# Patient Record
Sex: MALE | Race: WHITE | NOT HISPANIC OR LATINO | Employment: OTHER | URBAN - METROPOLITAN AREA
[De-identification: names, ages, dates, MRNs, and addresses within clinical notes are randomized per-mention and may not be internally consistent; named-entity substitution may affect disease eponyms.]

---

## 2017-10-21 ENCOUNTER — HOSPITAL ENCOUNTER (OUTPATIENT)
Facility: MEDICAL CENTER | Age: 57
End: 2017-10-21
Payer: COMMERCIAL

## 2017-10-21 LAB
ALBUMIN SERPL BCP-MCNC: 4 G/DL (ref 3.2–4.9)
ALBUMIN/GLOB SERPL: 1.4 G/DL
ALP SERPL-CCNC: 57 U/L (ref 30–99)
ALT SERPL-CCNC: 14 U/L (ref 2–50)
ANION GAP SERPL CALC-SCNC: 7 MMOL/L (ref 0–11.9)
AST SERPL-CCNC: 19 U/L (ref 12–45)
BDY FAT % MEASURED: 16.4 %
BILIRUB SERPL-MCNC: 0.7 MG/DL (ref 0.1–1.5)
BP DIAS: 84 MMHG
BP SYS: 136 MMHG
BUN SERPL-MCNC: 13 MG/DL (ref 8–22)
CALCIUM SERPL-MCNC: 9.4 MG/DL (ref 8.5–10.5)
CHLORIDE SERPL-SCNC: 104 MMOL/L (ref 96–112)
CHOLEST SERPL-MCNC: 179 MG/DL (ref 100–199)
CO2 SERPL-SCNC: 27 MMOL/L (ref 20–33)
CREAT SERPL-MCNC: 1.06 MG/DL (ref 0.5–1.4)
DIABETES HTDIA: NO
EVENT NAME HTEVT: NORMAL
GFR SERPL CREATININE-BSD FRML MDRD: >60 ML/MIN/1.73 M 2
GLOBULIN SER CALC-MCNC: 2.8 G/DL (ref 1.9–3.5)
GLUCOSE SERPL-MCNC: 88 MG/DL (ref 65–99)
HDLC SERPL-MCNC: 80 MG/DL
HYPERTENSION HTHYP: NO
LDLC SERPL CALC-MCNC: 87 MG/DL
POTASSIUM SERPL-SCNC: 4.5 MMOL/L (ref 3.6–5.5)
PROT SERPL-MCNC: 6.8 G/DL (ref 6–8.2)
SCREENING LOC CITY HTCIT: NORMAL
SCREENING LOC STATE HTSTA: NORMAL
SCREENING LOCATION HTLOC: NORMAL
SODIUM SERPL-SCNC: 138 MMOL/L (ref 135–145)
SUBSCRIBER ID HTSID: NORMAL
TRIGL SERPL-MCNC: 60 MG/DL (ref 0–149)

## 2018-05-30 ENCOUNTER — APPOINTMENT (OUTPATIENT)
Dept: RADIOLOGY | Facility: MEDICAL CENTER | Age: 58
End: 2018-05-30
Attending: EMERGENCY MEDICINE
Payer: COMMERCIAL

## 2018-05-30 ENCOUNTER — HOSPITAL ENCOUNTER (OUTPATIENT)
Facility: MEDICAL CENTER | Age: 58
End: 2018-06-01
Attending: EMERGENCY MEDICINE | Admitting: INTERNAL MEDICINE
Payer: COMMERCIAL

## 2018-05-30 ENCOUNTER — OFFICE VISIT (OUTPATIENT)
Dept: URGENT CARE | Facility: CLINIC | Age: 58
End: 2018-05-30
Payer: COMMERCIAL

## 2018-05-30 VITALS
SYSTOLIC BLOOD PRESSURE: 140 MMHG | WEIGHT: 204 LBS | OXYGEN SATURATION: 98 % | TEMPERATURE: 97.2 F | BODY MASS INDEX: 26.18 KG/M2 | HEART RATE: 86 BPM | RESPIRATION RATE: 16 BRPM | HEIGHT: 74 IN | DIASTOLIC BLOOD PRESSURE: 110 MMHG

## 2018-05-30 DIAGNOSIS — I48.92 ATRIAL FLUTTER, UNSPECIFIED TYPE (HCC): ICD-10-CM

## 2018-05-30 DIAGNOSIS — R42 DIZZINESS: ICD-10-CM

## 2018-05-30 DIAGNOSIS — R94.31 ABNORMAL ECG: ICD-10-CM

## 2018-05-30 DIAGNOSIS — I48.92 ATRIAL FLUTTER, UNSPECIFIED TYPE (HCC): Primary | ICD-10-CM

## 2018-05-30 DIAGNOSIS — I49.9 IRREGULAR HEART RATE: ICD-10-CM

## 2018-05-30 DIAGNOSIS — R03.0 ELEVATED BLOOD PRESSURE READING: ICD-10-CM

## 2018-05-30 PROBLEM — Z78.9 ALCOHOL USE: Status: ACTIVE | Noted: 2018-05-30

## 2018-05-30 PROBLEM — F10.90 ALCOHOL USE: Status: ACTIVE | Noted: 2018-05-30

## 2018-05-30 LAB
ALBUMIN SERPL BCP-MCNC: 4.1 G/DL (ref 3.2–4.9)
ALBUMIN/GLOB SERPL: 1.5 G/DL
ALP SERPL-CCNC: 50 U/L (ref 30–99)
ALT SERPL-CCNC: 25 U/L (ref 2–50)
ANION GAP SERPL CALC-SCNC: 12 MMOL/L (ref 0–11.9)
APTT PPP: 25 SEC (ref 24.7–36)
AST SERPL-CCNC: 26 U/L (ref 12–45)
BASOPHILS # BLD AUTO: 0.3 % (ref 0–1.8)
BASOPHILS # BLD: 0.02 K/UL (ref 0–0.12)
BILIRUB SERPL-MCNC: 1.6 MG/DL (ref 0.1–1.5)
BNP SERPL-MCNC: 52 PG/ML (ref 0–100)
BUN SERPL-MCNC: 13 MG/DL (ref 8–22)
CALCIUM SERPL-MCNC: 9.9 MG/DL (ref 8.5–10.5)
CHLORIDE SERPL-SCNC: 103 MMOL/L (ref 96–112)
CO2 SERPL-SCNC: 24 MMOL/L (ref 20–33)
CREAT SERPL-MCNC: 1 MG/DL (ref 0.5–1.4)
EKG IMPRESSION: NORMAL
EOSINOPHIL # BLD AUTO: 0.07 K/UL (ref 0–0.51)
EOSINOPHIL NFR BLD: 1.2 % (ref 0–6.9)
ERYTHROCYTE [DISTWIDTH] IN BLOOD BY AUTOMATED COUNT: 41.5 FL (ref 35.9–50)
GLOBULIN SER CALC-MCNC: 2.7 G/DL (ref 1.9–3.5)
GLUCOSE SERPL-MCNC: 105 MG/DL (ref 65–99)
HCT VFR BLD AUTO: 44.9 % (ref 42–52)
HGB BLD-MCNC: 15.6 G/DL (ref 14–18)
IMM GRANULOCYTES # BLD AUTO: 0.02 K/UL (ref 0–0.11)
IMM GRANULOCYTES NFR BLD AUTO: 0.3 % (ref 0–0.9)
INR PPP: 1.08 (ref 0.87–1.13)
LYMPHOCYTES # BLD AUTO: 1.55 K/UL (ref 1–4.8)
LYMPHOCYTES NFR BLD: 25.5 % (ref 22–41)
MAGNESIUM SERPL-MCNC: 1.9 MG/DL (ref 1.5–2.5)
MCH RBC QN AUTO: 31.8 PG (ref 27–33)
MCHC RBC AUTO-ENTMCNC: 34.7 G/DL (ref 33.7–35.3)
MCV RBC AUTO: 91.4 FL (ref 81.4–97.8)
MONOCYTES # BLD AUTO: 0.41 K/UL (ref 0–0.85)
MONOCYTES NFR BLD AUTO: 6.7 % (ref 0–13.4)
NEUTROPHILS # BLD AUTO: 4.01 K/UL (ref 1.82–7.42)
NEUTROPHILS NFR BLD: 66 % (ref 44–72)
NRBC # BLD AUTO: 0 K/UL
NRBC BLD-RTO: 0 /100 WBC
PLATELET # BLD AUTO: 172 K/UL (ref 164–446)
PMV BLD AUTO: 10.3 FL (ref 9–12.9)
POTASSIUM SERPL-SCNC: 3.9 MMOL/L (ref 3.6–5.5)
PROT SERPL-MCNC: 6.8 G/DL (ref 6–8.2)
PROTHROMBIN TIME: 13.7 SEC (ref 12–14.6)
RBC # BLD AUTO: 4.91 M/UL (ref 4.7–6.1)
SODIUM SERPL-SCNC: 139 MMOL/L (ref 135–145)
TROPONIN I SERPL-MCNC: 0.01 NG/ML (ref 0–0.04)
TROPONIN I SERPL-MCNC: 0.01 NG/ML (ref 0–0.04)
TSH SERPL DL<=0.005 MIU/L-ACNC: 1.59 UIU/ML (ref 0.38–5.33)
VIT B12 SERPL-MCNC: 392 PG/ML (ref 211–911)
WBC # BLD AUTO: 6.1 K/UL (ref 4.8–10.8)

## 2018-05-30 PROCEDURE — 85025 COMPLETE CBC W/AUTO DIFF WBC: CPT

## 2018-05-30 PROCEDURE — 36415 COLL VENOUS BLD VENIPUNCTURE: CPT

## 2018-05-30 PROCEDURE — 700102 HCHG RX REV CODE 250 W/ 637 OVERRIDE(OP): Performed by: INTERNAL MEDICINE

## 2018-05-30 PROCEDURE — 99220 PR INITIAL OBSERVATION CARE,LEVL III: CPT | Performed by: INTERNAL MEDICINE

## 2018-05-30 PROCEDURE — 93000 ELECTROCARDIOGRAM COMPLETE: CPT | Performed by: NURSE PRACTITIONER

## 2018-05-30 PROCEDURE — 85730 THROMBOPLASTIN TIME PARTIAL: CPT

## 2018-05-30 PROCEDURE — 84443 ASSAY THYROID STIM HORMONE: CPT

## 2018-05-30 PROCEDURE — 93005 ELECTROCARDIOGRAM TRACING: CPT | Performed by: EMERGENCY MEDICINE

## 2018-05-30 PROCEDURE — A9270 NON-COVERED ITEM OR SERVICE: HCPCS | Performed by: STUDENT IN AN ORGANIZED HEALTH CARE EDUCATION/TRAINING PROGRAM

## 2018-05-30 PROCEDURE — 700102 HCHG RX REV CODE 250 W/ 637 OVERRIDE(OP): Performed by: EMERGENCY MEDICINE

## 2018-05-30 PROCEDURE — 93005 ELECTROCARDIOGRAM TRACING: CPT

## 2018-05-30 PROCEDURE — 99285 EMERGENCY DEPT VISIT HI MDM: CPT

## 2018-05-30 PROCEDURE — 96365 THER/PROPH/DIAG IV INF INIT: CPT

## 2018-05-30 PROCEDURE — G0378 HOSPITAL OBSERVATION PER HR: HCPCS

## 2018-05-30 PROCEDURE — 82607 VITAMIN B-12: CPT

## 2018-05-30 PROCEDURE — 700105 HCHG RX REV CODE 258: Performed by: HOSPITALIST

## 2018-05-30 PROCEDURE — 80053 COMPREHEN METABOLIC PANEL: CPT

## 2018-05-30 PROCEDURE — 83735 ASSAY OF MAGNESIUM: CPT

## 2018-05-30 PROCEDURE — 85610 PROTHROMBIN TIME: CPT

## 2018-05-30 PROCEDURE — 83880 ASSAY OF NATRIURETIC PEPTIDE: CPT

## 2018-05-30 PROCEDURE — A9270 NON-COVERED ITEM OR SERVICE: HCPCS | Performed by: HOSPITALIST

## 2018-05-30 PROCEDURE — A9270 NON-COVERED ITEM OR SERVICE: HCPCS | Performed by: INTERNAL MEDICINE

## 2018-05-30 PROCEDURE — A9270 NON-COVERED ITEM OR SERVICE: HCPCS | Performed by: EMERGENCY MEDICINE

## 2018-05-30 PROCEDURE — 71045 X-RAY EXAM CHEST 1 VIEW: CPT

## 2018-05-30 PROCEDURE — 99204 OFFICE O/P NEW MOD 45 MIN: CPT | Performed by: NURSE PRACTITIONER

## 2018-05-30 PROCEDURE — 700102 HCHG RX REV CODE 250 W/ 637 OVERRIDE(OP): Performed by: HOSPITALIST

## 2018-05-30 PROCEDURE — 700102 HCHG RX REV CODE 250 W/ 637 OVERRIDE(OP): Performed by: STUDENT IN AN ORGANIZED HEALTH CARE EDUCATION/TRAINING PROGRAM

## 2018-05-30 PROCEDURE — 700111 HCHG RX REV CODE 636 W/ 250 OVERRIDE (IP): Performed by: STUDENT IN AN ORGANIZED HEALTH CARE EDUCATION/TRAINING PROGRAM

## 2018-05-30 PROCEDURE — 84484 ASSAY OF TROPONIN QUANT: CPT | Mod: 91

## 2018-05-30 RX ORDER — BISACODYL 10 MG
10 SUPPOSITORY, RECTAL RECTAL
Status: DISCONTINUED | OUTPATIENT
Start: 2018-05-30 | End: 2018-06-01 | Stop reason: HOSPADM

## 2018-05-30 RX ORDER — SODIUM CHLORIDE 9 MG/ML
INJECTION, SOLUTION INTRAVENOUS CONTINUOUS
Status: DISCONTINUED | OUTPATIENT
Start: 2018-05-30 | End: 2018-05-31

## 2018-05-30 RX ORDER — LISINOPRIL 5 MG/1
5 TABLET ORAL
Status: DISCONTINUED | OUTPATIENT
Start: 2018-05-30 | End: 2018-06-01 | Stop reason: HOSPADM

## 2018-05-30 RX ORDER — THIAMINE MONONITRATE (VIT B1) 100 MG
100 TABLET ORAL DAILY
Status: DISCONTINUED | OUTPATIENT
Start: 2018-05-30 | End: 2018-06-01 | Stop reason: HOSPADM

## 2018-05-30 RX ORDER — MECLIZINE HYDROCHLORIDE 25 MG/1
25 TABLET ORAL 3 TIMES DAILY PRN
Status: DISCONTINUED | OUTPATIENT
Start: 2018-05-30 | End: 2018-06-01 | Stop reason: HOSPADM

## 2018-05-30 RX ORDER — POLYETHYLENE GLYCOL 3350 17 G/17G
1 POWDER, FOR SOLUTION ORAL
Status: DISCONTINUED | OUTPATIENT
Start: 2018-05-30 | End: 2018-06-01 | Stop reason: HOSPADM

## 2018-05-30 RX ORDER — MAGNESIUM SULFATE 1 G/100ML
1 INJECTION INTRAVENOUS ONCE
Status: COMPLETED | OUTPATIENT
Start: 2018-05-30 | End: 2018-05-30

## 2018-05-30 RX ORDER — MECLIZINE HYDROCHLORIDE 25 MG/1
25 TABLET ORAL ONCE
Status: COMPLETED | OUTPATIENT
Start: 2018-05-30 | End: 2018-05-30

## 2018-05-30 RX ORDER — AMOXICILLIN 250 MG
2 CAPSULE ORAL 2 TIMES DAILY
Status: DISCONTINUED | OUTPATIENT
Start: 2018-05-30 | End: 2018-06-01 | Stop reason: HOSPADM

## 2018-05-30 RX ORDER — ONDANSETRON 2 MG/ML
4 INJECTION INTRAMUSCULAR; INTRAVENOUS EVERY 4 HOURS PRN
Status: DISCONTINUED | OUTPATIENT
Start: 2018-05-30 | End: 2018-06-01 | Stop reason: HOSPADM

## 2018-05-30 RX ORDER — ASPIRIN 81 MG/1
324 TABLET, CHEWABLE ORAL ONCE
Status: COMPLETED | OUTPATIENT
Start: 2018-05-30 | End: 2018-05-30

## 2018-05-30 RX ORDER — ASPIRIN 300 MG/1
300 SUPPOSITORY RECTAL ONCE
Status: COMPLETED | OUTPATIENT
Start: 2018-05-30 | End: 2018-05-30

## 2018-05-30 RX ORDER — PROMETHAZINE HYDROCHLORIDE 25 MG/1
12.5-25 TABLET ORAL EVERY 4 HOURS PRN
Status: DISCONTINUED | OUTPATIENT
Start: 2018-05-30 | End: 2018-06-01 | Stop reason: HOSPADM

## 2018-05-30 RX ORDER — PROMETHAZINE HYDROCHLORIDE 25 MG/1
12.5-25 SUPPOSITORY RECTAL EVERY 4 HOURS PRN
Status: DISCONTINUED | OUTPATIENT
Start: 2018-05-30 | End: 2018-06-01 | Stop reason: HOSPADM

## 2018-05-30 RX ORDER — ONDANSETRON 4 MG/1
4 TABLET, ORALLY DISINTEGRATING ORAL EVERY 4 HOURS PRN
Status: DISCONTINUED | OUTPATIENT
Start: 2018-05-30 | End: 2018-06-01 | Stop reason: HOSPADM

## 2018-05-30 RX ORDER — ALPHA LIPOIC ACID 200 MG
1 CAPSULE ORAL DAILY
COMMUNITY
End: 2018-07-16

## 2018-05-30 RX ADMIN — THERA TABS 1 TABLET: TAB at 16:34

## 2018-05-30 RX ADMIN — MECLIZINE HYDROCHLORIDE 25 MG: 25 TABLET ORAL at 16:34

## 2018-05-30 RX ADMIN — Medication 100 MG: at 16:35

## 2018-05-30 RX ADMIN — SODIUM CHLORIDE: 9 INJECTION, SOLUTION INTRAVENOUS at 16:36

## 2018-05-30 RX ADMIN — ASPIRIN 324 MG: 81 TABLET, CHEWABLE ORAL at 12:25

## 2018-05-30 RX ADMIN — SODIUM CHLORIDE: 9 INJECTION, SOLUTION INTRAVENOUS at 20:34

## 2018-05-30 RX ADMIN — LISINOPRIL 5 MG: 5 TABLET ORAL at 20:32

## 2018-05-30 RX ADMIN — MAGNESIUM SULFATE IN DEXTROSE 1 G: 10 INJECTION, SOLUTION INTRAVENOUS at 16:34

## 2018-05-30 RX ADMIN — APIXABAN 5 MG: 5 TABLET, FILM COATED ORAL at 18:33

## 2018-05-30 ASSESSMENT — PAIN SCALES - GENERAL: PAINLEVEL_OUTOF10: 0

## 2018-05-30 ASSESSMENT — COPD QUESTIONNAIRES
DURING THE PAST 4 WEEKS HOW MUCH DID YOU FEEL SHORT OF BREATH: NONE/LITTLE OF THE TIME
IN THE PAST 12 MONTHS DO YOU DO LESS THAN YOU USED TO BECAUSE OF YOUR BREATHING PROBLEMS: DISAGREE/UNSURE
COPD SCREENING SCORE: 1
HAVE YOU SMOKED AT LEAST 100 CIGARETTES IN YOUR ENTIRE LIFE: NO/DON'T KNOW
COPD SCREENING SCORE: 1
DURING THE PAST 4 WEEKS HOW MUCH DID YOU FEEL SHORT OF BREATH: NONE/LITTLE OF THE TIME
HAVE YOU SMOKED AT LEAST 100 CIGARETTES IN YOUR ENTIRE LIFE: NO/DON'T KNOW
DO YOU EVER COUGH UP ANY MUCUS OR PHLEGM?: NO/ONLY WITH OCCASIONAL COLDS OR INFECTIONS
DO YOU EVER COUGH UP ANY MUCUS OR PHLEGM?: NO/ONLY WITH OCCASIONAL COLDS OR INFECTIONS

## 2018-05-30 ASSESSMENT — ENCOUNTER SYMPTOMS
NECK PAIN: 0
TINGLING: 0
CHILLS: 0
BACK PAIN: 0
DIAPHORESIS: 0
NERVOUS/ANXIOUS: 0
SPEECH CHANGE: 0
CONSTIPATION: 0
BLOOD IN STOOL: 0
ABDOMINAL PAIN: 0
DEPRESSION: 0
DIZZINESS: 1
NAUSEA: 1
DIARRHEA: 0
FEVER: 0
HEADACHES: 0
VOMITING: 1
COUGH: 0
FOCAL WEAKNESS: 0
SENSORY CHANGE: 0
PALPITATIONS: 1
HALLUCINATIONS: 0
SHORTNESS OF BREATH: 0
SORE THROAT: 0
SPUTUM PRODUCTION: 0
DOUBLE VISION: 0
BLURRED VISION: 0
MYALGIAS: 0

## 2018-05-30 ASSESSMENT — LIFESTYLE VARIABLES
EVER HAD A DRINK FIRST THING IN THE MORNING TO STEADY YOUR NERVES TO GET RID OF A HANGOVER: NO
TOTAL SCORE: 1
CONSUMPTION TOTAL: POSITIVE
EVER FELT BAD OR GUILTY ABOUT YOUR DRINKING: NO
ON A TYPICAL DAY WHEN YOU DRINK ALCOHOL HOW MANY DRINKS DO YOU HAVE: 3
TOTAL SCORE: 1
EVER_SMOKED: NEVER
ALCOHOL_USE: YES
TOTAL SCORE: 1
HAVE PEOPLE ANNOYED YOU BY CRITICIZING YOUR DRINKING: NO
HAVE YOU EVER FELT YOU SHOULD CUT DOWN ON YOUR DRINKING: YES
AVERAGE NUMBER OF DAYS PER WEEK YOU HAVE A DRINK CONTAINING ALCOHOL: 6
EVER_SMOKED: NEVER
HOW MANY TIMES IN THE PAST YEAR HAVE YOU HAD 5 OR MORE DRINKS IN A DAY: 100

## 2018-05-30 ASSESSMENT — PATIENT HEALTH QUESTIONNAIRE - PHQ9
SUM OF ALL RESPONSES TO PHQ9 QUESTIONS 1 AND 2: 0
1. LITTLE INTEREST OR PLEASURE IN DOING THINGS: NOT AT ALL
2. FEELING DOWN, DEPRESSED, IRRITABLE, OR HOPELESS: NOT AT ALL

## 2018-05-30 NOTE — NON-PROVIDER
"      Internal Medicine Medical Student Admitting History and Physical  Note Author: Caesar Marquez, Student    Name Edgar Jackson     1960   Age/Sex 57 y.o. male   MRN [id number]   Code Status full     After 5PM or if no immediate response to page, please call for cross-coverage  Attending/Team: Dr. Marcus/Lang See Patient List for primary contact information  Call [phone] to page after hours   1st Call - Day Intern (R1):   Dr. Mccabe 2nd Call - Day Sr. Resident (R2/R3):   Dr. Dumont       Chief Complaint:  dizziness, nausea, atrial flutter    HPI: Patient is a 56 yo male with a history of alcohol use who presents with 3 day history of positional dizziness and nausea. Symptoms began Monday while he was working in the yard. He started to feel like everything was spinning. He stayed home from work Tuesday hoping symptoms would resolve and then went to urgent care Wednesday when they did not. Urgent care recommended he come to the ED. Symptoms worsen with head movements and are associated with nausea and vomiting x1. Symptoms improve when patient does not move his head.    Patient has been experiencing \"heart racing\" for the past 6 months. Symptoms have no apparent pattern to when they begin and what will relieve them. He has never experienced anything like this before. He wears a heart monitor while working out and has noticed his heart rate can reach up to 220 at times where it previously remained below 160. On presentation to the ED, patient was noted to be in atrial flutter on EKG.    ED: EKG, CXR, CBC, CMP, troponin 0.01, BNP 52, B12 392, TSH 1.59    Review of Systems   Constitutional: Negative for chills and fever.   HENT: Negative for hearing loss and tinnitus.    Eyes: Negative for blurred vision and double vision.   Respiratory: Negative for shortness of breath.    Cardiovascular: Positive for palpitations. Negative for chest pain.   Gastrointestinal: Positive for nausea and vomiting. Negative " "for abdominal pain, blood in stool and melena.   Genitourinary: Negative for dysuria.   Skin: Negative for rash.   Neurological: Positive for dizziness. Negative for tingling, sensory change, focal weakness and headaches.             Past Medical History  And current medications (link outpatient medications  with diagnosis)    none    Past Surgical History:  No past surgical history on file.      Medication Allergy/Sensitivities:  Allergies   Allergen Reactions   • Nkda [No Known Drug Allergy]          Family History:  Dad - HTN, coronary stent  Grandparents - pacemaker for \"slow heart rate\"    Social History:  Smoking: denied  Alcohol: 4 drinks per day (beer, dakota, vodka)  Illictis: denied  Other: works at [employer]          Physical Exam     Vitals:    05/30/18 1400 05/30/18 1413 05/30/18 1500 05/30/18 1600   BP:       Pulse: 82 81 83 62   Resp: 16 12 (!) 21 (!) 11   Temp:       SpO2: 98% 98% 99% 95%   Weight:       Height:         Body mass index is 26.32 kg/m².  BP (!) 169/113   Pulse 62   Temp 36.9 °C (98.5 °F)   Resp (!) 11   Ht 1.88 m (6' 2\")   Wt 93 kg (205 lb)   SpO2 95%   BMI 26.32 kg/m²   O2 therapy: Pulse Oximetry: 95 %, O2 (LPM): 0, FiO2%: 21 %    Physical Exam   Constitutional: He is oriented to person, place, and time and well-developed, well-nourished, and in no distress.   HENT:   Head: Normocephalic and atraumatic.   Eyes: EOM are normal. Pupils are equal, round, and reactive to light. Right eye exhibits no nystagmus. Left eye exhibits no nystagmus.   Cardiovascular: Normal rate.  An irregularly irregular rhythm present. Exam reveals no gallop.    No murmur heard.  Atrial flutter   Pulmonary/Chest: Breath sounds normal. He has no wheezes. He has no rales.   Abdominal: Soft. He exhibits no distension.   Musculoskeletal: He exhibits no edema.   Neurological: He is alert and oriented to person, place, and time.   Skin: Skin is warm and dry. No rash noted. He is not " diaphoretic.           Assessment/Plan     #dizziness  -Likely benign paroxysmal positional vertigo. Head movements exacerbate symptoms and occur while lying down. Dizziness unlikely related to atrial flutter as the flutter appears to be a chronic issue and dizziness has had more acute onset. Patient does not present with symptoms of presyncope or disequilibrium.    Plan:  -one dose meclizine 25mg to see if it improves symptoms    #atrial flutter  -Likely chronic issue. Patient has been experiencing palpitations for 6 months as well as tachycardia to 220s during exercise. Alcohol is most likely the cause of his atrial flutter given that patient drinks 4 alcoholic beverages per day. Cardiology is following and is also considering thyroid vs structural abnormality vs pulmonary. CHADS-VASc score 0.    Plan:  -echo to assess heart structure and function  -thyroid panel to assess for thyroid abnormality  -alcohol cessation counseling, last drink Monday evening  -NPO at midnight for VIBHA flutter ablation tomorrow    #alcohol use  -Patient reports drinking 4 alcoholic beverages per day. Has never experienced withdrawal. Last drink was Monday evening.    Plan:  -alcohol cessation counseling as above

## 2018-05-30 NOTE — ED TRIAGE NOTES
Pt BIB EMS from   Chief Complaint   Patient presents with   • Atrial Flutter     New onset, started feeling bad over the weekend and checked into UC with nausea this morning   Pt denies any CP but admits to dizziness on standing.  Chart up for ERP

## 2018-05-30 NOTE — PROGRESS NOTES
"Subjective:      Edgar Jackson is a 57 y.o. male who presents with Dizziness (nausea, x3days and has increased, vomiting due to dizziness, was cleaning yard and soon after felt, heart will start to race)      Denies past medical, surgical or family history that is significant to today's problem.   RX or OTC medications reviewed with patient today.   Allergies   Allergen Reactions   • Nkda [No Known Drug Allergy]              HPI 58 y/o male with c/o dizziness, nausea x 3 days. He feels like the room is spinning around him. Vomited several times over the past few days. Noticed that his heart was racing when he was working in his yard over the weekend. Denies numbness, tingling, abd pain, tinnitus, chest pain, cardiac palpitations, SOB, blurred vision or headache. Treatments tried: keeping well hydrated, increased rest. No other aggravating or alleviating factors.    His family member drove him to  today - he did not feel he was safe to drive. Dizziness increases with movement.       ROS  See hPI      Objective:     /110   Pulse 86   Temp 36.2 °C (97.2 °F)   Resp 16   Ht 1.88 m (6' 2\")   Wt 92.5 kg (204 lb)   SpO2 98%   BMI 26.19 kg/m²      Physical Exam   Constitutional: He is oriented to person, place, and time. Vital signs are normal. He appears well-developed and well-nourished. He is cooperative.   HENT:   Head: Normocephalic.   Eyes: Pupils are equal, round, and reactive to light.   Neck: Trachea normal, normal range of motion and full passive range of motion without pain. Neck supple.   Cardiovascular: Normal rate, normal heart sounds and normal pulses.  An irregular rhythm present. PMI is not displaced.    No murmur heard.  Pulmonary/Chest: Effort normal and breath sounds normal.   Neurological: He is alert and oriented to person, place, and time.   Skin: Skin is warm. Capillary refill takes less than 2 seconds.   Psychiatric: He has a normal mood and affect. His speech is normal and " behavior is normal. Thought content normal. Cognition and memory are normal.   Appears a little apprehensive   Nursing note and vitals reviewed.         EKG Interpretation    Interpreted by me    Rhythm: atrial flutter  Rate: 100-110  Axis: normal  Ectopy: none  Conduction: normal  ST Segments: nonspecific changes  T Waves: non specific changes  Q Waves: none    Clinical Impression: atrial flutter (new onset)         Assessment/Plan:     1. Atrial flutter, unspecified type (HCC)      4:1 block     2. Irregular heart rate  EKG - Clinic Performed   3. Dizziness  EKG - Clinic Performed   4. Abnormal ECG       [facility] called to arrange transfer to higher level of care. Report given to ER MD. Pt is to be transported via [facility]    I have reiterated to patient that although a provider to provider transfer was made this will not necessarily expedite the ER process

## 2018-05-30 NOTE — H&P
"      Internal Medicine Admitting History and Physical    Note Author: Steve Mccabe M.D.       Name Edgar Jackson 1960   Age/Sex 57 y.o. male   MRN [id number]   Code Status Full code     After 5PM or if no immediate response to page, please call for cross-coverage  Attending/Team: Dr. Marcus/ Lang See Patient List for primary contact information  Call [phone] to page    1st Call - Day Intern (R1):   Dr. Mccabe 2nd Call - Day Sr. Resident (R2/R3):   Dr. Dumont       Chief Complaint:  Dizziness, nausea  Racing heart    HPI:  Patient is a 57 year old male with no significant past medical history who presents with complaints of dizziness and nausea since Monday. Patient reported that he was getting out of his car on Monday when he first developed the dizziness. He was able to go on with his day without any trouble. However on Tuesday, it was persistent and he called in sick to work. The symptoms have been progressively worsening since then which made him go to urgent care this morning where he was found to be in atrial flutter and sent to the ED for further evaluation.  Patient describes the dizziness as a sensation of room spinning. Moving his head brings on the dizziness. It is associated with nausea and vomiting. No tinnitus/ sensation of ear fullness/ changes in hearing/ recent URI. No lightheadedness or episodes of syncope. Patient reports that for the last 6 months, he has been experiencing racing of his heart. They usually occur on exertion and resolve when he \"slows down\". Most recently , he experienced the palpitations when he was doing yard work last Saturday/. Denies chest pain, shortness of breath, lightheadedness/ passing out during the episodes. He admitted to drinking about 4-5 drinks everyday. His last drink was on Monday night. No history of withdrawal    On arrival to the ED here, he had a heart rate of 65, /113, RR 18 and saO2 99% on room air. EKG showed  atrial flutter " with atrial rate of 263, ventricular rate was 69. TSH and electrolytes were normal.  Cardiology was consulted who will consult EP service for possible ablation.       Review of Systems   Constitutional: Negative for chills, diaphoresis and fever.   HENT: Negative for congestion, ear discharge, ear pain, hearing loss, sore throat and tinnitus.    Eyes: Negative for blurred vision and double vision.   Respiratory: Negative for cough, sputum production and shortness of breath.    Cardiovascular: Positive for palpitations. Negative for chest pain and leg swelling.   Gastrointestinal: Positive for nausea and vomiting. Negative for abdominal pain, constipation and diarrhea.   Genitourinary: Negative for dysuria, frequency and urgency.   Musculoskeletal: Negative for back pain, myalgias and neck pain.   Skin: Negative for rash.   Neurological: Positive for dizziness. Negative for sensory change, speech change, focal weakness and headaches.   Psychiatric/Behavioral: Negative for depression and hallucinations. The patient is not nervous/anxious.              Past Medical History:   No past medical history per patient    Past Surgical History:  None      Current Outpatient Medications:  Home Medications     Reviewed by Tab Covarrubias (Pharmacy Tech) on 05/30/18 at 1541  Med List Status: Complete   Medication Last Dose Status   B Complex Vitamins (B COMPLEX-B12) Tab >2 days Active   Coenzyme Q10 (CO Q 10 PO) >2 days Active                Medication Allergy/Sensitivities:  Allergies   Allergen Reactions   • Nkda [No Known Drug Allergy]          Family History:  Family History   Problem Relation Age of Onset   • Heart Disease Father        Social History:  Social History     Social History   • Marital status:      Spouse name: N/A   • Number of children: N/A   • Years of education: N/A     Occupational History   • Not on file.     Social History Main Topics   • Smoking status: Never Smoker   • Smokeless tobacco:  "Never Used   • Alcohol use Yes      Comment: 4-5 drinks everyday   • Drug use: No   • Sexual activity: Yes     Partners: Female     Other Topics Concern   • Not on file     Social History Narrative   • No narrative on file     Living situation: Lives in [place]  PCP : Thomas Shepherd D.O.        Physical Exam     Vitals:    05/30/18 1330 05/30/18 1346 05/30/18 1400 05/30/18 1413   BP:       Pulse: 69 69 82 81   Resp: (!) 9 14 16 12   Temp:       SpO2: 97% 98% 98% 98%   Weight:       Height:         Body mass index is 26.32 kg/m².  BP (!) 169/113   Pulse 81   Temp 36.9 °C (98.5 °F)   Resp 12   Ht 1.88 m (6' 2\")   Wt 93 kg (205 lb)   SpO2 98%   BMI 26.32 kg/m²   O2 therapy: Pulse Oximetry: 98 %, O2 (LPM): 0, FiO2%: 21 %    Physical Exam   Constitutional: He is oriented to person, place, and time and well-developed, well-nourished, and in no distress.   HENT:   Head: Normocephalic and atraumatic.   Mouth/Throat: No oropharyngeal exudate.   Head movement brings on the dizziness   Eyes: EOM are normal. Pupils are equal, round, and reactive to light.   No nystagmus   Neck: Normal range of motion. Neck supple. No tracheal deviation present.   Cardiovascular: Normal rate and normal heart sounds.    No murmur heard.  Irregular rhythm   Pulmonary/Chest: Effort normal and breath sounds normal. No respiratory distress. He has no wheezes.   Abdominal: Soft. Bowel sounds are normal. He exhibits no distension. There is no tenderness.   Musculoskeletal: He exhibits no edema.   Neurological: He is alert and oriented to person, place, and time. No cranial nerve deficit. Coordination normal.   Skin: No rash noted. No erythema.   Psychiatric: Memory, affect and judgment normal.             Data Review       Old Records Request:   Completed  Current Records review/summary: Completed    Lab Data Review:  Recent Results (from the past 24 hour(s))   EKG (NOW)    Collection Time: 05/30/18 11:51 AM   Result Value Ref Range    Report  "      [facility]    Test Date:  2018  Pt Name:    EDWIN QUICK            Department: ER  MRN:        [id number]                      Room:       RD 01  Gender:     Male                         Technician: [id number]  :        1960                   Requested By:ER TRIAGE PROTOCOL  Order #:    [id number]                    Reading MD:    Measurements  Intervals                                Axis  Rate:       69                           P:  FL:                                      QRS:        35  QRSD:       114                          T:          -18  QT:         464  QTc:        497    Interpretive Statements  ATRIAL FLUTTER, A-RATE 263  INCOMPLETE RIGHT BUNDLE BRANCH BLOCK  ARTIFACT IN LEAD(S) III,aVL,aVF  No previous ECG available for comparison     CBC w/ Differential    Collection Time: 18 11:56 AM   Result Value Ref Range    WBC 6.1 4.8 - 10.8 K/uL    RBC 4.91 4.70 - 6.10 M/uL    Hemoglobin 15.6 14.0 - 18.0 g/dL    Hematocrit 44.9 42.0 - 52.0 %    MCV 91.4 81.4 - 97.8 fL    MCH 31.8 27.0 - 33.0 pg    MCHC 34.7 33.7 - 35.3 g/dL    RDW 41.5 35.9 - 50.0 fL    Platelet Count 172 164 - 446 K/uL    MPV 10.3 9.0 - 12.9 fL    Neutrophils-Polys 66.00 44.00 - 72.00 %    Lymphocytes 25.50 22.00 - 41.00 %    Monocytes 6.70 0.00 - 13.40 %    Eosinophils 1.20 0.00 - 6.90 %    Basophils 0.30 0.00 - 1.80 %    Immature Granulocytes 0.30 0.00 - 0.90 %    Nucleated RBC 0.00 /100 WBC    Neutrophils (Absolute) 4.01 1.82 - 7.42 K/uL    Lymphs (Absolute) 1.55 1.00 - 4.80 K/uL    Monos (Absolute) 0.41 0.00 - 0.85 K/uL    Eos (Absolute) 0.07 0.00 - 0.51 K/uL    Baso (Absolute) 0.02 0.00 - 0.12 K/uL    Immature Granulocytes (abs) 0.02 0.00 - 0.11 K/uL    NRBC (Absolute) 0.00 K/uL   Complete Metabolic Panel (CMP)    Collection Time: 18 11:56 AM   Result Value Ref Range    Sodium 139 135 - 145 mmol/L    Potassium 3.9 3.6 - 5.5 mmol/L    Chloride 103 96 - 112 mmol/L    Co2 24 20  - 33 mmol/L    Anion Gap 12.0 (H) 0.0 - 11.9    Glucose 105 (H) 65 - 99 mg/dL    Bun 13 8 - 22 mg/dL    Creatinine 1.00 0.50 - 1.40 mg/dL    Calcium 9.9 8.5 - 10.5 mg/dL    AST(SGOT) 26 12 - 45 U/L    ALT(SGPT) 25 2 - 50 U/L    Alkaline Phosphatase 50 30 - 99 U/L    Total Bilirubin 1.6 (H) 0.1 - 1.5 mg/dL    Albumin 4.1 3.2 - 4.9 g/dL    Total Protein 6.8 6.0 - 8.2 g/dL    Globulin 2.7 1.9 - 3.5 g/dL    A-G Ratio 1.5 g/dL   Btype Natriuretic Peptide    Collection Time: 05/30/18 11:56 AM   Result Value Ref Range    B Natriuretic Peptide 52 0 - 100 pg/mL   Troponin STAT    Collection Time: 05/30/18 11:56 AM   Result Value Ref Range    Troponin I 0.01 0.00 - 0.04 ng/mL   APTT    Collection Time: 05/30/18 11:56 AM   Result Value Ref Range    APTT 25.0 24.7 - 36.0 sec   PT/INR    Collection Time: 05/30/18 11:56 AM   Result Value Ref Range    PT 13.7 12.0 - 14.6 sec    INR 1.08 0.87 - 1.13   TSH (for screening thyroid dysfunction)    Collection Time: 05/30/18 11:56 AM   Result Value Ref Range    TSH 1.590 0.380 - 5.330 uIU/mL   ESTIMATED GFR    Collection Time: 05/30/18 11:56 AM   Result Value Ref Range    GFR If African American >60 >60 mL/min/1.73 m 2    GFR If Non African American >60 >60 mL/min/1.73 m 2       Imaging/Procedures Review:    Independant Imaging Review: Completed  DX-CHEST-PORTABLE (1 VIEW)   Final Result      No consolidation.      ECHOCARDIOGRAM COMP W/O CONT    (Results Pending)          EKG:   EKG Independant Review: Completed  QTc:497, HR: 69, Atrial flutter with variable block    Records reviewed and summarized in current documentation :  Yes  [employer] teaching service handout given to patient:  No             Assessment/Plan     * Atrial flutter (HCC)   Assessment & Plan    Likely chronic given history of palpitations since 6 months. Most likely exacerbated by alcohol use.  - CHADsVasc 0 -1 ( denies being diagnosed with hypertension)  - Normal TSH , potassium 3.9, magnesium 1.9  - Seen by cardiology and  EP service  - NPO at midnight for VIBHA and flutter ablation tomorrow  - Echo  - Started on eliquis for anticoagulation        Vertigo   Assessment & Plan    - ? BPPV given positional nature. No recent URI to suggest vestibular neuronitis, although still possible  - Meclizine PRN  - Zofran for nausea  - If persistent, will need education on repositioning maneuvers        HTN (hypertension)- (present on admission)   Assessment & Plan    - Patient denies being diagnosed with hypertension, not on any medications  - BP consistently in 140s-160s/100s-110s range  - Lisinopril 5 mg started        Alcohol use   Assessment & Plan    - Has 4-5 drinks everyday  - Last drink on Monday night. Denies previous alcohol withdrawal  - Does not appear to be withdrawing on exam at this time  - Consider CIWA protocol if patient starts to actively withdraw            Anticipated Hospital stay: Observation admit        Quality Measures  Quality-Core Measures   Reviewed items::  EKG reviewed, Labs reviewed, Medications reviewed and Radiology images reviewed  Avelar catheter::  No Avelar  DVT: Apixaban.  Ulcer Prophylaxis::  Not indicated

## 2018-05-30 NOTE — SENIOR ADMIT NOTE
SENIOR ADMIT NOTE    Mr Jackson is a 57 year old male presents to ER c/o 3 day hx of dizziness, vertigo, feeling off balance, nausea, episodes of emesis, symptoms worse with head turning/standing up, also notes having palpitations for last 6 months or so, but isn't sure if that is worse now. Denies any HA, hearing changes, tinnitus, visual changes, no focal motor/sensory changes, chest pain, dyspnea, leg swelling, syncope. Social history remarkable for 3-4 drinks alcohol daily.   In the ER,  Was noted to be in Aflutter with variable block (ventricular rate 60-70s)- unknown chronicity, ?alcohol related vs other? Cardiology was consulted, and recommend admission for EP consult, possible Aflutter ablation tomorrow, started on Eliquis, keep npo at midnight, check Echo.   Will also get PT eval for ?BPPV symptoms, trial of meclizine.     (see H&P for complete details)

## 2018-05-30 NOTE — ED NOTES
Med rec completed per pt at bedside  Allergies reviewed - NKDA  No ABX in last 30 days   No prescription medications

## 2018-05-30 NOTE — ED PROVIDER NOTES
"ED Provider Note    CHIEF COMPLAINT  Chief Complaint   Patient presents with   • Atrial Flutter     New onset, started feeling bad over the weekend and checked into UC with nausea this morning       HPI  Edgar Jackson is a 57 y.o. male who presents with dizziness. The patient states that Monday he went to get out of his car when he felt dizzy and off-balance. He states that shortly thereafter resolved. Today's also a couple spells of dizziness. He states at times he does feels heart racing but is not have any chest pain or difficulty breathing. He has no prior medical problems. He did drink a little more heavily this weekend than usual. He does usually have 4-5 drinks a night. He does not have any pain or swelling to his lower extremities. He has not any recent vomiting or diarrhea. The patient went to urgent care today and was found to have an irregular heart rhythm and is transferred here for evaluation.    REVIEW OF SYSTEMS  See HPI for further details. All other systems are negative.     PAST MEDICAL HISTORY  No past medical history on file.    SOCIAL HISTORY  Social History     Social History   • Marital status:      Spouse name: N/A   • Number of children: N/A   • Years of education: N/A     Social History Main Topics   • Smoking status: Never Smoker   • Smokeless tobacco: Not on file   • Alcohol use Yes      Comment: 3 beers nightly...   • Drug use: No   • Sexual activity: Yes     Partners: Female     Other Topics Concern   • Not on file     Social History Narrative   • No narrative on file           PHYSICAL EXAM  VITAL SIGNS: BP (!) 169/113   Pulse 78   Temp 36.9 °C (98.5 °F)   Resp 16   Ht 1.88 m (6' 2\")   Wt 93 kg (205 lb)   SpO2 99%   BMI 26.32 kg/m²   Constitutional: in acute distress, Non-toxic appearance.   HENT: Normocephalic, Atraumatic, tympanic membranes are intact and nonerythematous bilaterally, Oropharynx moist without exudates or erythema, Nose normal.   Eyes: PERRLA, " EOMI, Conjunctiva normal.  Neck: Supple without meningismus  Lymphatic: No lymphadenopathy noted.   Cardiovascular: Normal heart rate, irregular rhythm, No murmurs, No rubs, No gallops.   Thorax & Lungs: Normal breath sounds, No respiratory distress, No wheezing, No chest tenderness.   Abdomen: Bowel sounds normal, Soft, No tenderness, no rebound, no guarding, no distention, No masses, No pulsatile masses.   Skin: Warm, Dry, No erythema, No rash.   Back: No tenderness, No CVA tenderness.   Extremities: Atraumatic with symmetric distal pulses, No edema, No tenderness, No cyanosis, No clubbing.   Neurologic: Alert & oriented x 3, cranial nerves II through XII are intact, Normal motor function, Normal sensory function, No focal deficits noted.   Psychiatric: Affect normal, Judgment normal, Mood normal.     EKG  12-lead EKG interpreted by myself shows atrial flutter with a ventricular rate of 69 that is irregular, otherwise normal QRS, no ST segment elevation or depression, T waves inverted laterally in V5 and V6.    RADIOLOGY/PROCEDURES  DX-CHEST-PORTABLE (1 VIEW)   Final Result      No consolidation.      ECHOCARDIOGRAM COMP W/O CONT    (Results Pending)         COURSE & MEDICAL DECISION MAKING  Pertinent Labs & Imaging studies reviewed. (See chart for details)  This a 57-year-old male who presents to emergency department with dizziness. I suspect this is my cardiac source. His EKG shows atrial flutter with a variable ventricular rate. Therefore suspect this is drop in his blood pressure and causing his symptoms. I did contact cardiology and they want the patient admitted to telemetry and they will consult the electrophysiologist for possible ablation. Hemodynamically the patient has tolerated the atrial flutter while here in the emergency department. The blood work does not show any evidence of a metabolic derangement that could cause cardiac irritability. This could be from his recent heavy alcohol utilization over  the weekend. The patient thyroid appears to be normal. The chest x-ray does not show any evidence of pulmonary source that could cause cardiac irritability. The patient is resting comfortably. He'll be admitted to the internal medicine resident team with cardiology in consultation.    FINAL IMPRESSION  1. Atrial flutter     Disposition  The patient will be admitted in stable condition    Electronically signed by: Dino Shirley, 5/30/2018 12:12 PM

## 2018-05-30 NOTE — CONSULTS
EP Consult Note    DOS: 5/30/2018    Consulting physician: Sg Walden    Chief complaint/Reason for consult: Atrial flutter    HPI:  Patient is a 56 yo M with history of alcohol dependency (says he has 4-5 drinks a night, beer/liquor, no history of w/ drawal), and remote neck surgery, who presents with dizziness. He says symptoms started on Monday. He was out working in the yard. He said it happened when he was bending over and turning his head. Feels like he is unstable and about to fall. Associated with nausea. He vomited once. Comes on again with head movement. Symptoms did not get better he went to urgent care. He was found to have an irregular rhythm. In ED here 12 lead shows typical atrial flutter with variable block. On further questioning, he works out with weights and interval training and for the last 6 months, his HR has been skyrocketing with work outs. Minimal discomfort with this though. EP asked to consider flutter ablation for him.    ROS (+ highlighted in red):  Constitutional: Fevers/chills/fatigue/weightloss  HEENT: Blurry vision/eye pain/sore throat/hearing loss  Respiratory: Shortness of breath/cough  Cardiovascular: Chest pain/palpitations/edema/orthopnea/syncope  GI: Nausea/vomitting/diarrhea  MSK: Arthralgias/myagias/muscle weakness  Skin: Rash/sores  Neurological: Numbness/tremors/vertigo  Endocrine: Excessive thirst/polyuria/cold intolerance/heat intolerance  Psych: Depression/anxiety    PMhx: None    PSHx: Neck surgery (cervical fusion > 10 years ago)    Social History     Social History   • Marital status:      Spouse name: N/A   • Number of children: N/A   • Years of education: N/A     Occupational History   • Not on file.     Social History Main Topics   • Smoking status: Never Smoker   • Smokeless tobacco: Not on file   • Alcohol use Yes      Comment: 3 beers nightly...   • Drug use: No   • Sexual activity: Yes     Partners: Female     Other Topics Concern   • Not on file      Social History Narrative   • No narrative on file       FHx: Family history reviewed, no relevant cardiac hx in his family    Allergies   Allergen Reactions   • Nkda [No Known Drug Allergy]        Current Facility-Administered Medications   Medication Dose Route Frequency Provider Last Rate Last Dose   • senna-docusate (PERICOLACE or SENOKOT S) 8.6-50 MG per tablet 2 Tab  2 Tab Oral BID Juan Dumont M.D.        And   • polyethylene glycol/lytes (MIRALAX) PACKET 1 Packet  1 Packet Oral QDAY PRN Juan Dumont M.D.        And   • magnesium hydroxide (MILK OF MAGNESIA) suspension 30 mL  30 mL Oral QDAY PRN Juan Dumont M.D.        And   • bisacodyl (DULCOLAX) suppository 10 mg  10 mg Rectal QDAY PRN Juan Dumont M.D.       • Respiratory Care per Protocol   Nebulization Continuous RT Juan Dumont M.D.       • ondansetron (ZOFRAN) syringe/vial injection 4 mg  4 mg Intravenous Q4HRS PRN Juan Dumont M.D.       • ondansetron (ZOFRAN ODT) dispertab 4 mg  4 mg Oral Q4HRS PRN Juan Dumont M.D.       • promethazine (PHENERGAN) tablet 12.5-25 mg  12.5-25 mg Oral Q4HRS PRN Juan Dumont M.D.       • promethazine (PHENERGAN) suppository 12.5-25 mg  12.5-25 mg Rectal Q4HRS PRN Juan Dumont M.D.       • prochlorperazine (COMPAZINE) injection 5-10 mg  5-10 mg Intravenous Q4HRS PRN Juan Dumont M.D.       • thiamine tablet 100 mg  100 mg Oral DAILY Juan Dumont M.D.       • multivitamin (THERAGRAN) tablet 1 Tab  1 Tab Oral DAILY Juan Dumont M.D.       • NS infusion   Intravenous Continuous Juan Dumont M.D.       • meclizine (ANTIVERT) tablet 25 mg  25 mg Oral Once Juan Dumont M.D.       • Magnesium Sulfate in D5W IVPB premix 1 g  1 g Intravenous Once Abhinaya T Mccabe, M.D.         Current Outpatient Prescriptions   Medication Sig Dispense Refill   • Coenzyme Q10 (CO Q 10 PO) Take 1 Tab by mouth every day.     • B Complex Vitamins (B COMPLEX-B12) Tab Take 1 Tab by mouth every day.          Physical Exam:  Vitals:    05/30/18 1346 05/30/18 1400 05/30/18 1413 05/30/18 1500   BP:       Pulse: 69 82 81 83   Resp: 14 16 12 (!) 21   Temp:       SpO2: 98% 98% 98% 99%   Weight:       Height:         General appearance: NAD, conversant   Eyes: anicteric sclerae, moist conjunctivae; no lid-lag; PERRLA  HENT: Atraumatic; oropharynx clear with moist mucous membranes and no mucosal ulcerations; normal hard and soft palate  Neck: Trachea midline; FROM, supple, no thyromegaly or lymphadenopathy  Lungs: CTA, with normal respiratory effort and no intercostal retractions  CV: Irregularly irregular, no MRGs, no JVD   Abdomen: Soft, non-tender; no masses or HSM  Extremities: No peripheral edema or extremity lymphadenopathy  Skin: Normal temperature, turgor and texture; no rash, ulcers or subcutaneous nodules  Psych: Appropriate affect, alert and oriented to person, place and time    Data:  Labs reviewed    Prior echo/stress results reviewed:  Remote echo 8 years ago showing normal LV systolic function    CXR interpreted by me:  Borderline cardiomegaly    EKG interpreted by me:   Typical atrial flutter'    Impression/Plan:  1)Typical atrial flutter  2)Alcohol dependency  3)Vertigo    -His symptoms are consistent with positional vertigo, I warned him that addressing his arrhythmia may not affect his dizziness  -That being said, it sounds like he has been symptomatic from flutter for about 6 months, and paroxysmally before that  -Likely alcohol is a big contributor and I warned him that even after flutter ablation there is a good likelihood we are left with pAF  -Details of procedure, risks/benefits, were discussed, and all his questions were answered  -Keep NPO AM, we will plan for VIBHA flutter ablation tomorrow  -Start OAC tonight with Alexandra Benedict MD

## 2018-05-30 NOTE — CONSULTS
Cardiology Consult Note:                                       Note Author:   Jose Trujillo  Date & Time note created:    5/30/2018   3:00 PM     Referring MD:  Dr. Shirley    Patient ID:  Name:             Edgar Jackson   YOB: 1960  Age:                 57 y.o.  male   MRN:               [id number]                                                             Reason for Consult:      Atrial flutter    History of Present Illness:    Edgar Jackson is a 57 y.o. male with a past medical history of dyslipidemia, not on any medications(only supplements) who presented to urgent care with dizziness and nausea this am, was noted to be atrial flutter and sent to the ED.     He notes that his symptoms have been present on and off for the last 3-4 years, however he started noticing it more in the last 6 months and it has been very debilitating since Monday. He started having dizziness on 5/28, the next day he started having nausea and had to throw up x 3(clear, no hematemesis).      Of note, is that he states that he wears a heart monitor and had noted that he has noted his heart rate to be as high as 220-230 sometimes when he is exerting himself in the last 6 months, previously his highest heart rate would be 160s. Also, he had an ECHO done as part of his physical exam in 2010, which had noted RVH with RA dilatation. Furthermore, he states that his dad had a multitude of heart problems and was on blood thinner although he is unable to clarify further.     Social history significant for alcohol dependence(4 drinks/day, trying to cut down)     Review of Systems:      Constitutional: Denies fevers, Denies weight changes  Eyes: Denies changes in vision, no eye pain  Ears/Nose/Throat/Mouth: Denies nasal congestion or sore throat   Cardiovascular: Denies chest pain, + palpitations   Respiratory: No shortness of breath , Denies cough  Gastrointestinal/Hepatic: Denies abdominal pain, diarrhea, constipation  or GI bleeding. +nausea, vomiting,  Genitourinary: Denies dysuria or frequency  Musculoskeletal/Rheum: Denies  joint pain and swelling, no edema  Skin: Denies rash  Neurological: Denies headache, confusion, memory loss or focal weakness/parasthesias  Psychiatric: denies mood disorder   Endocrine: Kathi thyroid problems  Heme/Oncology/Lymph Nodes: Denies enlarged lymph nodes, denies brusing or known bleeding disorder  All other systems were reviewed and are negative (AMA/CMS criteria)                Past Medical History:   No past medical history on file.  There are no active hospital problems to display for this patient.      Past Surgical History:  No past surgical history on file.    Hospital Medications:  Current Facility-Administered Medications   Medication Dose   • senna-docusate (PERICOLACE or SENOKOT S) 8.6-50 MG per tablet 2 Tab  2 Tab    And   • polyethylene glycol/lytes (MIRALAX) PACKET 1 Packet  1 Packet    And   • magnesium hydroxide (MILK OF MAGNESIA) suspension 30 mL  30 mL    And   • bisacodyl (DULCOLAX) suppository 10 mg  10 mg   • Respiratory Care per Protocol     • ondansetron (ZOFRAN) syringe/vial injection 4 mg  4 mg   • ondansetron (ZOFRAN ODT) dispertab 4 mg  4 mg   • promethazine (PHENERGAN) tablet 12.5-25 mg  12.5-25 mg   • promethazine (PHENERGAN) suppository 12.5-25 mg  12.5-25 mg   • prochlorperazine (COMPAZINE) injection 5-10 mg  5-10 mg   • thiamine tablet 100 mg  100 mg   • multivitamin (THERAGRAN) tablet 1 Tab  1 Tab   • NS infusion     • meclizine (ANTIVERT) tablet 25 mg  25 mg     No current outpatient prescriptions on file.         Current Outpatient Medications:    (Not in a hospital admission)    Medication Allergy:  Allergies   Allergen Reactions   • Nkda [No Known Drug Allergy]        Family History:  No family history on file.    Social History:  Social History     Social History   • Marital status:      Spouse name: N/A   • Number of children: N/A   • Years of  "education: N/A     Occupational History   • Not on file.     Social History Main Topics   • Smoking status: Never Smoker   • Smokeless tobacco: Not on file   • Alcohol use Yes      Comment: 3 beers nightly...   • Drug use: No   • Sexual activity: Yes     Partners: Female     Other Topics Concern   • Not on file     Social History Narrative   • No narrative on file         Physical Exam:  Vitals/ General Appearance:   Weight/BMI: Body mass index is 26.32 kg/m².  Blood pressure (!) 169/113, pulse 81, temperature 36.9 °C (98.5 °F), resp. rate 12, height 1.88 m (6' 2\"), weight 93 kg (205 lb), SpO2 98 %.  Vitals:    05/30/18 1330 05/30/18 1346 05/30/18 1400 05/30/18 1413   BP:       Pulse: 69 69 82 81   Resp: (!) 9 14 16 12   Temp:       SpO2: 97% 98% 98% 98%   Weight:       Height:         Oxygen Therapy:  Pulse Oximetry: 98 %, O2 (LPM): 0, FiO2%: 21 %    Constitutional:  Well developed, Well nourished, No acute distress  HENMT:  Normocephalic, Atraumatic, Oropharynx moist mucous membranes, No oral exudates, Nose normal.  No thyromegaly.  Eyes:  EOMI, Conjunctiva normal, No discharge.  Neck:  Normal range of motion, No cervical tenderness,  no JVD.  Cardiovascular:  Normal heart rate, atrial flutter, No murmurs, No rubs, No gallops.   Extremitites with intact distal pulses, no cyanosis, or edema.  Lungs:  Normal breath sounds, breath sounds clear to auscultation bilaterally,  no crackles, no wheezing.   Abdomen: Bowel sounds normal, Soft, No tenderness, No guarding, No rebound, No masses, No hepatosplenomegaly.  Skin: Warm, Dry, No erythema, No rash, no induration.  Neurologic: Alert & oriented x 3, No focal deficits noted, cranial nerves II through X are grossly intact.  Psychiatric: Affect normal, Judgment normal, Mood normal.      Lab Data Review:  Recent Results (from the past 24 hour(s))   EKG (NOW)    Collection Time: 05/30/18 11:51 AM   Result Value Ref Range    Report       [facility] " Emergency Dept.    Test Date:  2018  Pt Name:    EDWIN QUICK            Department: ER  MRN:        [id number]                      Room:       RD 01  Gender:     Male                         Technician: [id number]  :        1960                   Requested By:ER TRIAGE PROTOCOL  Order #:    [id number]                    Reading MD:    Measurements  Intervals                                Axis  Rate:       69                           P:  ME:                                      QRS:        35  QRSD:       114                          T:          -18  QT:         464  QTc:        497    Interpretive Statements  ATRIAL FLUTTER, A-RATE 263  INCOMPLETE RIGHT BUNDLE BRANCH BLOCK  ARTIFACT IN LEAD(S) III,aVL,aVF  No previous ECG available for comparison     CBC w/ Differential    Collection Time: 18 11:56 AM   Result Value Ref Range    WBC 6.1 4.8 - 10.8 K/uL    RBC 4.91 4.70 - 6.10 M/uL    Hemoglobin 15.6 14.0 - 18.0 g/dL    Hematocrit 44.9 42.0 - 52.0 %    MCV 91.4 81.4 - 97.8 fL    MCH 31.8 27.0 - 33.0 pg    MCHC 34.7 33.7 - 35.3 g/dL    RDW 41.5 35.9 - 50.0 fL    Platelet Count 172 164 - 446 K/uL    MPV 10.3 9.0 - 12.9 fL    Neutrophils-Polys 66.00 44.00 - 72.00 %    Lymphocytes 25.50 22.00 - 41.00 %    Monocytes 6.70 0.00 - 13.40 %    Eosinophils 1.20 0.00 - 6.90 %    Basophils 0.30 0.00 - 1.80 %    Immature Granulocytes 0.30 0.00 - 0.90 %    Nucleated RBC 0.00 /100 WBC    Neutrophils (Absolute) 4.01 1.82 - 7.42 K/uL    Lymphs (Absolute) 1.55 1.00 - 4.80 K/uL    Monos (Absolute) 0.41 0.00 - 0.85 K/uL    Eos (Absolute) 0.07 0.00 - 0.51 K/uL    Baso (Absolute) 0.02 0.00 - 0.12 K/uL    Immature Granulocytes (abs) 0.02 0.00 - 0.11 K/uL    NRBC (Absolute) 0.00 K/uL   Complete Metabolic Panel (CMP)    Collection Time: 18 11:56 AM   Result Value Ref Range    Sodium 139 135 - 145 mmol/L    Potassium 3.9 3.6 - 5.5 mmol/L    Chloride 103 96 - 112 mmol/L    Co2 24 20 - 33 mmol/L    Anion Gap 12.0 (H)  0.0 - 11.9    Glucose 105 (H) 65 - 99 mg/dL    Bun 13 8 - 22 mg/dL    Creatinine 1.00 0.50 - 1.40 mg/dL    Calcium 9.9 8.5 - 10.5 mg/dL    AST(SGOT) 26 12 - 45 U/L    ALT(SGPT) 25 2 - 50 U/L    Alkaline Phosphatase 50 30 - 99 U/L    Total Bilirubin 1.6 (H) 0.1 - 1.5 mg/dL    Albumin 4.1 3.2 - 4.9 g/dL    Total Protein 6.8 6.0 - 8.2 g/dL    Globulin 2.7 1.9 - 3.5 g/dL    A-G Ratio 1.5 g/dL   Btype Natriuretic Peptide    Collection Time: 05/30/18 11:56 AM   Result Value Ref Range    B Natriuretic Peptide 52 0 - 100 pg/mL   Troponin STAT    Collection Time: 05/30/18 11:56 AM   Result Value Ref Range    Troponin I 0.01 0.00 - 0.04 ng/mL   APTT    Collection Time: 05/30/18 11:56 AM   Result Value Ref Range    APTT 25.0 24.7 - 36.0 sec   PT/INR    Collection Time: 05/30/18 11:56 AM   Result Value Ref Range    PT 13.7 12.0 - 14.6 sec    INR 1.08 0.87 - 1.13   TSH (for screening thyroid dysfunction)    Collection Time: 05/30/18 11:56 AM   Result Value Ref Range    TSH 1.590 0.380 - 5.330 uIU/mL   ESTIMATED GFR    Collection Time: 05/30/18 11:56 AM   Result Value Ref Range    GFR If African American >60 >60 mL/min/1.73 m 2    GFR If Non African American >60 >60 mL/min/1.73 m 2   MAGNESIUM    Collection Time: 05/30/18 11:56 AM   Result Value Ref Range    Magnesium 1.9 1.5 - 2.5 mg/dL       Imaging/Procedures Review:    DX-CHEST-PORTABLE (1 VIEW)   Final Result      No consolidation.      ECHOCARDIOGRAM COMP W/O CONT    (Results Pending)     Reviewed    EKG:   Atrial flutter on the EKG    ECHO:  No results found for this or any previous visit.     MDM (Assessment and Plan):     There are no active hospital problems to display for this patient.      Assessment and plan    56 yo fairly healthy male with PMHx significant only for dyslipidemia, not taking any medications, who presents with dizziness and nausea who was noted to be in atrial flutter and subsequently we were consulted.     Given the chronicity of his symptoms  especially with the high HR(his expected HR in the 220s would be around 170s with exercise), we suspect that his atrial flutter has been a chronic problem that has been excarbated by increased alcohol use. Differentials for the cause of his atrial flutter most likely is alcohol induced vs. Thyroid vs. Structural abnormality(RA could be the area of his flutter origin although most commonly in [place]), vs. Pulmonary(unlikely).    #Atrial flutter-likely chronic  #RVH with RA dilatation  #Dizziness    -Thyroid panel  -ECHO   -EP consulted for consideration for ablation.  -We will need to consider anticoagulation in the future, however will await workup to be completed before initiation(CHADVASC 0, notes did not have htn).   -Alcohol cessation counseling, would consider JAYLA    Thank you for the consult, we will continue to follow him.

## 2018-05-31 ENCOUNTER — APPOINTMENT (OUTPATIENT)
Dept: RADIOLOGY | Facility: MEDICAL CENTER | Age: 58
End: 2018-05-31
Attending: INTERNAL MEDICINE
Payer: COMMERCIAL

## 2018-05-31 LAB
ANION GAP SERPL CALC-SCNC: 7 MMOL/L (ref 0–11.9)
BUN SERPL-MCNC: 12 MG/DL (ref 8–22)
CALCIUM SERPL-MCNC: 9 MG/DL (ref 8.5–10.5)
CHLORIDE SERPL-SCNC: 107 MMOL/L (ref 96–112)
CO2 SERPL-SCNC: 26 MMOL/L (ref 20–33)
CREAT SERPL-MCNC: 1.03 MG/DL (ref 0.5–1.4)
EKG IMPRESSION: NORMAL
GLUCOSE SERPL-MCNC: 87 MG/DL (ref 65–99)
LV EJECT FRACT  99904: 60
LV EJECT FRACT MOD 2C 99903: 62.42
LV EJECT FRACT MOD 4C 99902: 62.53
LV EJECT FRACT MOD BP 99901: 64.19
MAGNESIUM SERPL-MCNC: 1.9 MG/DL (ref 1.5–2.5)
POTASSIUM SERPL-SCNC: 3.7 MMOL/L (ref 3.6–5.5)
SODIUM SERPL-SCNC: 140 MMOL/L (ref 135–145)

## 2018-05-31 PROCEDURE — C1893 INTRO/SHEATH, FIXED,NON-PEEL: HCPCS

## 2018-05-31 PROCEDURE — 83735 ASSAY OF MAGNESIUM: CPT

## 2018-05-31 PROCEDURE — 304952 HCHG R 2 PADS

## 2018-05-31 PROCEDURE — 700105 HCHG RX REV CODE 258: Performed by: HOSPITALIST

## 2018-05-31 PROCEDURE — G0378 HOSPITAL OBSERVATION PER HR: HCPCS

## 2018-05-31 PROCEDURE — 80048 BASIC METABOLIC PNL TOTAL CA: CPT

## 2018-05-31 PROCEDURE — 700101 HCHG RX REV CODE 250

## 2018-05-31 PROCEDURE — 93010 ELECTROCARDIOGRAM REPORT: CPT | Performed by: INTERNAL MEDICINE

## 2018-05-31 PROCEDURE — A9270 NON-COVERED ITEM OR SERVICE: HCPCS | Performed by: STUDENT IN AN ORGANIZED HEALTH CARE EDUCATION/TRAINING PROGRAM

## 2018-05-31 PROCEDURE — A9270 NON-COVERED ITEM OR SERVICE: HCPCS | Performed by: INTERNAL MEDICINE

## 2018-05-31 PROCEDURE — 93653 COMPRE EP EVAL TX SVT: CPT

## 2018-05-31 PROCEDURE — C1730 CATH, EP, 19 OR FEW ELECT: HCPCS

## 2018-05-31 PROCEDURE — 70551 MRI BRAIN STEM W/O DYE: CPT

## 2018-05-31 PROCEDURE — 700111 HCHG RX REV CODE 636 W/ 250 OVERRIDE (IP)

## 2018-05-31 PROCEDURE — 160002 HCHG RECOVERY MINUTES (STAT)

## 2018-05-31 PROCEDURE — 305383 HCHG CARTO3 REF PATCH

## 2018-05-31 PROCEDURE — 93621 COMP EP EVL L PAC&REC C SINS: CPT

## 2018-05-31 PROCEDURE — C1894 INTRO/SHEATH, NON-LASER: HCPCS

## 2018-05-31 PROCEDURE — C1732 CATH, EP, DIAG/ABL, 3D/VECT: HCPCS

## 2018-05-31 PROCEDURE — A9270 NON-COVERED ITEM OR SERVICE: HCPCS | Performed by: HOSPITALIST

## 2018-05-31 PROCEDURE — C1731 CATH, EP, 20 OR MORE ELEC: HCPCS

## 2018-05-31 PROCEDURE — 93306 TTE W/DOPPLER COMPLETE: CPT

## 2018-05-31 PROCEDURE — 93325 DOPPLER ECHO COLOR FLOW MAPG: CPT

## 2018-05-31 PROCEDURE — 36415 COLL VENOUS BLD VENIPUNCTURE: CPT

## 2018-05-31 PROCEDURE — 00537 ANES CARDIAC EP PROCEDURES: CPT

## 2018-05-31 PROCEDURE — 700102 HCHG RX REV CODE 250 W/ 637 OVERRIDE(OP): Performed by: HOSPITALIST

## 2018-05-31 PROCEDURE — 700111 HCHG RX REV CODE 636 W/ 250 OVERRIDE (IP): Performed by: HOSPITALIST

## 2018-05-31 PROCEDURE — 93306 TTE W/DOPPLER COMPLETE: CPT | Mod: 26 | Performed by: INTERNAL MEDICINE

## 2018-05-31 PROCEDURE — 700102 HCHG RX REV CODE 250 W/ 637 OVERRIDE(OP): Performed by: STUDENT IN AN ORGANIZED HEALTH CARE EDUCATION/TRAINING PROGRAM

## 2018-05-31 PROCEDURE — 700102 HCHG RX REV CODE 250 W/ 637 OVERRIDE(OP): Performed by: INTERNAL MEDICINE

## 2018-05-31 PROCEDURE — 93312 ECHO TRANSESOPHAGEAL: CPT

## 2018-05-31 PROCEDURE — 93613 INTRACARDIAC EPHYS 3D MAPG: CPT

## 2018-05-31 PROCEDURE — 93005 ELECTROCARDIOGRAM TRACING: CPT | Performed by: INTERNAL MEDICINE

## 2018-05-31 RX ORDER — POTASSIUM CHLORIDE 20 MEQ/1
40 TABLET, EXTENDED RELEASE ORAL ONCE
Status: COMPLETED | OUTPATIENT
Start: 2018-05-31 | End: 2018-05-31

## 2018-05-31 RX ORDER — MIDAZOLAM HYDROCHLORIDE 1 MG/ML
INJECTION INTRAMUSCULAR; INTRAVENOUS
Status: DISPENSED
Start: 2018-05-31 | End: 2018-05-31

## 2018-05-31 RX ADMIN — SODIUM CHLORIDE: 9 INJECTION, SOLUTION INTRAVENOUS at 09:33

## 2018-05-31 RX ADMIN — APIXABAN 5 MG: 5 TABLET, FILM COATED ORAL at 21:21

## 2018-05-31 RX ADMIN — HEPARIN SODIUM 2000 UNITS: 200 INJECTION, SOLUTION INTRAVENOUS at 12:07

## 2018-05-31 RX ADMIN — LISINOPRIL 5 MG: 5 TABLET ORAL at 05:15

## 2018-05-31 RX ADMIN — MECLIZINE HYDROCHLORIDE 25 MG: 25 TABLET ORAL at 21:21

## 2018-05-31 RX ADMIN — THERA TABS 1 TABLET: TAB at 05:15

## 2018-05-31 RX ADMIN — POTASSIUM CHLORIDE 40 MEQ: 1500 TABLET, EXTENDED RELEASE ORAL at 08:23

## 2018-05-31 RX ADMIN — Medication 100 MG: at 05:14

## 2018-05-31 ASSESSMENT — PAIN SCALES - GENERAL
PAINLEVEL_OUTOF10: 0

## 2018-05-31 ASSESSMENT — ENCOUNTER SYMPTOMS
MYALGIAS: 0
SORE THROAT: 0
FOCAL WEAKNESS: 0
PSYCHIATRIC NEGATIVE: 1
BLOOD IN STOOL: 0
DIARRHEA: 0
DOUBLE VISION: 0
PALPITATIONS: 1
TINGLING: 0
BLURRED VISION: 0
SENSORY CHANGE: 0
RESPIRATORY NEGATIVE: 1
CARDIOVASCULAR NEGATIVE: 1
WEAKNESS: 0
MUSCULOSKELETAL NEGATIVE: 1
ABDOMINAL PAIN: 0
CONSTITUTIONAL NEGATIVE: 1
NAUSEA: 0
COUGH: 0
SPEECH CHANGE: 0
FEVER: 0
PALPITATIONS: 0
DIZZINESS: 1
VOMITING: 0
HEADACHES: 0
CHILLS: 0
SHORTNESS OF BREATH: 0
CONSTIPATION: 0

## 2018-05-31 ASSESSMENT — COGNITIVE AND FUNCTIONAL STATUS - GENERAL
DAILY ACTIVITIY SCORE: 24
SUGGESTED CMS G CODE MODIFIER MOBILITY: CH
MOBILITY SCORE: 24
SUGGESTED CMS G CODE MODIFIER DAILY ACTIVITY: CH

## 2018-05-31 NOTE — ASSESSMENT & PLAN NOTE
- Physical exam findings suggestive of peripheral etiology. Most likely BPPV given positional nature. No recent URI to suggest vestibular neuronitis, although still possible.  No changes in the brainstem and cerebellum on MRI.   - Improving  - Meclizine PRN  - Zofran for nausea  - Physical therapy for particle repositioning exercises

## 2018-05-31 NOTE — DISCHARGE PLANNING
Anticipated Discharge Disposition: D/C to Home    Action: LSW sent pt's prescription for Apixaban to [facility] to run for prior auth and co-pay amount.    Barriers to Discharge: Potential prior auth.    Plan: LSW to await return call from pharmacy with prior auth requirement/co-pay amount. LSW to begin prior auth process if necessary and will notify MD.

## 2018-05-31 NOTE — PROGRESS NOTES
Pt arrived back to the unit with PPU RN. Pt is laying supine, groin sight assessed: CDI, soft to palpation, pt denies and numbness or tingling in lower extremities, pedal pulses 2+ equal bilateral. Pt to remain in supine position until 1645. Pt is awake and alert with no reports of pain or discomfort. Post procedure vitals started. Monitor room called to verify patient on tele; successful ablation from a-flutter to SR; Pt currently SR 63-72

## 2018-05-31 NOTE — NON-PROVIDER
Internal Medicine Medical Student Note  Note Author: Caesar Marquez, Student    Name Edgar Jackson     1960   Age/Sex 57 y.o. male   MRN [id number]   Code Status full     After 5PM or if no immediate response to page, please call for cross-coverage  Attending/Team: Dr. Marcus/Lang See Patient List for primary contact information  Call [phone] to page after hours   1st Call - Day Intern (R1):   Dr. Mccabe 2nd Call - Day Sr. Resident (R2/R3):   Dr. Dumont         Reason for interval visit  (Principal Problem)   Atrial flutter (HCC)    Interval Problem Daily Status Update  (24 hours)   No acute events overnight. Patient reports improved vertigo, now only has minimal symptoms. Able to ambulate to bathroom without any problems. Remained in atrial flutter per tele. Ablation completed this afternoon.     Review of Systems   Constitutional: Negative for chills and fever.   HENT: Negative for hearing loss and tinnitus.    Eyes: Negative for blurred vision.   Respiratory: Negative for shortness of breath.    Cardiovascular: Positive for palpitations. Negative for chest pain.   Gastrointestinal: Negative for abdominal pain, blood in stool, constipation, diarrhea, nausea and vomiting.   Genitourinary: Negative for dysuria and hematuria.   Musculoskeletal: Negative for myalgias.   Skin: Negative for rash.   Neurological: Positive for dizziness (vertigo improved from yesterday). Negative for tingling, sensory change, focal weakness and headaches.               Physical Exam       Vitals:    18 0900 18 1301 18 1316 18 1331   BP:       Pulse:  92 75 75   Resp:  14     Temp:       SpO2:  93% 92% 91%   Weight: 93.8 kg (206 lb 12.7 oz)      Height:         Body mass index is 26.55 kg/m². Weight: 93.8 kg (206 lb 12.7 oz)  Oxygen Therapy:  Pulse Oximetry: 91 %, O2 (LPM): 0, FiO2%: 21 %, O2 Delivery: None (Room Air)    Physical Exam   Constitutional: He is oriented to person, place, and time  and well-developed, well-nourished, and in no distress. No distress.   HENT:   Head: Normocephalic and atraumatic.   Eyes: EOM are normal. Pupils are equal, round, and reactive to light. Right eye exhibits no discharge. Left eye exhibits no discharge. Right eye exhibits no nystagmus. Left eye exhibits no nystagmus.   Cardiovascular: Normal rate.  An irregularly irregular rhythm present. Exam reveals no gallop.    No murmur heard.  Pulmonary/Chest: Breath sounds normal. No respiratory distress. He has no wheezes. He has no rales.   Abdominal: Soft. Bowel sounds are normal. He exhibits no distension. There is no tenderness.   Musculoskeletal: He exhibits no edema.   Neurological: He is alert and oriented to person, place, and time.   Skin: Skin is warm and dry. No rash noted. He is not diaphoretic.   Psychiatric: Mood and affect normal.             Assessment/Plan     #vertigo  -Symptoms much improved from yesterday. Likely benign paroxysmal positional vertigo rather than central cause of vertigo. Head movements exacerbate symptoms and occur while lying down. Patient able to ambulate without difficulty. No other neurologic deficits appreciated. MRI with few scattered punctate areas in the periventricular white matter consistent with ischemic change, demyelination, or gliosis.     Plan:  -PT evaluation placed  -meclizine PRN for vertigo  -ondasetron, prochlorperazine, promethazine PRN for n/v      #atrial flutter  -Likely chronic issue. Patient has been experiencing palpitations for 6 months as well as tachycardia to 220s during exercise. Alcohol is most likely the cause of his atrial flutter given that patient drinks 4 alcoholic beverages per day. Cardiology is following. CHADS-VASc score 0. TSH 1.59. Ablation complete this afternoon.     Plan:  -on tele  -remain overnight to monitor following ablation  -continue apixaban 5mg for at least 4 weeks per cardiology     #alcohol use  -Patient reports drinking 4 alcoholic  beverages per day. Has never experienced withdrawal. Last drink was Monday evening. No signs of withdrawal.     Plan:  -alcohol cessation counseling  -CIWA not necessary as patient showing no signs of withdrawal at 48 hours    #HTN  -BP 140s-160s in hospital. Has not had prior diagnosis of HTN. Further outpatient evaluation warranted given elevated blood pressure is in hospital setting and no history of prior diagnosis.    Plan:  -lisinopril 5mg for BP reduction  -f/u outpatient with PCP

## 2018-05-31 NOTE — THERAPY
consult received; attempted with pt off floor at imaging; will re-attempt as able;     -updated: pt went to cath lab post imaging and had catheter mediated ablation; now bedrest for afternoon; will see tomorrow as appropriate if vertigo symptoms remain; per nsg was actually mobilizing well

## 2018-05-31 NOTE — DISCHARGE PLANNING
Upon utilization review, patient noted to be on the following medications that could potentially require prior authorization if prescribed at discharge: eliquis.  If it is anticipated that patient will require these medications at discharge, beginning the prescription prior auth process in advance to anticipated discharge could assist in preventing delays when patient is medically cleared to be discharged from the hospital.

## 2018-05-31 NOTE — ASSESSMENT & PLAN NOTE
Likely chronic given history of palpitations since 6 months. Most likely exacerbated by alcohol use.  - Not currently in RVR  - CHADsVasc 0 -1 ( denies being diagnosed with hypertension)  - s/p successful catheter mediated ablation today.   - Echo with normal EF, normal valves, enlarged right atrium  - Per cardiology, apixaban to be continued for at least 4 weeks

## 2018-05-31 NOTE — PROGRESS NOTES
Pt arrived from ED.  Patient A&O x 4  RA.  BP on the high side 's.   No complaints of pain or SOB at this time.  Pt placed on tele monitor a flutter in the 60's.  POC discussed with patient.  Patient verbalized understanding.  Call light and belongings with in reach.  Bed locked and in lowest position, alarm and fall precautions in place.

## 2018-05-31 NOTE — ASSESSMENT & PLAN NOTE
- Has 4-5 drinks everyday  - Last drink on Monday night. Denies previous alcohol withdrawal  - Does not appear to be withdrawing on exam at this time

## 2018-05-31 NOTE — OR NURSING
1301: [facility]/VIBHA. Patient is awake and on bedrest/flat X 4 hours. VSS. RLE CMS intact. R groin incision site soft and dressing clean, dry and intact. Vascular access care management per MD order (see assessment). No c/o pain or nausea at this time. Will monitor closely. Vital signs per MD order.   1310: EKG done at bedside.  1345: VSS. R groin soft, CDI. Patient met criteria to transfer out of PPU.

## 2018-05-31 NOTE — OP REPORT
[facility]   Electrophysiology Procedure Note    Procedure(s) Performed:   1) Supraventricular tachycardia ablation (atrial flutter ablation) and electrophysiology study  2) Electroanatomical 3D mapping  3) CS/LA pace and record  4) VIBHA    Indication(s):  Typical atrial flutter    : Ally Bendeict M.D.    Assistant(s): None    Anesthesia: General anesthesia    Specimen(s) Removed: None    Estimated Blood Loss:  20cc    Complications:  None    Description of Procedure:    After informed written consent, the patient was brought to the EP lab in the fasting, non-sedated state. The patient was prepped and draped in the usual sterile fashion. VIBHA was performed to rule out LA thrombus and will be dictated separately. Femoral venous access was obtained using the modified Seldinger technique. In the right femoral vein, 3 sheaths (6,7,8 Fr) were inserted over 0.035” guidewires. A deflectable decapolar catheter was advanced to the CS position. A duodecapolar halo catheter was advanced into the right atrium and positioned along the tricuspid annulus. One 8Fr saline irrigated ablation catheter with 3.5mm distal electrode and location sensor for the CARTO system (Biosense [employer] Thermocool SF) was inserted into an 8Fr sheath (RAMP) for electroanatomical 3D mapping and radiofrequency ablation.     At the end of the procedure, the catheter and sheaths were removed, and hemostasis was achieved by manual compression. Following recovery from anesthesia, the patient was transferred to the PACU in good condition.    Baseline Rhythm:   Atrial flutter  msec    Intervals:   msec   HV 46 msec    Arrhythmias:  1. Sustained typical atrial flutter,  ms with 2:1 AV conduction.     Mapping:  Electroanatomical 3D (CARTO FAM) map of CS and right atrium around the cavotricuspid isthmus was created during CS pacing.    Ablation:  Radiofrequency energy was applied using 3.5 mmsaline irrigated catheter (Biosense ST SF),  power 0 W, total 8 RF applications, RF time 597 seconds to create a cavotricuspid isthmus line between the tricuspid annulus and Eustachian ridge/inferior vena cava to produce bidirectional isthmus conduction block and termination of flutter.    Post Ablation Testin. No inducible atrial flutter with CS pacing post ablation.  2. Trans-isthmus conduction time pacing from proximal  msec.  3. Trans-isthmus conduction time pacing from lateral to the isthmus line 160 msec.  4. Rhythm at end of procedure is sinus bradycardia CL 1573 msec.    Fluoroscopy Time: 3.4 minutes    Impressions/Plan:   1. Typical right atrial flutter.  2. Successful catheter mediated ablation of right atrial flutter.  3. Admit to monitored bed overnight, continue OAC x at least 4 weeks.

## 2018-05-31 NOTE — PROGRESS NOTES
Internal Medicine Interval Note  Note Author: Steve Mccabe M.D.     Name Edgar Jackson 1960   Age/Sex 57 y.o. male   MRN [id number]   Code Status Full code     After 5PM or if no immediate response to page, please call for cross-coverage  Attending/Team: Dr. Marcus/ Lang See Patient List for primary contact information  Call [phone] to page    1st Call - Day Intern (R1):   Dr. Mccabe 2nd Call - Day Sr. Resident (R2/R3):   Dr. Dumont         Reason for interval visit  (Principal Problem)   Atrial flutter (HCC)    Interval Problem Daily Status Update  (24 hours)   - Atrial flutter s/p successful catheter mediated ablation today. Anticoagulation to be continued for at least 4 weeks per cardiology  - Vertigo improving. Physical therapy ordered for repositioning exercises  - Echo shows enlarged right atrium, but no valvular abnormalities. EF normal.    Review of Systems   Constitutional: Negative for chills and fever.   HENT: Negative for ear pain, hearing loss, sore throat and tinnitus.    Eyes: Negative for blurred vision and double vision.   Respiratory: Negative for cough.    Cardiovascular: Negative for chest pain and palpitations.   Gastrointestinal: Negative for abdominal pain, nausea and vomiting.   Neurological: Negative for sensory change, speech change, focal weakness, weakness and headaches.        Vertigo+       Consultants/Specialty  Cardiology  EP service    Disposition  Inpatient for overnight monitoring following ablation of atrial flutter    Quality Measures  Quality-Core Measures   Reviewed items::  EKG reviewed, Labs reviewed and Medications reviewed  Avelar catheter::  No Avelar  DVT: Apixaban.  Ulcer Prophylaxis::  Not indicated          Physical Exam       Vitals:    18 0900 18 1301 18 1316 18 1331   BP:       Pulse:  92 75 75   Resp:  14     Temp:       SpO2:  93% 92% 91%   Weight: 93.8 kg (206 lb 12.7 oz)      Height:         Body mass index is  26.55 kg/m². Weight: 93.8 kg (206 lb 12.7 oz)  Oxygen Therapy:  Pulse Oximetry: 91 %, O2 (LPM): 0, O2 Delivery: None (Room Air)    Physical Exam   Constitutional: He is oriented to person, place, and time and well-developed, well-nourished, and in no distress.   HENT:   Head: Normocephalic and atraumatic.   Eyes: EOM are normal. Pupils are equal, round, and reactive to light.   Non sustained horizontal nystagmus noted on peripheral gaze   Neck: Normal range of motion.   Cardiovascular: Normal rate.    No murmur heard.  Irregularly irregular rhythm    Pulmonary/Chest: Effort normal and breath sounds normal.   Abdominal: Soft. Bowel sounds are normal.   Musculoskeletal: He exhibits no edema.   Neurological: He is alert and oriented to person, place, and time. No cranial nerve deficit.   Skin: No rash noted. No erythema.   Psychiatric: Affect and judgment normal.         Lab Data Review:         5/31/2018  3:32 PM    Recent Labs      05/30/18   1156  05/31/18   0350   SODIUM  139  140   POTASSIUM  3.9  3.7   CHLORIDE  103  107   CO2  24  26   BUN  13  12   CREATININE  1.00  1.03   MAGNESIUM  1.9  1.9   CALCIUM  9.9  9.0       Recent Labs      05/30/18   1156  05/31/18   0350   ALTSGPT  25   --    ASTSGOT  26   --    ALKPHOSPHAT  50   --    TBILIRUBIN  1.6*   --    GLUCOSE  105*  87       Recent Labs      05/30/18   1156   RBC  4.91   HEMOGLOBIN  15.6   HEMATOCRIT  44.9   PLATELETCT  172   PROTHROMBTM  13.7   APTT  25.0   INR  1.08       Recent Labs      05/30/18   1156   WBC  6.1   NEUTSPOLYS  66.00   LYMPHOCYTES  25.50   MONOCYTES  6.70   EOSINOPHILS  1.20   BASOPHILS  0.30   ASTSGOT  26   ALTSGPT  25   ALKPHOSPHAT  50   TBILIRUBIN  1.6*           Assessment/Plan     * Atrial flutter (HCC)   Assessment & Plan    Likely chronic given history of palpitations since 6 months. Most likely exacerbated by alcohol use.  - Not currently in RVR  - CHADsVasc 0 -1 ( denies being diagnosed with hypertension)  - s/p successful  catheter mediated ablation today.   - Echo with normal EF, normal valves, enlarged right atrium  - Per cardiology, apixaban to be continued for at least 4 weeks        Vertigo   Assessment & Plan    - Physical exam findings suggestive of peripheral etiology. Most likely BPPV given positional nature. No recent URI to suggest vestibular neuronitis, although still possible.  No changes in the brainstem and cerebellum on MRI.   - Improving  - Meclizine PRN  - Zofran for nausea  - Physical therapy for particle repositioning exercises        HTN (hypertension)- (present on admission)   Assessment & Plan    - Patient denies being diagnosed with hypertension, not on any medications  - MRI with chronic microvascular ischemic changes and echo with mild left ventricular hypertrophy suggesting that patient may have had hypertension for some time now.  - Lisinopril 5 mg started on admission.  - Monitor        Alcohol use   Assessment & Plan    - Has 4-5 drinks everyday  - Last drink on Monday night. Denies previous alcohol withdrawal  - Does not appear to be withdrawing on exam at this time

## 2018-05-31 NOTE — ASSESSMENT & PLAN NOTE
- Patient denies being diagnosed with hypertension, not on any medications  - MRI with chronic microvascular ischemic changes and echo with mild left ventricular hypertrophy suggesting that patient may have had hypertension for some time now.  - Lisinopril 5 mg started on admission.  - Monitor

## 2018-05-31 NOTE — CARE PLAN
Problem: Communication  Goal: The ability to communicate needs accurately and effectively will improve  Outcome: PROGRESSING AS EXPECTED  Pt educated on the use of the call light and television controls, and oriented to the room, restroom, and unit. Pt educated how to call staff for any issues, problems or concerns. Pt verbalized understanding of all controls, orientation, how to call for needs.     Problem: Infection  Goal: Will remain free from infection  Outcome: PROGRESSING AS EXPECTED  Pt educated on the importance of hand washing to reduce the risk of infection. Pt verbally understands and performs hand hygiene to reduce to risks of infection.

## 2018-05-31 NOTE — PROGRESS NOTES
Assumed care at 1900, bedside report received from day shift RN. Pt. Is Aflutter on the monitor. Initial assessment completed, orders reviewed, call light within reach, bed alarm in use, and hourly rounding in place. POC addressed with patient, no additional questions at this time.

## 2018-06-01 VITALS
SYSTOLIC BLOOD PRESSURE: 137 MMHG | BODY MASS INDEX: 26.88 KG/M2 | HEART RATE: 65 BPM | HEIGHT: 74 IN | RESPIRATION RATE: 13 BRPM | TEMPERATURE: 98.8 F | OXYGEN SATURATION: 97 % | WEIGHT: 209.44 LBS | DIASTOLIC BLOOD PRESSURE: 95 MMHG

## 2018-06-01 LAB
ALBUMIN SERPL BCP-MCNC: 3.4 G/DL (ref 3.2–4.9)
ALBUMIN/GLOB SERPL: 1.6 G/DL
ALP SERPL-CCNC: 36 U/L (ref 30–99)
ALT SERPL-CCNC: 22 U/L (ref 2–50)
ANION GAP SERPL CALC-SCNC: 7 MMOL/L (ref 0–11.9)
AST SERPL-CCNC: 26 U/L (ref 12–45)
BILIRUB SERPL-MCNC: 0.7 MG/DL (ref 0.1–1.5)
BUN SERPL-MCNC: 10 MG/DL (ref 8–22)
CALCIUM SERPL-MCNC: 9.2 MG/DL (ref 8.5–10.5)
CHLORIDE SERPL-SCNC: 105 MMOL/L (ref 96–112)
CO2 SERPL-SCNC: 26 MMOL/L (ref 20–33)
CREAT SERPL-MCNC: 0.93 MG/DL (ref 0.5–1.4)
EKG IMPRESSION: NORMAL
GLOBULIN SER CALC-MCNC: 2.1 G/DL (ref 1.9–3.5)
GLUCOSE SERPL-MCNC: 98 MG/DL (ref 65–99)
MAGNESIUM SERPL-MCNC: 1.7 MG/DL (ref 1.5–2.5)
POTASSIUM SERPL-SCNC: 4 MMOL/L (ref 3.6–5.5)
PROT SERPL-MCNC: 5.5 G/DL (ref 6–8.2)
SODIUM SERPL-SCNC: 138 MMOL/L (ref 135–145)

## 2018-06-01 PROCEDURE — 80053 COMPREHEN METABOLIC PANEL: CPT

## 2018-06-01 PROCEDURE — 97161 PT EVAL LOW COMPLEX 20 MIN: CPT

## 2018-06-01 PROCEDURE — 93005 ELECTROCARDIOGRAM TRACING: CPT | Performed by: INTERNAL MEDICINE

## 2018-06-01 PROCEDURE — 700102 HCHG RX REV CODE 250 W/ 637 OVERRIDE(OP): Performed by: HOSPITALIST

## 2018-06-01 PROCEDURE — 83735 ASSAY OF MAGNESIUM: CPT

## 2018-06-01 PROCEDURE — G8978 MOBILITY CURRENT STATUS: HCPCS | Mod: CI

## 2018-06-01 PROCEDURE — 700102 HCHG RX REV CODE 250 W/ 637 OVERRIDE(OP): Performed by: INTERNAL MEDICINE

## 2018-06-01 PROCEDURE — A9270 NON-COVERED ITEM OR SERVICE: HCPCS | Performed by: STUDENT IN AN ORGANIZED HEALTH CARE EDUCATION/TRAINING PROGRAM

## 2018-06-01 PROCEDURE — G8979 MOBILITY GOAL STATUS: HCPCS | Mod: CI

## 2018-06-01 PROCEDURE — 700102 HCHG RX REV CODE 250 W/ 637 OVERRIDE(OP): Performed by: STUDENT IN AN ORGANIZED HEALTH CARE EDUCATION/TRAINING PROGRAM

## 2018-06-01 PROCEDURE — 700111 HCHG RX REV CODE 636 W/ 250 OVERRIDE (IP): Performed by: STUDENT IN AN ORGANIZED HEALTH CARE EDUCATION/TRAINING PROGRAM

## 2018-06-01 PROCEDURE — A9270 NON-COVERED ITEM OR SERVICE: HCPCS | Performed by: INTERNAL MEDICINE

## 2018-06-01 PROCEDURE — 99217 PR OBSERVATION CARE DISCHARGE: CPT | Performed by: INTERNAL MEDICINE

## 2018-06-01 PROCEDURE — 93010 ELECTROCARDIOGRAM REPORT: CPT | Performed by: INTERNAL MEDICINE

## 2018-06-01 PROCEDURE — 36415 COLL VENOUS BLD VENIPUNCTURE: CPT

## 2018-06-01 PROCEDURE — G0378 HOSPITAL OBSERVATION PER HR: HCPCS

## 2018-06-01 PROCEDURE — A9270 NON-COVERED ITEM OR SERVICE: HCPCS | Performed by: HOSPITALIST

## 2018-06-01 PROCEDURE — G8980 MOBILITY D/C STATUS: HCPCS | Mod: CI

## 2018-06-01 RX ORDER — LISINOPRIL 5 MG/1
5 TABLET ORAL DAILY
Qty: 30 TAB | Refills: 1 | Status: SHIPPED | OUTPATIENT
Start: 2018-06-02 | End: 2018-07-16

## 2018-06-01 RX ORDER — MAGNESIUM SULFATE HEPTAHYDRATE 40 MG/ML
2 INJECTION, SOLUTION INTRAVENOUS ONCE
Status: COMPLETED | OUTPATIENT
Start: 2018-06-01 | End: 2018-06-01

## 2018-06-01 RX ORDER — MECLIZINE HYDROCHLORIDE 25 MG/1
25 TABLET ORAL 3 TIMES DAILY PRN
Qty: 30 TAB | Refills: 0 | Status: SHIPPED | OUTPATIENT
Start: 2018-06-01 | End: 2018-07-16

## 2018-06-01 RX ADMIN — APIXABAN 5 MG: 5 TABLET, FILM COATED ORAL at 05:37

## 2018-06-01 RX ADMIN — MAGNESIUM SULFATE IN WATER 2 G: 40 INJECTION, SOLUTION INTRAVENOUS at 09:10

## 2018-06-01 RX ADMIN — LISINOPRIL 5 MG: 5 TABLET ORAL at 05:37

## 2018-06-01 RX ADMIN — Medication 100 MG: at 05:37

## 2018-06-01 RX ADMIN — THERA TABS 1 TABLET: TAB at 05:37

## 2018-06-01 ASSESSMENT — COGNITIVE AND FUNCTIONAL STATUS - GENERAL
SUGGESTED CMS G CODE MODIFIER MOBILITY: CH
MOBILITY SCORE: 24

## 2018-06-01 ASSESSMENT — ENCOUNTER SYMPTOMS
MUSCULOSKELETAL NEGATIVE: 1
WEIGHT LOSS: 0
CONSTITUTIONAL NEGATIVE: 1
COUGH: 0
SORE THROAT: 0
SHORTNESS OF BREATH: 0
BLURRED VISION: 0
FEVER: 0
CLAUDICATION: 0
SPEECH CHANGE: 0
SEIZURES: 0
DOUBLE VISION: 0
HEARTBURN: 0
ORTHOPNEA: 0
SENSORY CHANGE: 0
VOMITING: 0
ABDOMINAL PAIN: 0
PND: 0
FOCAL WEAKNESS: 0
CHILLS: 0
WHEEZING: 0
LOSS OF CONSCIOUSNESS: 0
RESPIRATORY NEGATIVE: 1
PALPITATIONS: 0
CARDIOVASCULAR NEGATIVE: 1
HEADACHES: 0
PSYCHIATRIC NEGATIVE: 1
FLANK PAIN: 0
DIZZINESS: 0
DIZZINESS: 1
NAUSEA: 0
SPUTUM PRODUCTION: 0

## 2018-06-01 ASSESSMENT — PAIN SCALES - GENERAL
PAINLEVEL_OUTOF10: 6
PAINLEVEL_OUTOF10: 6

## 2018-06-01 ASSESSMENT — GAIT ASSESSMENTS
DEVIATION: OTHER (COMMENT)
GAIT LEVEL OF ASSIST: SUPERVISED
DISTANCE (FEET): 60
ASSISTIVE DEVICE: NONE;OTHER (COMMENTS)

## 2018-06-01 NOTE — PROGRESS NOTES
Notified by monitor room that patient has had several 1.3 to 1.8 second pauses since 19:00 (approximately 5 hours post ablation). Pt remains in SR, complaining of intermittent vertigo/dizziness. Cardiology paged.

## 2018-06-01 NOTE — DISCHARGE INSTRUCTIONS
Discharge Instructions    Discharged to home by car with relative. Discharged via wheelchair, hospital escort: Yes.  Special equipment needed: Not Applicable    Be sure to schedule a follow-up appointment with your primary care doctor or any specialists as instructed.     Discharge Plan:   Diet Plan: Discussed  Activity Level: Discussed  Confirmed Follow up Appointment: Appointment Scheduled  Confirmed Symptoms Management: Discussed  Medication Reconciliation Updated: Yes  Influenza Vaccine Indication: Patient Refuses    I understand that a diet low in cholesterol, fat, and sodium is recommended for good health. Unless I have been given specific instructions below for another diet, I accept this instruction as my diet prescription.   Other diet: Cardiac    Special Instructions: None    · Is patient discharged on Warfarin / Coumadin?   No       Atrial Flutter  Atrial flutter is a type of abnormal heart rhythm (arrhythmia). In atrial flutter, the heartbeat is fast but regular. There are two types of atrial flutter:  · Paroxysmal atrial flutter. This type starts suddenly. It usually stops on its own soon after it starts.  · Permanent atrial flutter. This type does not go away.  What are the causes?  This condition may be caused by:  · A heart condition or problem, such as:  ¨ A heart attack.  ¨ Heart failure.  ¨ A heart valve problem.  · A lung problem, such as:  ¨ A blood clot in the lungs (pulmonary embolism, or PE).  ¨ Chronic obstructive pulmonary disease.  · Poorly controlled high blood pressure (hypertension).  · Hyperthyroidism.  · Caffeine.  · Some decongestant cold medicines.  · Low levels of minerals called electrolytes in the blood.  · Cocaine.  What increases the risk?  This condition is more likely to develop in:  · Elderly adults.  · Men.  What are the signs or symptoms?  Symptoms of this condition include:  · A feeling that your heart is pounding or racing (palpitations).  · Shortness of breath.  · Chest  pain.  · Feeling light-headed.  · Dizziness.  · Fainting.  How is this diagnosed?  This condition may be diagnosed with tests, including:  · An electrocardiogram (ECG). This is a painless test that records electrical signals in the heart.  · Holter monitoring. For this test, you wear a device that records your heartbeat for 1-2 days.  · Cardiac event monitoring. For this test, you wear a device that records your heartbeat for up to 30 days.  · An echocardiogram. This is a painless test that uses sound waves to make a picture of your heart.  · Stress test. This test records your heartbeat while you exercise.  · Blood tests.  How is this treated?  This condition may be treated with:  · Treatment of any underlying conditions.  · Medicine to make your heart beat more slowly.  · Medicine to keep the condition from coming back.  · A procedure to keep the condition under control. Some procedures to do this include:  ¨ Cardioversion. During this procedure, medicines or an electrical shock are given to make the heart beat normally.  ¨ Ablation. During this procedure, the heart tissue that is causing the problem is destroyed. This procedure may be done if atrial flutter lasts a long time or happens often.  Follow these instructions at home:  · Take over-the-counter and prescription medicines only as told by your health care provider.  · Do not take any new medicines without talking to your health care provider.  · Do not use tobacco products, including cigarettes, chewing tobacco, or e-cigarettes. If you need help quitting, ask your health care provider.  · Limit alcohol intake to no more than 1 drink per day for nonpregnant women and 2 drinks per day for men. One drink equals 12 oz of beer, 5 oz of wine, or 1½ oz of hard liquor.  · Try to reduce any stress. Stress can make your symptoms worse.  Contact a health care provider if:  · Your symptoms get worse.  Get help right away if:  · You are dizzy.  · You feel like fainting  or you faint.  · You have shortness of breath.  · You feel pain or pressure in your chest.  · You suddenly feel nauseous or you suddenly vomit.  · There is a sudden change in your ability to speak, eat, or move.  · You are sweating a lot for no reason.  This information is not intended to replace advice given to you by your health care provider. Make sure you discuss any questions you have with your health care provider.  Document Released: 05/06/2010 Document Revised: 04/26/2017 Document Reviewed: 07/01/2016  [employer] Interactive Patient Education © 2017 [employer]      Cardiac Ablation  Cardiac ablation is a procedure to stop some heart tissue from causing problems. The heart has many electrical connections. Sometimes these connections cause the heart to beat very fast or irregularly. Removing some of the problem areas can improve heart rhythm or make it normal. Ablation is done for people who:  · Have Omar-Parkinson-White syndrome.  · Have other fast heart rhythms (tachycardia).  · Have taken medicines for an abnormal heart rhythm (arrhythmia) and the medicines had:  ¨ No success.  ¨ Side effects.  · May have a type of heartbeat that could cause death.  What happens before the procedure?  · Follow instructions from your doctor about eating and drinking before the procedure.  · Take your medicines as told by your doctor. Take them at regular times with water unless told differently by your doctor.  · If you are taking diabetes medicine, ask your doctor how to take it. Ask if there are any special instructions you should follow. Your doctor may change how much insulin you take the day of the procedure.  What happens during the procedure?  · A special type of X-ray will be used. The X-ray helps your doctor see images of your heart during the procedure.  · A small cut (incision) will be made in your neck or groin.  · An IV tube will be started before the procedure begins.  · You will be given a numbing medicine  (anesthetic) or a medicine to help you relax (sedative).  · The skin on your neck or groin will be numbed.  · A needle will be put into a large vein in your neck or groin.  · A thin, flexible tube (catheter) will be put in to reach your heart.  · A dye will be put in the tube. The dye will show up on X-rays. It will help your doctor see the area of the heart that needs treatment.  · When the heart tissue that is causing problems is found, the tip of the tube will send an electrical current to it. This will stop it from causing problems.  · The tube will be taken out.  · Pressure will be put on the area where the tube was. This will keep it from bleeding. A bandage will be placed over the area.  What happens after the procedure?  · You will be taken to a recovery area. Your blood pressure, heart rate, and breathing will be watched. The area where the tube was will also be watched for bleeding.  · You will need to lie still for 4-6 hours. This keeps the area where the tube was from bleeding.  This information is not intended to replace advice given to you by your health care provider. Make sure you discuss any questions you have with your health care provider.  Document Released: 08/20/2014 Document Revised: 05/25/2017 Document Reviewed: 05/15/2014  [employer] Interactive Patient Education © 2017 [employer]    Groin Site Care  Refer to this sheet in the next few weeks. These instructions provide you with information on caring for yourself after your procedure. Your caregiver may also give you more specific instructions. Your treatment has been planned according to current medical practices, but problems sometimes occur. Call your caregiver if you have any problems or questions after your procedure.  HOME CARE INSTRUCTIONS  · You may shower 24 hours after the procedure. Remove the bandage (dressing) and gently wash the site with plain soap and water. Gently pat the site dry.  · Do not apply powder or lotion to the  site.  · Do not sit in a bathtub, swimming pool, or whirlpool for 5 to 7 days.  · No bending, squatting, or lifting anything over 10 pounds (4.5 kg) as directed by your caregiver.  · Inspect the site at least twice daily.  · Do not drive home if you are discharged the same day of the procedure. Have someone else drive you.  · You may drive 24 hours after the procedure unless otherwise instructed by your caregiver.  What to expect:  · Any bruising will usually fade within 1 to 2 weeks.  · Blood that collects in the tissue (hematoma) may be painful to the touch. It should usually decrease in size and tenderness within 1 to 2 weeks.  SEEK IMMEDIATE MEDICAL CARE IF:  · You have unusual pain at the groin site or down the affected leg.  · You have redness, warmth, swelling, or pain at the groin site.  · You have drainage (other than a small amount of blood on the dressing).  · You have chills.  · You have a fever or persistent symptoms for more than 72 hours.  · You have a fever and your symptoms suddenly get worse.  · Your leg becomes pale, cool, tingly, or numb.  · You have heavy bleeding from the site. Hold pressure on the site.  Document Released: 01/20/2012 Document Revised: 03/11/2013 Document Reviewed: 01/20/2012  [employer]® Patient Information ©2014 [employer].    Lisinopril tablets  What is this medicine?  LISINOPRIL (lyse IN oh pril) is an ACE inhibitor. This medicine is used to treat high blood pressure and heart failure. It is also used to protect the heart immediately after a heart attack.  This medicine may be used for other purposes; ask your health care provider or pharmacist if you have questions.  COMMON BRAND NAME(S): Prinivil, Zestril  What should I tell my health care provider before I take this medicine?  They need to know if you have any of these conditions:  -diabetes  -heart or blood vessel disease  -kidney disease  -low blood pressure  -previous swelling of the tongue, face, or lips with  difficulty breathing, difficulty swallowing, hoarseness, or tightening of the throat  -an unusual or allergic reaction to lisinopril, other ACE inhibitors, insect venom, foods, dyes, or preservatives  -pregnant or trying to get pregnant  -breast-feeding  How should I use this medicine?  Take this medicine by mouth with a glass of water. Follow the directions on your prescription label. You may take this medicine with or without food. If it upsets your stomach, take it with food. Take your medicine at regular intervals. Do not take it more often than directed. Do not stop taking except on your doctor's advice.  Talk to your pediatrician regarding the use of this medicine in children. Special care may be needed. While this drug may be prescribed for children as young as 6 years of age for selected conditions, precautions do apply.  Overdosage: If you think you have taken too much of this medicine contact a poison control center or emergency room at once.  NOTE: This medicine is only for you. Do not share this medicine with others.  What if I miss a dose?  If you miss a dose, take it as soon as you can. If it is almost time for your next dose, take only that dose. Do not take double or extra doses.  What may interact with this medicine?  Do not take this medicine with any of the following medications:  -hymenoptera venom  -sacubitril; valsartan  This medicines may also interact with the following medications:  -aliskiren  -angiotensin receptor blockers, like losartan or valsartan  -certain medicines for diabetes  -diuretics  -everolimus  -gold compounds  -lithium  -NSAIDs, medicines for pain and inflammation, like ibuprofen or naproxen  -potassium salts or supplements  -salt substitutes  -sirolimus  -temsirolimus  This list may not describe all possible interactions. Give your health care provider a list of all the medicines, herbs, non-prescription drugs, or dietary supplements you use. Also tell them if you smoke,  drink alcohol, or use illegal drugs. Some items may interact with your medicine.  What should I watch for while using this medicine?  Visit your doctor or health care professional for regular check ups. Check your blood pressure as directed. Ask your doctor what your blood pressure should be, and when you should contact him or her.  Do not treat yourself for coughs, colds, or pain while you are using this medicine without asking your doctor or health care professional for advice. Some ingredients may increase your blood pressure.  Women should inform their doctor if they wish to become pregnant or think they might be pregnant. There is a potential for serious side effects to an unborn child. Talk to your health care professional or pharmacist for more information.  Check with your doctor or health care professional if you get an attack of severe diarrhea, nausea and vomiting, or if you sweat a lot. The loss of too much body fluid can make it dangerous for you to take this medicine.  You may get drowsy or dizzy. Do not drive, use machinery, or do anything that needs mental alertness until you know how this drug affects you. Do not stand or sit up quickly, especially if you are an older patient. This reduces the risk of dizzy or fainting spells. Alcohol can make you more drowsy and dizzy. Avoid alcoholic drinks.  Avoid salt substitutes unless you are told otherwise by your doctor or health care professional.  What side effects may I notice from receiving this medicine?  Side effects that you should report to your doctor or health care professional as soon as possible:  -allergic reactions like skin rash, itching or hives, swelling of the hands, feet, face, lips, throat, or tongue  -breathing problems  -signs and symptoms of kidney injury like trouble passing urine or change in the amount of urine  -signs and symptoms of increased potassium like muscle weakness; chest pain; or fast, irregular heartbeat  -signs and  symptoms of liver injury like dark yellow or brown urine; general ill feeling or flu-like symptoms; light-colored stools; loss of appetite; nausea; right upper belly pain; unusually weak or tired; yellowing of the eyes or skin  -signs and symptoms of low blood pressure like dizziness; feeling faint or lightheaded, falls; unusually weak or tired  -stomach pain with or without nausea and vomiting  Side effects that usually do not require medical attention (report to your doctor or health care professional if they continue or are bothersome):  -changes in taste  -cough  -dizziness  -fever  -headache  -sensitivity to light  This list may not describe all possible side effects. Call your doctor for medical advice about side effects. You may report side effects to FDA at 2-097-FDA-0266.  Where should I keep my medicine?  Keep out of the reach of children.  Store at room temperature between 15 and 30 degrees C (59 and 86 degrees F). Protect from moisture. Keep container tightly closed. Throw away any unused medicine after the expiration date.  NOTE: This sheet is a summary. It may not cover all possible information. If you have questions about this medicine, talk to your doctor, pharmacist, or health care provider.  © 2018 [employer] (2017-02-06 12:52:35)    Apixaban oral tablets  What is this medicine?  APIXABAN (a PIX a ban) is an anticoagulant (blood thinner). It is used to lower the chance of stroke in people with a medical condition called atrial fibrillation. It is also used to treat or prevent blood clots in the lungs or in the veins.  This medicine may be used for other purposes; ask your health care provider or pharmacist if you have questions.  COMMON BRAND NAME(S): Eliquis  What should I tell my health care provider before I take this medicine?  They need to know if you have any of these conditions:  -bleeding disorders  -bleeding in the brain  -blood in your stools (black or tarry stools) or if you  have blood in your vomit  -history of stomach bleeding  -kidney disease  -liver disease  -mechanical heart valve  -an unusual or allergic reaction to apixaban, other medicines, foods, dyes, or preservatives  -pregnant or trying to get pregnant  -breast-feeding  How should I use this medicine?  Take this medicine by mouth with a glass of water. Follow the directions on the prescription label. You can take it with or without food. If it upsets your stomach, take it with food. Take your medicine at regular intervals. Do not take it more often than directed. Do not stop taking except on your doctor's advice. Stopping this medicine may increase your risk of a blot clot. Be sure to refill your prescription before you run out of medicine.  Talk to your pediatrician regarding the use of this medicine in children. Special care may be needed.  Overdosage: If you think you have taken too much of this medicine contact a poison control center or emergency room at once.  NOTE: This medicine is only for you. Do not share this medicine with others.  What if I miss a dose?  If you miss a dose, take it as soon as you can. If it is almost time for your next dose, take only that dose. Do not take double or extra doses.  What may interact with this medicine?  This medicine may interact with the following:  -aspirin and aspirin-like medicines  -certain medicines for fungal infections like ketoconazole and itraconazole  -certain medicines for seizures like carbamazepine and phenytoin  -certain medicines that treat or prevent blood clots like warfarin, enoxaparin, and dalteparin  -clarithromycin  -NSAIDs, medicines for pain and inflammation, like ibuprofen or naproxen  -rifampin  -ritonavir  -Dino's wort  This list may not describe all possible interactions. Give your health care provider a list of all the medicines, herbs, non-prescription drugs, or dietary supplements you use. Also tell them if you smoke, drink alcohol, or use  illegal drugs. Some items may interact with your medicine.  What should I watch for while using this medicine?  Visit your doctor or health care professional for regular checks on your progress.  Notify your doctor or health care professional and seek emergency treatment if you develop breathing problems; changes in vision; chest pain; severe, sudden headache; pain, swelling, warmth in the leg; trouble speaking; sudden numbness or weakness of the face, arm or leg. These can be signs that your condition has gotten worse.  If you are going to have surgery or other procedure, tell your doctor that you are taking this medicine.  What side effects may I notice from receiving this medicine?  Side effects that you should report to your doctor or health care professional as soon as possible:  -allergic reactions like skin rash, itching or hives, swelling of the face, lips, or tongue  -signs and symptoms of bleeding such as bloody or black, tarry stools; red or dark-brown urine; spitting up blood or brown material that looks like coffee grounds; red spots on the skin; unusual bruising or bleeding from the eye, gums, or nose  This list may not describe all possible side effects. Call your doctor for medical advice about side effects. You may report side effects to FDA at 8-454-NMU-0607.  Where should I keep my medicine?  Keep out of the reach of children.  Store at room temperature between 20 and 25 degrees C (68 and 77 degrees F). Throw away any unused medicine after the expiration date.  NOTE: This sheet is a summary. It may not cover all possible information. If you have questions about this medicine, talk to your doctor, pharmacist, or health care provider.  © 2018 [employer] (2017-07-10 11:54:23)    Meclizine tablets or capsules  What is this medicine?  MECLIZINE (MEK li zeen) is an antihistamine. It is used to prevent nausea, vomiting, or dizziness caused by motion sickness. It is also used to prevent and  treat vertigo (extreme dizziness or a feeling that you or your surroundings are tilting or spinning around).  This medicine may be used for other purposes; ask your health care provider or pharmacist if you have questions.  COMMON BRAND NAME(S): Antivert, Dramamine Less Drowsy, Medivert, Meni-D  What should I tell my health care provider before I take this medicine?  They need to know if you have any of these conditions:  -glaucoma  -lung or breathing disease, like asthma  -problems urinating  -prostate disease  -stomach or intestine problems  -an unusual or allergic reaction to meclizine, other medicines, foods, dyes, or preservatives  -pregnant or trying to get pregnant  -breast-feeding  How should I use this medicine?  Take this medicine by mouth with a glass of water. Follow the directions on the prescription label. If you are using this medicine to prevent motion sickness, take the dose at least 1 hour before travel. If it upsets your stomach, take it with food or milk. Take your doses at regular intervals. Do not take your medicine more often than directed.  Talk to your pediatrician regarding the use of this medicine in children. Special care may be needed.  Overdosage: If you think you have taken too much of this medicine contact a poison control center or emergency room at once.  NOTE: This medicine is only for you. Do not share this medicine with others.  What if I miss a dose?  If you miss a dose, take it as soon as you can. If it is almost time for your next dose, take only that dose. Do not take double or extra doses.  What may interact with this medicine?  Do not take this medicine with any of the following medications:  -MAOIs like Carbex, Eldepryl, Marplan, Nardil, and Parnate  This medicine may also interact with the following medications:  -alcohol  -antihistamines for allergy, cough and cold  -certain medicines for anxiety or sleep  -certain medicines for depression, like amitriptyline,  fluoxetine, sertraline  -certain medicines for seizures like phenobarbital, primidone  -general anesthetics like halothane, isoflurane, methoxyflurane, propofol  -local anesthetics like lidocaine, pramoxine, tetracaine  -medicines that relax muscles for surgery  -narcotic medicines for pain  -phenothiazines like chlorpromazine, mesoridazine, prochlorperazine, thioridazine  This list may not describe all possible interactions. Give your health care provider a list of all the medicines, herbs, non-prescription drugs, or dietary supplements you use. Also tell them if you smoke, drink alcohol, or use illegal drugs. Some items may interact with your medicine.  What should I watch for while using this medicine?  Tell your doctor or healthcare professional if your symptoms do not start to get better or if they get worse.  You may get drowsy or dizzy. Do not drive, use machinery, or do anything that needs mental alertness until you know how this medicine affects you. Do not stand or sit up quickly, especially if you are an older patient. This reduces the risk of dizzy or fainting spells. Alcohol may interfere with the effect of this medicine. Avoid alcoholic drinks.  Your mouth may get dry. Chewing sugarless gum or sucking hard candy, and drinking plenty of water may help. Contact your doctor if the problem does not go away or is severe.  This medicine may cause dry eyes and blurred vision. If you wear contact lenses you may feel some discomfort. Lubricating drops may help. See your eye doctor if the problem does not go away or is severe.  What side effects may I notice from receiving this medicine?  Side effects that you should report to your doctor or health care professional as soon as possible:  -feeling faint or lightheaded, falls  -fast, irregular heartbeat  Side effects that usually do not require medical attention (report to your doctor or health care professional if they continue or are  bothersome):  -constipation  -headache  -trouble passing urine or change in the amount of urine  -trouble sleeping  -upset stomach  This list may not describe all possible side effects. Call your doctor for medical advice about side effects. You may report side effects to FDA at 4-942-BMN-4797.  Where should I keep my medicine?  Keep out of the reach of children.  Store at room temperature between 15 and 30 degrees C (59 and 86 degrees F). Keep container tightly closed. Throw away any unused medicine after the expiration date.  NOTE: This sheet is a summary. It may not cover all possible information. If you have questions about this medicine, talk to your doctor, pharmacist, or health care provider.  © 2018 [employer]vier/Gold Standard (2017-01-18 19:41:02)    Depression / Suicide Risk    As you are discharged from this [facility] facility, it is important to learn how to keep safe from harming yourself.    Recognize the warning signs:  · Abrupt changes in personality, positive or negative- including increase in energy   · Giving away possessions  · Change in eating patterns- significant weight changes-  positive or negative  · Change in sleeping patterns- unable to sleep or sleeping all the time   · Unwillingness or inability to communicate  · Depression  · Unusual sadness, discouragement and loneliness  · Talk of wanting to die  · Neglect of personal appearance   · Rebelliousness- reckless behavior  · Withdrawal from people/activities they love  · Confusion- inability to concentrate     If you or a loved one observes any of these behaviors or has concerns about self-harm, here's what you can do:  · Talk about it- your feelings and reasons for harming yourself  · Remove any means that you might use to hurt yourself (examples: pills, rope, extension cords, firearm)  · Get professional help from the community (Mental Health, Substance Abuse, psychological counseling)  · Do not be alone:Call your Safe Contact- someone  whom you trust who will be there for you.  · Call your local CRISIS HOTLINE 822-7186 or 998-403-3974  · Call your local Children's Mobile Crisis Response Team [place] (640) 076-7040 or www.[employer]  · Call the toll free National Suicide Prevention Hotlines   · National Suicide Prevention Lifeline 256-087-LTUX (1677)  · [employer] Line Network 800-SUICIDE (643-1602)

## 2018-06-01 NOTE — PROGRESS NOTES
[facility] Cardiology Progress Note     Name Edgar Jackson     1960   Age/Sex 57 y.o. male   MRN [id number]         Chief complaint/ reason for interval visit (Primary Diagnosis)   Dizziness/Aflutter   57 y.o. male with a past medical history of dyslipidemia, not on any medications(only supplements) who presented to urgent care with dizziness and nausea was noted to be atrial flutter and sent to the ED.     Interval Problem Daily Status Update     - feels better   - Ablation>>>sinus         Review of Systems   Constitutional: Negative.    Respiratory: Negative.    Cardiovascular: Negative.    Musculoskeletal: Negative.    Neurological: Positive for dizziness.   Psychiatric/Behavioral: Negative.            Quality Measures  Quality-Core Measures        Physical Exam       Vitals:    18 1600 18 1630 18 1730 18 1830   BP: 133/106 146/99 139/83 122/86   Pulse: 79 67 81 69   Resp:    Temp: 36.2 °C (97.1 °F) 36.2 °C (97.1 °F) 36.7 °C (98.1 °F) 37 °C (98.6 °F)   SpO2: 93% 95% 94% 93%   Weight:       Height:         Body mass index is 26.55 kg/m². Weight: 93.8 kg (206 lb 12.7 oz)  Oxygen Therapy:  Pulse Oximetry: 93 %, O2 (LPM): 0, O2 Delivery: None (Room Air)    Physical Exam   Constitutional: He is oriented to person, place, and time. No distress.   Neck: No JVD present.   Cardiovascular: Normal rate.    No murmur heard.  irregular    Abdominal: He exhibits no distension. There is no tenderness. There is no rebound and no guarding.   Neurological: He is alert and oriented to person, place, and time. No cranial nerve deficit.   Skin: No erythema.         Lab Data Review:      2018  6:56 PM    Recent Labs      18   1156  18   0350   SODIUM  139  140   POTASSIUM  3.9  3.7   CHLORIDE  103  107   CO2  24  26   BUN  13  12   CREATININE  1.00  1.03   MAGNESIUM  1.9  1.9   CALCIUM  9.9  9.0       Recent Labs      18   1156  18   0350    ALTSGPT  25   --    ASTSGOT  26   --    ALKPHOSPHAT  50   --    TBILIRUBIN  1.6*   --    GLUCOSE  105*  87       Recent Labs      05/30/18   1156   RBC  4.91   HEMOGLOBIN  15.6   HEMATOCRIT  44.9   PLATELETCT  172   PROTHROMBTM  13.7   APTT  25.0   INR  1.08       Recent Labs      05/30/18   1156   WBC  6.1   NEUTSPOLYS  66.00   LYMPHOCYTES  25.50   MONOCYTES  6.70   EOSINOPHILS  1.20   BASOPHILS  0.30   ASTSGOT  26   ALTSGPT  25   ALKPHOSPHAT  50   TBILIRUBIN  1.6*           Assessment/Plan      58 yo fairly healthy male with PMHx significant only for dyslipidemia, came for dizziness and we found new Aflutter:       # Atrial flutter:  - new diagnosis but likely he has it for months  - Echo no abnormalities and EF 60%  - TSH normal  - likely related to alcoholism  - HDW2BR5: 1  - treated with ablation>>>sinus.   - EP on board.  - On eliquis         # Dizziness:   - vertigo   - Could be BPPV   - MRI no acute stroke   - improved       # HTN:   - controlled   - lisinoprol 10 mg   - follow up.

## 2018-06-01 NOTE — PROGRESS NOTES
Pt has orders for discharge. Pt educated on discharge instructions and new prescriptions.  Pt prescription approval for Eliquis samples verified by SW before pt discharge.  Pt verbalizes understanding.  Follow up appointment made with cardiologist and PCP. PIV removed, monitor checked in.  Pt going home with relative. RN to call transport for escort out, will monitor pt until off unit.

## 2018-06-01 NOTE — PROGRESS NOTES
Cardiology Progress Note               Author: Jillian Mcgowan Date & Time created: 6/1/2018  10:22 AM     Interval History:  Patient seen on EPS rounds.  Consultative note per Dr Benedict:  HPI:  Patient is a 58 yo M with history of alcohol dependency (says he has 4-5 drinks a night, beer/liquor, no history of w/ drawal), and remote neck surgery, who presents with dizziness. He says symptoms started on Monday. He was out working in the yard. He said it happened when he was bending over and turning his head. Feels like he is unstable and about to fall. Associated with nausea. He vomited once. Comes on again with head movement. Symptoms did not get better he went to urgent care. He was found to have an irregular rhythm. In ED here 12 lead shows typical atrial flutter with variable block. On further questioning, he works out with weights and interval training and for the last 6 months, his HR has been skyrocketing with work outs. Minimal discomfort with this though. EP asked to consider flutter ablation for him.     Impression/Plan:  1)Typical atrial flutter  2)Alcohol dependency  3)Vertigo     -His symptoms are consistent with positional vertigo, I warned him that addressing his arrhythmia may not affect his dizziness  -That being said, it sounds like he has been symptomatic from flutter for about 6 months, and paroxysmally before that  -Likely alcohol is a big contributor and I warned him that even after flutter ablation there is a good likelihood we are left with pAF  -Details of procedure, risks/benefits, were discussed, and all his questions were answered  -Keep NPO AM, we will plan for VIBHA flutter ablation tomorrow  -Start OAC tonight with Eliquis    EPS 5/31/18 with Dr Benedict:   Ablation:  Radiofrequency energy was applied using 3.5 mmsaline irrigated catheter (BiosCheck ST Allclasses), power 0 W, total 8 RF applications, RF time 597 seconds to create a cavotricuspid isthmus line between the tricuspid annulus and Eustachian  ridge/inferior vena cava to produce bidirectional isthmus conduction block and termination of flutter.     Post Ablation Testin. No inducible atrial flutter with CS pacing post ablation.  2. Trans-isthmus conduction time pacing from proximal  msec.  3. Trans-isthmus conduction time pacing from lateral to the isthmus line 160 msec.  4. Rhythm at end of procedure is sinus bradycardia CL 1573 msec.     Fluoroscopy Time: 3.4 minutes     Impressions/Plan:   1. Typical right atrial flutter.  2. Successful catheter mediated ablation of right atrial flutter.  3. Admit to monitored bed overnight, continue OAC x at least 4 weeks.       Chief Complaint:  AFL    Review of Systems   Constitutional: Negative for chills, fever, malaise/fatigue and weight loss.   HENT: Negative for congestion and sore throat.    Eyes: Negative for blurred vision and double vision.   Respiratory: Negative for cough, sputum production, shortness of breath and wheezing.    Cardiovascular: Negative for chest pain, palpitations, orthopnea, claudication, leg swelling and PND.   Gastrointestinal: Negative for abdominal pain, heartburn, nausea and vomiting.   Genitourinary: Negative for dysuria, flank pain and frequency.   Musculoskeletal: Negative.    Skin: Negative.    Neurological: Negative for dizziness, speech change, focal weakness, seizures, loss of consciousness and headaches.   Endo/Heme/Allergies: Negative.    Psychiatric/Behavioral: Negative.        Physical Exam   Constitutional: He is oriented to person, place, and time. He appears well-developed and well-nourished.   HENT:   Head: Normocephalic and atraumatic.   Eyes: Pupils are equal, round, and reactive to light.   Neck: Normal range of motion. Neck supple. No thyromegaly present.   Cardiovascular: Normal rate and regular rhythm.  Exam reveals no gallop and no friction rub.    No murmur heard.  Pulmonary/Chest: Effort normal and breath sounds normal. No respiratory distress. He  has no wheezes. He has no rales.   Right femoral access site uncomplicated.   Abdominal: Soft. Bowel sounds are normal. He exhibits no distension. There is no tenderness. There is no guarding.   Musculoskeletal: Normal range of motion. He exhibits no edema.   Neurological: He is alert and oriented to person, place, and time.   Skin: Skin is warm and dry.   Psychiatric: He has a normal mood and affect.       Hemodynamics:  Temp (24hrs), Av.6 °C (97.9 °F), Min:35.9 °C (96.7 °F), Max:37.3 °C (99.1 °F)  Temperature: 37 °C (98.6 °F)  Pulse  Av.5  Min: 54  Max: 100Heart Rate (Monitored): 74  Blood Pressure: 150/96 (nurse notified), NIBP: 140/94     Respiratory:    Respiration: 15, Pulse Oximetry: 94 %     Work Of Breathing / Effort: Mild  RUL Breath Sounds: Clear, RML Breath Sounds: Clear, RLL Breath Sounds: Clear, JJ Breath Sounds: Clear, LLL Breath Sounds: Clear  Fluids:  Date 18 0700 - 18 0659   Shift 9849-6692 9927-7085 7536-2294 24 Hour Total   I  N  T  A  K  E   P.O. 240   240    Shift Total 240   240   O  U  T  P  U  T   Shift Total       Weight (kg) 95 95 95 95       Weight: 95 kg (209 lb 7 oz)  GI/Nutrition:  Orders Placed This Encounter   Procedures   • Diet Order     Standing Status:   Standing     Number of Occurrences:   1     Order Specific Question:   Diet:     Answer:   Cardiac [6]     Lab Results:  Recent Labs      18   1156   WBC  6.1   RBC  4.91   HEMOGLOBIN  15.6   HEMATOCRIT  44.9   MCV  91.4   MCH  31.8   MCHC  34.7   RDW  41.5   PLATELETCT  172   MPV  10.3     Recent Labs      18   1156  18   0350  18   0353   SODIUM  139  140  138   POTASSIUM  3.9  3.7  4.0   CHLORIDE  103  107  105   CO2  24  26  26   GLUCOSE  105*  87  98   BUN  13  12  10   CREATININE  1.00  1.03  0.93   CALCIUM  9.9  9.0  9.2     Recent Labs      18   1156   APTT  25.0   INR  1.08     Recent Labs      18   1156   BNPBTYPENAT  52     Recent Labs      18   1153   05/30/18 1912   TROPONINI  0.01  0.01   BNPBTYPENAT  52   --              Medical Decision Making, by Problem:  Active Hospital Problems    Diagnosis   • *Atrial flutter (HCC) [I48.92]   • Vertigo [R42]   • HTN (hypertension) [I10]   • Alcohol use [Z78.9]       Plan:  Short pauses during the night.  Recommend OPO as out patient.  Continue OAC for 4 weeks post AFL ablation.  Catheter site uncomplicated.  To remove dressing in the shower tomorrow.  No tub bathing or heavy lifting X 1 week.  To report any palpitations.  Follow up in our office in 1 week.  EPS will sign off thank you for this consultation.      Quality-Core Measures   Reviewed items::  EKG reviewed, Labs reviewed and Medications reviewed

## 2018-06-01 NOTE — PROGRESS NOTES
[facility]     Name Edgar Jackson     1960   Age/Sex 57 y.o. male   MRN [id number]         Chief complaint/ reason for interval visit (Primary Diagnosis)   Dizziness/Aflutter   57 y.o. male with a past medical history of dyslipidemia, not on any medications(only supplements) who presented to urgent care with dizziness and nausea was noted to be atrial flutter and sent to the ED.     Interval Problem Daily Status Update     - stays sinuc after ablation   - no events last night         Review of Systems   Constitutional: Negative.    Respiratory: Negative.    Cardiovascular: Negative.    Musculoskeletal: Negative.    Neurological: Positive for dizziness.   Psychiatric/Behavioral: Negative.            Quality Measures  Quality-Core Measures        Physical Exam       Vitals:    18 0541 18 0719 18 0721 18 1103   BP: 146/104 152/104 150/96 137/95   Pulse: 71  65 65   Resp: 16  15 13   Temp: 37.2 °C (98.9 °F)  37 °C (98.6 °F) 37.1 °C (98.8 °F)   SpO2: 95%  94% 97%   Weight:       Height:         Body mass index is 26.89 kg/m². Weight: 95 kg (209 lb 7 oz)  Oxygen Therapy:  Pulse Oximetry: 97 %, O2 (LPM): 0, O2 Delivery: None (Room Air)    Physical Exam   Constitutional: He is oriented to person, place, and time. No distress.   Neck: No JVD present.   Cardiovascular: Normal rate.    No murmur heard.  irregular    Abdominal: He exhibits no distension. There is no tenderness. There is no rebound and no guarding.   Neurological: He is alert and oriented to person, place, and time. No cranial nerve deficit.   Skin: No erythema.         Lab Data Review:      2018  6:56 PM    Recent Labs      18   1156  18   0350  18   0353   SODIUM  139  140  138   POTASSIUM  3.9  3.7  4.0   CHLORIDE  103  107  105   CO2  24  26  26   BUN  13  12  10   CREATININE  1.00  1.03  0.93   MAGNESIUM  1.9  1.9  1.7   CALCIUM  9.9  9.0  9.2       Recent  Labs      05/30/18   1156  05/31/18   0350  06/01/18   0353   ALTSGPT  25   --   22   ASTSGOT  26   --   26   ALKPHOSPHAT  50   --   36   TBILIRUBIN  1.6*   --   0.7   GLUCOSE  105*  87  98       Recent Labs      05/30/18   1156   RBC  4.91   HEMOGLOBIN  15.6   HEMATOCRIT  44.9   PLATELETCT  172   PROTHROMBTM  13.7   APTT  25.0   INR  1.08       Recent Labs      05/30/18   1156  06/01/18   0353   WBC  6.1   --    NEUTSPOLYS  66.00   --    LYMPHOCYTES  25.50   --    MONOCYTES  6.70   --    EOSINOPHILS  1.20   --    BASOPHILS  0.30   --    ASTSGOT  26  26   ALTSGPT  25  22   ALKPHOSPHAT  50  36   TBILIRUBIN  1.6*  0.7           Assessment/Plan      58 yo fairly healthy male with PMHx significant only for dyslipidemia, came for dizziness and we found new Aflutter:       # Atrial flutter:   - new diagnosis but likely he has it for months   - Echo no abnormalities and EF 60%   - likely related to alcoholism   - BYU6NO5: 1   - treated with ablation>>>sinus.    - On eliquis for 1 month and reevaluate him after that in the cardio clinic for long term anticoagulation.    - Follow up with cradiac clinic in 1 week and consider starting with BB if needed.    - Discussed with the patient about his situation and answered all his questions.   - Alcohol cessation       # Dizziness:    - vertigo    - Could be BPPV    - MRI no acute stroke    - improved    - stay hydration       # HTN:    - controlled    - lisinoprol 10 mg    - follow up closely with PCP and add BB if needed      Thank you for this consult, cardiology team will sign off.

## 2018-06-01 NOTE — THERAPY
"Physical Therapy Evaluation completed.   Bed Mobility:  Supine to Sit: Supervised  Transfers: Sit to Stand: Supervised  Gait: Level Of Assist: Supervised with No Equipment Needed and IV pole; does not appear reliant on IV Pole for balance       Plan of Care: Patient with no further skilled PT needs in the acute care setting at this time  Discharge Recommendations: Equipment: No Equipment Needed.  See below    After initial evaluation and pt education, pt has no further skilled acute PT needs. He was able to demonstrate ambulation with no AD with no humphrey LOB with Spv. He is guarded with gait mechanics 2' to constant, persistant dizziness throughout visit. PT performed various vestibular testing including BPPV testing, H-testing, saccades, VOR testing, etc and was unable to acutely reproduce symptoms. Given pt's subjective hx of persistant, constant dizziness and inability to reproduce symptoms with vestibular testing, symptoms do not appear peripheral vestibular in nature and noted no nystagmus at any time during testing. However noted that pt has been started on meclazine which could mute/diminish symptoms as well. Pt was given education re: continued monitoring as symtpoms may resolve as optimally medically managed and encouraged pt to follow up with ENT/vestibular specialist if acute symptoms persist following d/c//subacute healing. He reports no concerns with functional ability to return to home and has no immediate skilled PT needs after dc though may follow up with outpatient vestibular PT specialist as warrented dependent on symptoms resolution. Noted 6 clicks score of 24 prior to PT visit as well.     See \"Rehab Therapy-Acute\" Patient Summary Report for complete documentation.     "

## 2018-06-01 NOTE — CARE PLAN
Problem: Safety  Goal: Will remain free from injury  Outcome: PROGRESSING AS EXPECTED  Pt educated regarding fall risk and safety, pt verbalizes understanding and calls appropriately for assistance. Bed is locked and in lowest position, safety maintained throughout shift.     Problem: Knowledge Deficit  Goal: Knowledge of disease process/condition, treatment plan, diagnostic tests, and medications will improve  Outcome: PROGRESSING AS EXPECTED  Pt and wife at bedside educated regarding plan of care, both are knowledgeable and involved in plan, verbalize understanding.

## 2018-06-01 NOTE — PROGRESS NOTES
Cardiology Dr. Beyer aware of patient's 1.3-1.8 second pauses. Please re-page if pauses become longer than 3 seconds. Monitor room also notified.

## 2018-06-01 NOTE — PROGRESS NOTES
Bedside report received. Patient A&O x4. RA. SR on the monitor. Per night shift, pt did have 1.3-1.8sec pauses overnight. Cardiology is aware. POC discussed with patient. Pt is awaiting physical therapy evaluation today. Patient verbalized understanding. Call light and belongings within reach. Bed locked and in lowest position, alarm and fall precautions in place.

## 2018-06-01 NOTE — DISCHARGE SUMMARY
Internal Medicine Discharge Summary  Note Author: Steve Mccabe M.D.       Admit Date:  5/30/2018       Discharge Date:   06/01/2018    Service:   [facility] Internal Medicine Blue Team  Attending Physician(s):   Dr. Be Urban       Senior Resident(s):   Dr. Dumont  [place] Resident(s):   Dr. Mccabe      Primary Diagnosis:     Atrial flutter (HCC) POA: yes    Secondary Diagnoses:                Active Problems:    Vertigo POA: Unknown    HTN (hypertension) POA: Yes    Alcohol use POA: Unknown  Resolved Problems:    * No resolved hospital problems. *      Hospital Summary (Brief Narrative):       Patient is a 57 year old male with no significant past medical history who presented to the ED with complaints of dizziness and nausea since 2 days. The dizziness was described as sensation of room spinning and was mostly positional in nature. He also complained of palpitations that had been going on for 6 months and heart rate of up to 220s on exertion. He admitted to drinking 4-5 drinks everyday. On arrival to the ED , he had a heart rate of 65, /113, RR 18 and saO2 99% on room air. EKG showed atrial flutter with atrial rate of 263, ventricular rate 69. TSH and electrolytes were normal.  Cardiology and EP service were consulted. Patient was admitted to telemetry and monitored. He was started on eliquis for anticoagulation. Echo showed enlarged right atrium and mild concentric LVH but was otherwise normal. The following day he underwent VIBHA and successful catheter mediated ablation of right atrial flutter. Patient tolerated the procedure well with no complications. He remained in sinus rhythm following the procedure. Per cardiology, anticoagulation to be continued for 4 weeks post ablation. He has been counseled to abstain from alcohol.     Other problems during hospital stay  Vertigo : Patient's vertigo appeared to be peripheral in origin and unrelated to artrial arrhythmia. It was most likely secondary to  BPPV vs vestibular neuritis. He was started on meclizine with improvement in symptoms. He did get an MRI brain which was negative for posterior circulation stroke. He was also evaluated by physical therapy for vestibular exercises.  Hypertension:  Patient's blood pressure was consistently >140/100. He was started on lisinopril 5 mg daily. He will need further follow up as outpatient.    Patient is hemodynamically stable and will be discharged in a good condition. He has an appointment with cardiology on 06/19            Patient /Hospital Summary (Details -- Problem Oriented) :          Vertigo   Assessment & Plan    - Physical exam findings suggestive of peripheral etiology. Most likely BPPV given positional nature. No recent URI to suggest vestibular neuronitis, although still possible.  No changes in the brainstem and cerebellum on MRI.   - Improving  - Meclizine PRN  - Patient educated on particle repositioning exercises and handout provided        * Atrial flutter (HCC)   Assessment & Plan    Likely chronic given history of palpitations since 6 months. Most likely exacerbated by alcohol use.  - Not currently in RVR  - CHADsVasc 0 -1 ( denies being diagnosed with hypertension)  - s/p successful catheter mediated ablation on 05/31.   - Echo with normal EF, normal valves, enlarged right atrium  - Per cardiology, apixaban to be continued for at least 4 weeks        HTN (hypertension)   Assessment & Plan    - Patient denies being diagnosed with hypertension, not on any medications  - MRI with chronic microvascular ischemic changes and echo with mild left ventricular hypertrophy suggesting that patient may have had hypertension for some time now.  - Continue lisinopril 5 mg.   - Needs follow up as outpatient        Alcohol use   Assessment & Plan    - Has 4-5 drinks everyday  - Last drink on Monday night. Denies previous alcohol withdrawal                Consultants:     Cardiology    Procedures:        05/31 VIBHA and  catheter mediated ablation of right atrial flutter    Imaging/ Testing:      ECHOCARDIOGRAM COMP W/O CONT   Final Result      TRANSESOPHAGEAL ECHO W/O CONT         MR-BRAIN-W/O   Final Result         1.  There are a few scattered punctate areas of of increased T2 signal intensity in the periventricular white matter consistent with areas of chronic microvascular ischemic change, demyelination, or gliosis.      DX-CHEST-PORTABLE (1 VIEW)   Final Result      No consolidation.            Discharge Medications:         Medication Reconciliation: Completed       Medication List      START taking these medications      Instructions   apixaban 5mg Tabs  Commonly known as:  ELIQUIS   Take 1 Tab by mouth 2 Times a Day.  Dose:  5 mg     lisinopril 5 MG Tabs  Start taking on:  6/2/2018  Commonly known as:  PRINIVIL   Take 1 Tab by mouth every day.  Dose:  5 mg     meclizine 25 MG Tabs  Commonly known as:  ANTIVERT   Take 1 Tab by mouth 3 times a day as needed for Dizziness or Vertigo.  Dose:  25 mg        CONTINUE taking these medications      Instructions   B Complex-B12 Tabs   Take 1 Tab by mouth every day.  Dose:  1 Tab     CO Q 10 PO   Take 1 Tab by mouth every day.  Dose:  1 Tab                Disposition:   Home    Diet:   Regular    Activity:   As tolerated    Instructions:         The patient was instructed to return to the ER in the event of worsening symptoms. I have counseled the patient on the importance of compliance and the patient has agreed to proceed with all medical recommendations and follow up plan indicated above.   The patient understands that all medications come with benefits and risks. Risks may include permanent injury or death and these risks can be minimized with close reassessment and monitoring.        Primary Care Provider:    Thomas Shepherd D.O.    Discharge summary faxed to primary care provider:  Completed  Copy of discharge summary given to the patient: Deferred      Follow up appointment  details :      Jun 07, 2018  2:20 PM PDT  Established Patient with Kellie Miller M.D.  [facility] [place] ([facility]) [address] [facility] [address]  [place] [address]  [phone]      Jun 19, 2018  4:00 PM PDT  Hospital Follow up with ELLIS Acuña  [facility] (--) [address]  [place] [address]  [phone]         Pending Studies:        None    Time spent on discharge day patient visit, preparing discharge paperwork and arranging for patient follow up.    Summary of follow up issues:   - Follow up with cardiology   - Follow up with PCP for better control of hypertension    Discharge Time (Minutes) :    60 minutes  Hospital Course Type: Observation Stay      Condition on Discharge    Stable  ______________________________________________________________________    Interval history/exam for day of discharge:     Continues to have some vertigo on head movement, but significantly improved since admission. No nausea.    Vitals:    06/01/18 0541 06/01/18 0719 06/01/18 0721 06/01/18 1103   BP: 146/104 152/104 150/96 137/95   Pulse: 71  65 65   Resp: 16  15 13   Temp: 37.2 °C (98.9 °F)  37 °C (98.6 °F) 37.1 °C (98.8 °F)   SpO2: 95%  94% 97%   Weight:       Height:         Weight/BMI: Body mass index is 26.89 kg/m².  Pulse Oximetry: 97 %, O2 (LPM): 0, O2 Delivery: None (Room Air)    Constitutional: He is oriented to person, place, and time and well-developed, well-nourished, and in no distress.   HENT:   Head: Normocephalic and atraumatic.   Eyes: EOM are normal. Pupils are equal, round, and reactive to light. No nystagmus  Neck: Normal range of motion.   Cardiovascular: Normal rate. Regular rhythm   No murmur heard.  Pulmonary/Chest: Effort normal and breath sounds normal.   Abdominal: Soft. Bowel sounds are normal.   Musculoskeletal: He exhibits no edema.   Neurological: He is alert and oriented to person, place, and time. No cranial nerve deficit.    Skin: No rash noted. No erythema.   Psychiatric: Affect and judgment normal.     Most Recent Labs:    Lab Results   Component Value Date/Time    WBC 6.1 05/30/2018 11:56 AM    WBC 5.6 08/24/2009 08:18 AM    RBC 4.91 05/30/2018 11:56 AM    RBC 4.74 08/24/2009 08:18 AM    HEMOGLOBIN 15.6 05/30/2018 11:56 AM    HEMATOCRIT 44.9 05/30/2018 11:56 AM    MCV 91.4 05/30/2018 11:56 AM    MCV 95 08/24/2009 08:18 AM    MCH 31.8 05/30/2018 11:56 AM    MCH 32.0 08/24/2009 08:18 AM    MCHC 34.7 05/30/2018 11:56 AM    MPV 10.3 05/30/2018 11:56 AM    NEUTSPOLYS 66.00 05/30/2018 11:56 AM    LYMPHOCYTES 25.50 05/30/2018 11:56 AM    MONOCYTES 6.70 05/30/2018 11:56 AM    EOSINOPHILS 1.20 05/30/2018 11:56 AM    BASOPHILS 0.30 05/30/2018 11:56 AM      Lab Results   Component Value Date/Time    SODIUM 138 06/01/2018 03:53 AM    POTASSIUM 4.0 06/01/2018 03:53 AM    CHLORIDE 105 06/01/2018 03:53 AM    CO2 26 06/01/2018 03:53 AM    GLUCOSE 98 06/01/2018 03:53 AM    BUN 10 06/01/2018 03:53 AM    CREATININE 0.93 06/01/2018 03:53 AM    CREATININE 0.96 08/24/2009 08:18 AM    BUNCREATRAT 14 08/24/2009 08:18 AM    GLOMRATE >59 08/24/2009 08:18 AM      Lab Results   Component Value Date/Time    ALTSGPT 22 06/01/2018 03:53 AM    ASTSGOT 26 06/01/2018 03:53 AM    ALKPHOSPHAT 36 06/01/2018 03:53 AM    TBILIRUBIN 0.7 06/01/2018 03:53 AM    ALBUMIN 3.4 06/01/2018 03:53 AM    GLOBULIN 2.1 06/01/2018 03:53 AM    INR 1.08 05/30/2018 11:56 AM     Lab Results   Component Value Date/Time    PROTHROMBTM 13.7 05/30/2018 11:56 AM    INR 1.08 05/30/2018 11:56 AM

## 2018-06-01 NOTE — PROGRESS NOTES
Monitor summary: SR 67-87, .20/.08/.30 No ectopy noted.     1.3-1.8 second pauses at 20:00, Dr. Beyer aware. No further pauses throughout night.

## 2018-06-01 NOTE — CARE PLAN
Problem: Communication  Goal: The ability to communicate needs accurately and effectively will improve  Outcome: PROGRESSING AS EXPECTED  Discussed POC and medications with patient. Pt educated on need for PT eval before discharge. Pt verbalized understanding.      Problem: Venous Thromboembolism (VTW)/Deep Vein Thrombosis (DVT) Prevention:  Goal: Patient will participate in Venous Thrombosis (VTE)/Deep Vein Thrombosis (DVT)Prevention Measures  Outcome: PROGRESSING AS EXPECTED  Pt is ambulatory.  Pt is also on Eliquis for VTE.

## 2018-06-01 NOTE — PROGRESS NOTES
Monitor Summary:  A-flutter 52-55  Wil down to 40bmp  1.3sp, 1.8sp x2, 2.2sp    Cardiac ablation at 1230    SR 70-72  No ectopy   .20/.08/.40

## 2018-06-01 NOTE — NON-PROVIDER
Internal Medicine Medical Student Note  Note Author: Caesar Marquez, Student    Name Edgar Jackson     1960   Age/Sex 57 y.o. male   MRN [id number]   Code Status full     After 5PM or if no immediate response to page, please call for cross-coverage  Attending/Team: Dr. Lr/Lang See Patient List for primary contact information  Call [phone] to page after hours   1st Call - Day Intern (R1):   Dr. Mccabe 2nd Call - Day Sr. Resident (R2/R3):   Dr. Dumont         Reason for interval visit  (Principal Problem)   Atrial flutter (HCC)    Interval Problem Daily Status Update  (24 hours)   No acute events overnight. Ablation of atrial flutter complete. Patient remained in sinus rhythm overnight. Vertigo symptoms unchanged from yesterday, improved from admission. Took one dose of meclizine last night. Patient able to ambulate around the room. Would like to go home as soon as he can. Neck has been hurting from the pillows and bed.    Review of Systems   Constitutional: Negative for chills and fever.   HENT: Negative for hearing loss and tinnitus.    Eyes: Negative for blurred vision.   Respiratory: Negative for shortness of breath.    Cardiovascular: Negative for chest pain and palpitations.   Gastrointestinal: Negative for abdominal pain.   Genitourinary: Negative for dysuria.   Neurological: Positive for dizziness. Negative for sensory change and headaches.               Physical Exam       Vitals:    18 0541 18 0719 18 0721 18 1103   BP: 146/104 152/104 150/96 137/95   Pulse: 71  65 65   Resp: 16  15 13   Temp: 37.2 °C (98.9 °F)  37 °C (98.6 °F) 37.1 °C (98.8 °F)   SpO2: 95%  94% 97%   Weight:       Height:         Body mass index is 26.89 kg/m². Weight: 95 kg (209 lb 7 oz)  Oxygen Therapy:  Pulse Oximetry: 97 %, O2 (LPM): 0, O2 Delivery: None (Room Air)    Physical Exam   Constitutional: He is oriented to person, place, and time and well-developed, well-nourished, and in  no distress.   HENT:   Head: Normocephalic and atraumatic.   Eyes: EOM are normal. Pupils are equal, round, and reactive to light. Right eye exhibits no nystagmus. Left eye exhibits no nystagmus.   Cardiovascular: Normal rate and regular rhythm.  Exam reveals no gallop and no friction rub.    No murmur heard.  Pulmonary/Chest: Breath sounds normal. He has no wheezes. He has no rales.   Abdominal: Bowel sounds are normal.   Musculoskeletal: He exhibits no edema.   Neurological: He is alert and oriented to person, place, and time.   Skin: Skin is warm and dry. No rash noted.             Assessment/Plan     #vertigo  -Symptoms unchanged from yesterday but much improved from admission. Likely benign paroxysmal positional vertigo. Head movements exacerbate symptoms. Patient able to ambulate without difficulty. No other neurologic deficits appreciated. MRI with few scattered punctate areas in the periventricular white matter consistent with ischemic change, demyelination, or gliosis. PT evaluated and did not see symptoms of BPPV. However, patient had received meclizine and has had improving symptoms over the past few days which may have affected testing.     Plan:  -discharge today  -meclizine PRN for vertigo  -educated patient on Epley maneuver which he can perform at home  -patient to return should symptoms not continue to improve     #atrial flutter  -Likely chronic issue. Patient has been experiencing palpitations for 6 months as well as tachycardia to 220s during exercise. Alcohol is most likely the cause of his atrial flutter given that patient drinks 4 alcoholic beverages per day. Cardiology signed off. CHADS-VASc score 0-1 (questionable history of HTN). TSH 1.59. Ablation complete.     Plan:  -continue apixaban 5mg for at least 4 weeks per cardiology  -follow-up with cardiology in 2 weeks     #alcohol use  -Patient reports drinking 4 alcoholic beverages per day. Has never experienced withdrawal. Last drink was  Monday evening. No signs of withdrawal.     Plan:  -CIWA not necessary as patient showing no signs of withdrawal, past 48 hours     #HTN  -BP 120s-160s in hospital. Has not had prior diagnosis of HTN.      Plan:  -lisinopril 5mg for BP reduction  -f/u outpatient with cardiology in 2 weeks

## 2018-06-02 NOTE — DISCHARGE PLANNING
Medical Social Worker    FORREST was informed by bedside RN that pt had been DC'd under the assumption that his meds had been sent to his [facility]. His meds had actually been sent to the [facility]. Pt is not able to  his Eliquis until tomorrow now as they have closed for the day. Pt needs his dose of Eliquis tonight. This FORREST called RUIZ Croft who was pt's attending MD. FORREST requested that they call one dose of Eliquis to pt's [facility] ([phone]). They stated they would. FORREST called this pt and explained that he can  one dose of Eliquis for tonight at his [facility] and then go to the [facility] tomorrow to  the rest. Pt stated that he understands and thanked this FORREST.

## 2018-06-04 ENCOUNTER — PATIENT OUTREACH (OUTPATIENT)
Dept: HEALTH INFORMATION MANAGEMENT | Facility: OTHER | Age: 58
End: 2018-06-04

## 2018-06-07 ENCOUNTER — OFFICE VISIT (OUTPATIENT)
Dept: MEDICAL GROUP | Facility: CLINIC | Age: 58
End: 2018-06-07
Payer: COMMERCIAL

## 2018-06-07 VITALS
OXYGEN SATURATION: 97 % | BODY MASS INDEX: 26.31 KG/M2 | RESPIRATION RATE: 14 BRPM | TEMPERATURE: 98.4 F | DIASTOLIC BLOOD PRESSURE: 82 MMHG | SYSTOLIC BLOOD PRESSURE: 140 MMHG | HEIGHT: 74 IN | WEIGHT: 205 LBS | HEART RATE: 78 BPM

## 2018-06-07 DIAGNOSIS — Z78.9 ALCOHOL USE: ICD-10-CM

## 2018-06-07 DIAGNOSIS — Z09 HOSPITAL DISCHARGE FOLLOW-UP: ICD-10-CM

## 2018-06-07 DIAGNOSIS — I48.92 ATRIAL FLUTTER, UNSPECIFIED TYPE (HCC): ICD-10-CM

## 2018-06-07 DIAGNOSIS — R42 VERTIGO: ICD-10-CM

## 2018-06-07 DIAGNOSIS — Z86.79 S/P ABLATION OF ATRIAL FLUTTER: ICD-10-CM

## 2018-06-07 DIAGNOSIS — I10 ESSENTIAL HYPERTENSION: ICD-10-CM

## 2018-06-07 DIAGNOSIS — Z98.890 S/P ABLATION OF ATRIAL FLUTTER: ICD-10-CM

## 2018-06-07 PROCEDURE — 99204 OFFICE O/P NEW MOD 45 MIN: CPT | Performed by: FAMILY MEDICINE

## 2018-06-07 NOTE — PATIENT INSTRUCTIONS
If you need further assistance, or have any questions; concerns or lingering symptoms before seeing your Primary Care Provider or specialist.     Do not hesitate to contact us.     Please contact us at the Post-Hospital Follow Up Program at [phone].   Our offices hours are Monday-Friday 8 am-5 pm.

## 2018-06-07 NOTE — PROGRESS NOTES
Subjective:     Edgar Jackson is a 57 y.o. male who presents for Hospital Follow-up.  Chart and discharge summary reviewed.   Date of discharge 6/1/18.  48- hour post discharge RN call completed on 6/4/18 and documented in the medical record by Valencia Cabrallo RN:  POST DISCHARGE CALL:  Discharge Date:6/1/2018   Date of Outreach Call: 6/4/2018  3:07 PM  Now that you're home, how are you doing? Good  Comment:Patient reports feeling better. States  occasional dizziness at times. Pt is monitoring blood  pressure. States readings have been normal.  Do you have questions about your medications? No    Did you fill your medications? No  Comment:Patient states he didn't get lisinopril or  antivert. States he is taking Eliquis. Pt states he doesn't  believe he needs B/P medication    Do you have a follow-up appointment scheduled?Yes    Discharging Department: Telemetry 8    Number of Attempts: 1  Current or previous attempts completed within two business days of discharge? Yes  Provided education regarding treatment plan, medication, self-management, ADLs? Yes  Has patient completed Advance Directive? If yes, advise them to bring to appointment. No  Care Manager phone number provided? Yes  Is there anything else I can help you with? No        HPI: Recently hospitalized for atrial flutter, hypertension, and dizziness.  He underwent successful ablation of the atrial flutter and is maintained on Eliquis for 30 days.  He was also started on lisinopril 5 mg daily for hypertension.  His dizziness was determined to be due to positional vertigo and he was given meclizine and some exercises in the hospital.  He says he took meclizine once with not much effect.  He says the exercises do not seem to be helping.  Although his vertigo is getting a little better every day he would like to consult with an ENT specialist.  He says he especially feels that when he is moving around and turning his head.    Since returning home,  "patient reports feeling good.  He is back to working out and now his heart rate is under control. He was previously drinking 4-5 alcohol drinks at night and he says he has discontinued that practice.    The patient has questions regarding hospitalization or discharge: All questions were answered  The patient denies weakness; denies difficulty taking care of self at home.  Patient reports not taking medications as instructed.  He did not fill the prescription for lisinopril.      Allergies:   Nkda [no known drug allergy]    Social History:  Social History   Substance Use Topics   • Smoking status: Never Smoker   • Smokeless tobacco: Never Used   • Alcohol use Yes      Comment: 4-5 drinks everyday        ROS:    Constitutional:  Denies fever, chills, night sweats, fatigue or malaise  HENT: Denies head congestion, ear pain or drainage, decreased hearing, sore throat  Eyes: Denies visual changes, eye drainage or redness, eye pain  Neck: Denies pain, swollen glands, decreased range of motion  Lungs: Denies shortness of breath, wheezing, cough  Cardiovascular: Denies chest pain, orthopnea, lower extremity edema, palpitations  Abdominal: Denies abdominal pain, change in bowel or bladder habits, nausea or vomiting  Musculoskeletal: Denies back pain, joint pains, decreased range of motion  Endocrine: Denies unexplained weight changes, heat or cold intolerance, hair loss, polyuria or polydipsia  Neurological: Denies headache, confusion, focal weakness or numbness, memory loss  Psychiatric: Denies depression, anxiety, insomnia       Objective:     Blood pressure 140/82, pulse 78, temperature 36.9 °C (98.4 °F), resp. rate 14, height 1.88 m (6' 2\"), weight 93 kg (205 lb), SpO2 97 %.     Physical Exam:    GEN:  Alert, oriented, in no distress  HEENT:  PERRLA, EOMI  LUNGS:  Clear to auscultation without rales, rhonci, or wheezes.  CV:  Heart RRR without murmurs or S3 or S4  EXT:  Without cyanosis, clubbing, or edema.  NEURO:  " Cranial nerves II through XII intact.  Motor function and sensation intact.  SKIN: No rashes or suspicious lesions.  PSYCH:  Behavior is appropriate.      Assessment and Plan:     1. Hospital follow-up:   Hospitalization and results reviewed with patient. High risk conditions requiring teaching or care coordination were identified and addressed.The patient demonstrate understanding of admission and underlying conditions. The patient understands discharge instructions and when to seek medical attention. Medications reviewed including instructions regarding high risk medications, dosing and side effects.    The patient is able to safely adhere to ADL/IADL, treatment and medication regimen, self-manage of high-risk conditions? Yes   The patient requires physical therapy/home health/DME referral? No   The patient requires referral to care coordination/behavioral health/social work?  No   Patient requires referral for pharmacy consult? No   Advance directive/POLST on file?  No   Required counseling on advance directive?  Deferred    2. Atrial flutter, unspecified type (HCC)  -Resolved.  He is continuing the Eliquis    3. S/P ablation of atrial flutter  -Cardiology follow-up as scheduled    4. Essential hypertension  -I counseled him regarding the importance of taking the lisinopril.  He says he will fill the prescription.    5. Vertigo    - REFERRAL TO ENT    6. Alcohol use    -Continue abstention or minimal alcohol intake.        Medication Reconciliation  Medication list at end of encounter:   Current Outpatient Prescriptions   Medication Sig Dispense Refill   • meclizine (ANTIVERT) 25 MG Tab Take 1 Tab by mouth 3 times a day as needed for Dizziness or Vertigo. 30 Tab 0   • apixaban (ELIQUIS) 5mg Tab Take 1 Tab by mouth 2 Times a Day. 60 Tab 0   • lisinopril (PRINIVIL) 5 MG Tab Take 1 Tab by mouth every day. 30 Tab 1   • Coenzyme Q10 (CO Q 10 PO) Take 1 Tab by mouth every day.     • B Complex Vitamins (B COMPLEX-B12)  Tab Take 1 Tab by mouth every day.       No current facility-administered medications for this visit.        Primary care follow-up:    Recommended followup:  with new PCP  Future Appointments       Provider Department Center    6/19/2018 4:00 PM ELLIS Acuña [facility]     7/16/2018 7:00 AM ELLIS Zarate [facility] [address]          Patient Instruction  Patient provided with educational material on discharge diagnosis and management of symptoms/red flags. Patient instructed to keep follow-up appointments and to bring written questions and and actual medications to each office visit. Patient instructed to call PCP/specialist with any problems/questions/concerns. Patient verbalizes understanding and has no further questions at this time.    Total of 45 minutes face-to-face time was spent with patient, with greater than 50% of the time spent in counseling and coordination of care.

## 2018-06-19 ENCOUNTER — OFFICE VISIT (OUTPATIENT)
Dept: CARDIOLOGY | Facility: MEDICAL CENTER | Age: 58
End: 2018-06-19
Payer: COMMERCIAL

## 2018-06-19 VITALS
DIASTOLIC BLOOD PRESSURE: 86 MMHG | OXYGEN SATURATION: 95 % | BODY MASS INDEX: 26.05 KG/M2 | WEIGHT: 203 LBS | HEIGHT: 74 IN | SYSTOLIC BLOOD PRESSURE: 132 MMHG | HEART RATE: 62 BPM | RESPIRATION RATE: 14 BRPM

## 2018-06-19 DIAGNOSIS — I10 ESSENTIAL HYPERTENSION: ICD-10-CM

## 2018-06-19 DIAGNOSIS — Z79.01 CHRONIC ANTICOAGULATION: ICD-10-CM

## 2018-06-19 DIAGNOSIS — Z98.890 H/O CARDIAC RADIOFREQUENCY ABLATION: ICD-10-CM

## 2018-06-19 DIAGNOSIS — I48.92 ATRIAL FLUTTER, UNSPECIFIED TYPE (HCC): ICD-10-CM

## 2018-06-19 LAB — EKG IMPRESSION: NORMAL

## 2018-06-19 PROCEDURE — 93000 ELECTROCARDIOGRAM COMPLETE: CPT | Performed by: NURSE PRACTITIONER

## 2018-06-19 PROCEDURE — 99213 OFFICE O/P EST LOW 20 MIN: CPT | Performed by: NURSE PRACTITIONER

## 2018-06-19 ASSESSMENT — ENCOUNTER SYMPTOMS
ORTHOPNEA: 0
SORE THROAT: 0
SPEECH CHANGE: 0
PALPITATIONS: 0
ABDOMINAL PAIN: 0
SHORTNESS OF BREATH: 0
LOSS OF CONSCIOUSNESS: 0
SPUTUM PRODUCTION: 0
HEADACHES: 0
BLOOD IN STOOL: 0
STRIDOR: 0
SENSORY CHANGE: 0
DIARRHEA: 0
DIZZINESS: 0
FEVER: 0
TINGLING: 0
PND: 0
WEIGHT LOSS: 0
CHILLS: 0
VOMITING: 0
NAUSEA: 0
TREMORS: 0
COUGH: 0
WHEEZING: 0
HEMOPTYSIS: 0
HEARTBURN: 0
FOCAL WEAKNESS: 0
DOUBLE VISION: 0
BLURRED VISION: 0

## 2018-06-19 NOTE — LETTER
[facility]   [address], [address]  [place] [address]  Phone: [phone]  Fax: [phone]              Edgar Jackson  1960    Encounter Date: 2018    ELLIS Acuña          PROGRESS NOTE:  Cardiology/Electrophysiology Follow-up Note      Subjective:   Chief Complaint:   Chief Complaint   Patient presents with    Atrial Flutter     Hospital follow up for post ablation for atrial flutter.       Edgar Jackson is a 57 y.o. male who presents today for follow up post atrial flutter ablation 18.    Patient was came to [facility] with complaints of nausea and dizziness x 2 days.  EKG in the ER revealed atrial flutter with a controlled ventricular rate.  Patient had reported noticing elevations to his heart rate with workouts prior to admission.  TSH was checked and found to be normal.      Procedural conclusions per Dr. Benedict's Procedure Note:  Arrhythmias:  1. Sustained typical atrial flutter,  ms with 2:1 AV conduction.   Mapping:  Electroanatomical 3D (CARTO FAM) map of CS and right atrium around the cavotricuspid isthmus was created during CS pacing.  Ablation:  Radiofrequency energy was applied using 3.5 mmsaline irrigated catheter ([employer] ST SF), power 0 W, total 8 RF applications, RF time 597 seconds to create a cavotricuspid isthmus line between the tricuspid annulus and Eustachian ridge/inferior vena cava to produce bidirectional isthmus conduction block and termination of flutter.  Post Ablation Testin. No inducible atrial flutter with CS pacing post ablation.  2. Trans-isthmus conduction time pacing from proximal  msec.  3. Trans-isthmus conduction time pacing from lateral to the isthmus line 160 msec.  4. Rhythm at end of procedure is sinus bradycardia CL 1573 msec.  Impressions/Plan:   1. Typical right atrial flutter.  2. Successful catheter mediated ablation of right atrial flutter.  3. Admit to monitored  bed overnight, continue OAC x at least 4 weeks.    Today in follow up he denies chest pain, palpitations, pre syncope or syncope, dyspnea, PND, orthopnea, or lower extremity edema.  He states that he continues to feel some brief dizziness occasionally, but that it has substantially improved since discharge from the hospital.  He otherwise states that he feels great.  He is back to his physical activities and tolerating them well.  He monitors his heart rate at home, no rates below 50 and no elevated rates unless he is pushing himself with his workout.       He has not started lisinopril yet as he states that his blood pressure via his home cuff is 115-130 systolic and DBP usually in the 70s, occasionally low 80s.       Family History   Problem Relation Age of Onset   • Heart Disease Father      Social History     Social History   • Marital status:      Spouse name: N/A   • Number of children: N/A   • Years of education: N/A     Occupational History   • Not on file.     Social History Main Topics   • Smoking status: Never Smoker   • Smokeless tobacco: Never Used   • Alcohol use Yes      Comment: 4-5 drinks everyday   • Drug use: No   • Sexual activity: Yes     Partners: Female     Other Topics Concern   • Not on file     Social History Narrative   • No narrative on file     Allergies   Allergen Reactions   • Nkda [No Known Drug Allergy]        Current Outpatient Prescriptions   Medication Sig Dispense Refill   • meclizine (ANTIVERT) 25 MG Tab Take 1 Tab by mouth 3 times a day as needed for Dizziness or Vertigo. 30 Tab 0   • apixaban (ELIQUIS) 5mg Tab Take 1 Tab by mouth 2 Times a Day. 60 Tab 0   • lisinopril (PRINIVIL) 5 MG Tab Take 1 Tab by mouth every day. 30 Tab 1   • Coenzyme Q10 (CO Q 10 PO) Take 1 Tab by mouth every day.     • B Complex Vitamins (B COMPLEX-B12) Tab Take 1 Tab by mouth every day.       No current facility-administered medications for this visit.        Review of Systems   Constitutional:  "Negative for chills, fever, malaise/fatigue and weight loss.   HENT: Negative for congestion, nosebleeds, sore throat and tinnitus.    Eyes: Negative for blurred vision and double vision.   Respiratory: Negative for cough, hemoptysis, sputum production, shortness of breath, wheezing and stridor.    Cardiovascular: Negative for chest pain, palpitations, orthopnea, leg swelling and PND.   Gastrointestinal: Negative for abdominal pain, blood in stool, diarrhea, heartburn, melena, nausea and vomiting.   Genitourinary: Negative for hematuria.   Skin: Negative for rash.   Neurological: Negative for dizziness, tingling, tremors, sensory change, speech change, focal weakness, loss of consciousness and headaches.     All others systems reviewed and negative.     Objective:     Blood pressure 132/86, pulse 62, resp. rate 14, height 1.88 m (6' 2\"), weight 92.1 kg (203 lb), SpO2 95 %. Body mass index is 26.06 kg/m².    Physical Exam   Constitutional: He is oriented to person, place, and time and well-developed, well-nourished, and in no distress.   HENT:   Head: Normocephalic and atraumatic.   Eyes: Conjunctivae and EOM are normal. Pupils are equal, round, and reactive to light.   Neck: Normal range of motion. Neck supple. No JVD present. No tracheal deviation present.   Cardiovascular: Normal rate, regular rhythm, normal heart sounds and intact distal pulses.  Exam reveals no gallop and no friction rub.    No murmur heard.  Pulses:       Radial pulses are 2+ on the right side, and 2+ on the left side.        Dorsalis pedis pulses are 2+ on the right side, and 2+ on the left side.        Posterior tibial pulses are 2+ on the right side, and 2+ on the left side.   Pulmonary/Chest: Effort normal and breath sounds normal. No respiratory distress. He has no wheezes. He has no rales. He exhibits no tenderness.   Abdominal: Soft. Bowel sounds are normal.   Musculoskeletal: Normal range of motion. He exhibits no edema.   Neurological: " He is alert and oriented to person, place, and time.   Skin: Skin is warm and dry.   Psychiatric: Mood, memory, affect and judgment normal.         Cardiac Imaging and Procedures Review:    EKG dated 6/19/18:   Sinus Bradycardia.  Rate 51.     Echo dated 5/31/18:   Left Ventricle  Normal left ventricular chamber size. Mild concentric left ventricular   hypertrophy. Normal left ventricular systolic function. Left   ventricular ejection fraction is visually estimated to be 60%. Normal   regional wall motion. Diastolic function is difficult to assess.  Right Ventricle  The right ventricle was normal in size and function.  Right Atrium  Enlarged right atrium. Dilated inferior vena cava without inspiratory   collapse.  Left Atrium  The left atrium is normal in size. Left atrial volume index is 18 mL/sq   m.  Mitral Valve  Structurally normal mitral valve without significant stenosis or   regurgitation.  Aortic Valve  Aortic sclerosis without stenosis. No aortic stenosis. No aortic   insufficiency.  Tricuspid Valve  No tricuspid stenosis. Trace tricuspid regurgitation. Estimated right   ventricular systolic pressure  is 35 mmHg.  Pulmonic Valve  Structurally normal pulmonic valve without significant stenosis or   regurgitation.  Pericardium  Normal pericardium without effusion.    Labs (personally reviewed and notable for):   Lab Results   Component Value Date/Time    SODIUM 138 06/01/2018 03:53 AM    POTASSIUM 4.0 06/01/2018 03:53 AM    CHLORIDE 105 06/01/2018 03:53 AM    CO2 26 06/01/2018 03:53 AM    GLUCOSE 98 06/01/2018 03:53 AM    BUN 10 06/01/2018 03:53 AM    CREATININE 0.93 06/01/2018 03:53 AM      Lab Results   Component Value Date/Time    WBC 6.1 05/30/2018 11:56 AM    RBC 4.91 05/30/2018 11:56 AM    HEMOGLOBIN 15.6 05/30/2018 11:56 AM    HEMATOCRIT 44.9 05/30/2018 11:56 AM    MCV 91.4 05/30/2018 11:56 AM    MCH 31.8 05/30/2018 11:56 AM    MCHC 34.7 05/30/2018 11:56 AM    MPV 10.3 05/30/2018 11:56 AM     NEUTSPOLYS 66.00 05/30/2018 11:56 AM    LYMPHOCYTES 25.50 05/30/2018 11:56 AM    MONOCYTES 6.70 05/30/2018 11:56 AM    EOSINOPHILS 1.20 05/30/2018 11:56 AM    BASOPHILS 0.30 05/30/2018 11:56 AM      PT/INR:   Lab Results   Component Value Date/Time    PROTHROMBTM 13.7 05/30/2018 11:56 AM    INR 1.08 05/30/2018 11:56 AM       Assessment:     1. Atrial flutter, unspecified type (HCC)  EKG   2. H/O cardiac radiofrequency ablation     3. Chronic anticoagulation     4. Essential hypertension         Medical Decision Making:  Today's Assessment / Status / Plan:   1. Typical Atrial Flutter S/P Ablation:  - Maintaining sinus post ablation.    - Reports feeling well.  EPS catheter sites uncomplicated.  - Continue Xarelto for one month post procedure.  Can stop after that if no further arrhythmias.     2. Anticoagulation:  - Continue as above.  - Denies signs of internal bleeding.    3. HTN:  - DBP slightly elevated today.  - His BP was elevated while in the hospital.  He reports BP at home 'normal' (115-low 130s/70s mostly).  No log to review today.   - Reviewed [facility] BP guidelines with the patient. Encouraged him to fill his lisinopril.    He will be cleared for dental work starting July 31st.        Plan reviewed in detail with the patient and he verbalizes understanding and is in agreement.   RTC in 2 months for review, sooner if needed.  Collaborating MD/ADD: [email or web address]  [address] [id number]  VIA In Basket

## 2018-06-19 NOTE — PROGRESS NOTES
Cardiology/Electrophysiology Follow-up Note      Subjective:   Chief Complaint:   Chief Complaint   Patient presents with   • Atrial Flutter     Hospital follow up for post ablation for atrial flutter.       Edgar Jackson is a 57 y.o. male who presents today for follow up post atrial flutter ablation 18.    Patient was came to [facility] with complaints of nausea and dizziness x 2 days.  EKG in the ER revealed atrial flutter with a controlled ventricular rate.  Patient had reported noticing elevations to his heart rate with workouts prior to admission.  TSH was checked and found to be normal.      Procedural conclusions per Dr. Benedict's Procedure Note:  Arrhythmias:  1. Sustained typical atrial flutter,  ms with 2:1 AV conduction.   Mapping:  Electroanatomical 3D (CARTO FAM) map of CS and right atrium around the cavotricuspid isthmus was created during CS pacing.  Ablation:  Radiofrequency energy was applied using 3.5 mmsaline irrigated catheter ([employer] ST SF), power 0 W, total 8 RF applications, RF time 597 seconds to create a cavotricuspid isthmus line between the tricuspid annulus and Eustachian ridge/inferior vena cava to produce bidirectional isthmus conduction block and termination of flutter.  Post Ablation Testin. No inducible atrial flutter with CS pacing post ablation.  2. Trans-isthmus conduction time pacing from proximal  msec.  3. Trans-isthmus conduction time pacing from lateral to the isthmus line 160 msec.  4. Rhythm at end of procedure is sinus bradycardia CL 1573 msec.  Impressions/Plan:   1. Typical right atrial flutter.  2. Successful catheter mediated ablation of right atrial flutter.  3. Admit to monitored bed overnight, continue OAC x at least 4 weeks.    Today in follow up he denies chest pain, palpitations, pre syncope or syncope, dyspnea, PND, orthopnea, or lower extremity edema.  He states that he continues to feel some brief dizziness occasionally, but that it has  substantially improved since discharge from the hospital.  He otherwise states that he feels great.  He is back to his physical activities and tolerating them well.  He monitors his heart rate at home, no rates below 50 and no elevated rates unless he is pushing himself with his workout.       He has not started lisinopril yet as he states that his blood pressure via his home cuff is 115-130 systolic and DBP usually in the 70s, occasionally low 80s.       Family History   Problem Relation Age of Onset   • Heart Disease Father      Social History     Social History   • Marital status:      Spouse name: N/A   • Number of children: N/A   • Years of education: N/A     Occupational History   • Not on file.     Social History Main Topics   • Smoking status: Never Smoker   • Smokeless tobacco: Never Used   • Alcohol use Yes      Comment: 4-5 drinks everyday   • Drug use: No   • Sexual activity: Yes     Partners: Female     Other Topics Concern   • Not on file     Social History Narrative   • No narrative on file     Allergies   Allergen Reactions   • Nkda [No Known Drug Allergy]        Current Outpatient Prescriptions   Medication Sig Dispense Refill   • meclizine (ANTIVERT) 25 MG Tab Take 1 Tab by mouth 3 times a day as needed for Dizziness or Vertigo. 30 Tab 0   • apixaban (ELIQUIS) 5mg Tab Take 1 Tab by mouth 2 Times a Day. 60 Tab 0   • lisinopril (PRINIVIL) 5 MG Tab Take 1 Tab by mouth every day. 30 Tab 1   • Coenzyme Q10 (CO Q 10 PO) Take 1 Tab by mouth every day.     • B Complex Vitamins (B COMPLEX-B12) Tab Take 1 Tab by mouth every day.       No current facility-administered medications for this visit.        Review of Systems   Constitutional: Negative for chills, fever, malaise/fatigue and weight loss.   HENT: Negative for congestion, nosebleeds, sore throat and tinnitus.    Eyes: Negative for blurred vision and double vision.   Respiratory: Negative for cough, hemoptysis, sputum production, shortness of  "breath, wheezing and stridor.    Cardiovascular: Negative for chest pain, palpitations, orthopnea, leg swelling and PND.   Gastrointestinal: Negative for abdominal pain, blood in stool, diarrhea, heartburn, melena, nausea and vomiting.   Genitourinary: Negative for hematuria.   Skin: Negative for rash.   Neurological: Negative for dizziness, tingling, tremors, sensory change, speech change, focal weakness, loss of consciousness and headaches.     All others systems reviewed and negative.     Objective:     Blood pressure 132/86, pulse 62, resp. rate 14, height 1.88 m (6' 2\"), weight 92.1 kg (203 lb), SpO2 95 %. Body mass index is 26.06 kg/m².    Physical Exam   Constitutional: He is oriented to person, place, and time and well-developed, well-nourished, and in no distress.   HENT:   Head: Normocephalic and atraumatic.   Eyes: Conjunctivae and EOM are normal. Pupils are equal, round, and reactive to light.   Neck: Normal range of motion. Neck supple. No JVD present. No tracheal deviation present.   Cardiovascular: Normal rate, regular rhythm, normal heart sounds and intact distal pulses.  Exam reveals no gallop and no friction rub.    No murmur heard.  Pulses:       Radial pulses are 2+ on the right side, and 2+ on the left side.        Dorsalis pedis pulses are 2+ on the right side, and 2+ on the left side.        Posterior tibial pulses are 2+ on the right side, and 2+ on the left side.   Pulmonary/Chest: Effort normal and breath sounds normal. No respiratory distress. He has no wheezes. He has no rales. He exhibits no tenderness.   Abdominal: Soft. Bowel sounds are normal.   Musculoskeletal: Normal range of motion. He exhibits no edema.   Neurological: He is alert and oriented to person, place, and time.   Skin: Skin is warm and dry.   Psychiatric: Mood, memory, affect and judgment normal.         Cardiac Imaging and Procedures Review:    EKG dated 6/19/18:   Sinus Bradycardia.  Rate 51.     Echo dated 5/31/18: "   Left Ventricle  Normal left ventricular chamber size. Mild concentric left ventricular   hypertrophy. Normal left ventricular systolic function. Left   ventricular ejection fraction is visually estimated to be 60%. Normal   regional wall motion. Diastolic function is difficult to assess.  Right Ventricle  The right ventricle was normal in size and function.  Right Atrium  Enlarged right atrium. Dilated inferior vena cava without inspiratory   collapse.  Left Atrium  The left atrium is normal in size. Left atrial volume index is 18 mL/sq   m.  Mitral Valve  Structurally normal mitral valve without significant stenosis or   regurgitation.  Aortic Valve  Aortic sclerosis without stenosis. No aortic stenosis. No aortic   insufficiency.  Tricuspid Valve  No tricuspid stenosis. Trace tricuspid regurgitation. Estimated right   ventricular systolic pressure  is 35 mmHg.  Pulmonic Valve  Structurally normal pulmonic valve without significant stenosis or   regurgitation.  Pericardium  Normal pericardium without effusion.    Labs (personally reviewed and notable for):   Lab Results   Component Value Date/Time    SODIUM 138 06/01/2018 03:53 AM    POTASSIUM 4.0 06/01/2018 03:53 AM    CHLORIDE 105 06/01/2018 03:53 AM    CO2 26 06/01/2018 03:53 AM    GLUCOSE 98 06/01/2018 03:53 AM    BUN 10 06/01/2018 03:53 AM    CREATININE 0.93 06/01/2018 03:53 AM      Lab Results   Component Value Date/Time    WBC 6.1 05/30/2018 11:56 AM    RBC 4.91 05/30/2018 11:56 AM    HEMOGLOBIN 15.6 05/30/2018 11:56 AM    HEMATOCRIT 44.9 05/30/2018 11:56 AM    MCV 91.4 05/30/2018 11:56 AM    MCH 31.8 05/30/2018 11:56 AM    MCHC 34.7 05/30/2018 11:56 AM    MPV 10.3 05/30/2018 11:56 AM    NEUTSPOLYS 66.00 05/30/2018 11:56 AM    LYMPHOCYTES 25.50 05/30/2018 11:56 AM    MONOCYTES 6.70 05/30/2018 11:56 AM    EOSINOPHILS 1.20 05/30/2018 11:56 AM    BASOPHILS 0.30 05/30/2018 11:56 AM      PT/INR:   Lab Results   Component Value Date/Time    PROTHROMBTM 13.7  05/30/2018 11:56 AM    INR 1.08 05/30/2018 11:56 AM       Assessment:     1. Atrial flutter, unspecified type (HCC)  EKG   2. H/O cardiac radiofrequency ablation     3. Chronic anticoagulation     4. Essential hypertension         Medical Decision Making:  Today's Assessment / Status / Plan:   1. Typical Atrial Flutter S/P Ablation:  - Maintaining sinus post ablation.    - Reports feeling well.  EPS catheter sites uncomplicated.  - Continue Xarelto for one month post procedure.  Can stop after that if no further arrhythmias.     2. Anticoagulation:  - Continue as above.  - Denies signs of internal bleeding.    3. HTN:  - DBP slightly elevated today.  - His BP was elevated while in the hospital.  He reports BP at home 'normal' (115-low 130s/70s mostly).  No log to review today.   - Reviewed [facility] BP guidelines with the patient. Encouraged him to fill his lisinopril.    He will be cleared for dental work starting July 31st.        Plan reviewed in detail with the patient and he verbalizes understanding and is in agreement.   RTC in 2 months for review, sooner if needed.  Collaborating MD/ADD: GAMAL Crews.

## 2018-07-16 ENCOUNTER — OFFICE VISIT (OUTPATIENT)
Dept: MEDICAL GROUP | Facility: MEDICAL CENTER | Age: 58
End: 2018-07-16
Payer: COMMERCIAL

## 2018-07-16 VITALS
BODY MASS INDEX: 26.75 KG/M2 | DIASTOLIC BLOOD PRESSURE: 74 MMHG | TEMPERATURE: 97.5 F | HEART RATE: 61 BPM | OXYGEN SATURATION: 100 % | SYSTOLIC BLOOD PRESSURE: 126 MMHG | WEIGHT: 208.4 LBS | RESPIRATION RATE: 14 BRPM | HEIGHT: 74 IN

## 2018-07-16 DIAGNOSIS — I48.92 ATRIAL FLUTTER, UNSPECIFIED TYPE (HCC): ICD-10-CM

## 2018-07-16 DIAGNOSIS — I10 ESSENTIAL HYPERTENSION: ICD-10-CM

## 2018-07-16 DIAGNOSIS — M51.36 DDD (DEGENERATIVE DISC DISEASE), LUMBAR: ICD-10-CM

## 2018-07-16 DIAGNOSIS — M47.22 OSTEOARTHRITIS OF SPINE WITH RADICULOPATHY, CERVICAL REGION: ICD-10-CM

## 2018-07-16 DIAGNOSIS — M48.02 CERVICAL STENOSIS OF SPINAL CANAL: ICD-10-CM

## 2018-07-16 PROBLEM — M51.369 DDD (DEGENERATIVE DISC DISEASE), LUMBAR: Status: ACTIVE | Noted: 2018-07-16

## 2018-07-16 PROCEDURE — 99213 OFFICE O/P EST LOW 20 MIN: CPT | Performed by: NURSE PRACTITIONER

## 2018-07-16 NOTE — PROGRESS NOTES
CC: establish care      Edgar Jackson is a 57 y.o. male here to establish care and to discuss the evaluation and management of:    There are no diagnoses linked to this encounter.    Cardiac Ablation  Patient states that he was recently in the hospital for irregular heartbeat and dizziness.  States that he had an ablation for his atrial flutter.  States he completed his Eliquis for  the 4-week treatment.  Patient also makes note that he is only drinking 1 maybe 2 beers or wine a night now.  Ad had heart palpitations and flutters and dizzy and vertigo for about 6 months. States had cardiac about 5 weeks ago. Stopped taking Eloquis after 4 weeks as directed. States he stopped taking the lisinopril because he thinks he does not need it. Takes blood pressure occasional and it runs good. Now he follows up with cardiology. Denies dizziness at this time.    Neck pain  On the left side for several years.  States that several years ago he did have an MRI done on his neck and it shows some foraminal stenosis and some degeneration.  Patient states that he feels like his left arm is little bit weaker than his right arm and he does get tingling in that arm.  States he would like a referral to pain clinic.      Back pain  Patient states he does suffer from chronic back pain.  Patient states that he has had an MRI in the past that shows some degenerative disc disease.  Denies any loss of bowel or bladder function, numbness or tingling in his feet or foot drop.  States he does exercise almost on a regular basis.  Patient states he does go to a chiropractor to get adjusted.    ROS:  Denies any Headache, Blurred Vision, Confusion Chest pain,  Shortness of breath,  Abdominal pain, Changes of bowel or bladder, Lower ext edema, Fevers, Nights sweats, Weight Changes, Focal weakness or numbness.  All other systems are negative.tingling in left arm, neck pain, left arm weakness    No current outpatient prescriptions on  "file.    Allergies   Allergen Reactions   • Nkda [No Known Drug Allergy]        Past Medical History:   Diagnosis Date   • Back pain    • Neck pain      Past Surgical History:   Procedure Laterality Date   • OTHER  06/2018    cardiac ablation   • CERVICAL DISK AND FUSION ANTERIOR  2009     Family History   Problem Relation Age of Onset   • Heart Disease Father      Social History     Social History   • Marital status:      Spouse name: N/A   • Number of children: N/A   • Years of education: N/A     Occupational History   • Not on file.     Social History Main Topics   • Smoking status: Never Smoker   • Smokeless tobacco: Never Used   • Alcohol use Yes      Comment: 1-2 drinks   • Drug use: No   • Sexual activity: Yes     Partners: Female     Other Topics Concern   • Not on file     Social History Narrative   • No narrative on file       Objective:     Vitals: /74   Pulse 61   Temp 36.4 °C (97.5 °F)   Resp 14   Ht 1.88 m (6' 2\")   Wt 94.5 kg (208 lb 6.4 oz)   SpO2 100%   BMI 26.76 kg/m²      General: Alert, pleasant, NAD  HEENT:  Normocephalic.    Heart:  Regular rate and rhythm.  S1 and S2 normal.  No murmurs appreciated.  Respiratory:  Normal respiratory effort.  Clear to auscultation bilaterally.    Skin:  Warm, dry, no rashes  Musculoskeletal:  Gait is normal.  Moves all extremities well.  Extremities:  . No leg edema.Reports tingling in left arm  Neurological: No tremors, sensation grossly intact  Psych:  Affect/mood is normal, judgement is good, memory is intact, grooming is appropriate.      Assessment and Plan.   57 y.o. male      Edgar was seen today for establish care.      Atrial flutter, unspecified type (HCC)  Controlled s/p ablation.  HR regular. States he feels like a new man.  Denies any heart palpitations, flutters, shortness of breath or dizziness at this time.  Following up with cardiology in a few months.  Has stopped taking the Eliquis and is not taking his lisinopril per " his choice.    Essential hypertension  Blood pressure in clinic today is 126/74.  Patient states he does not think he needs any blood pressure medications, he checks at home. Denies dizziness, chest pain, leg swelling or palpitations. Has stopped taking the lisinopril.    Cervical stenosis of spinal canal  Osteoarthritis of spine with radiculopathy, cervical region  Chronic.  Manages with exercise and stretching.  Status post anterior discectomy and fusion.  Declines referral to physical therapy at this time.  Referral to pain management.    Last MRI 2009  1.POSTOPERATIVE CERVICAL SPINE WITH EVIDENCE OF INTERVAL ANTERIOR FUSION FROM C6 TO T1 AND INTERVAL DISCECTOMIES RESULTING IN IMPROVEMENT IN THE PATENCY OF THE CENTRAL SPINAL CANAL AT THE C6-7 AND C7-T1 LEVELS IN THE INTERVAL SINCE THE PREVIOUS STUDY.    2. OTHER MULTILEVEL CENTRAL CANAL AND FORAMINAL STENOSES OF VARYING   SEVERITY AND DEGENERATIVE ORIGIN AS OUTLINED ABOVE, SOME OF WHICH HAVE   WORSENED IN THE INTERVAL SINCE TH PREVIOUS EXAMINATION.    3. 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    -     REFERRAL TO PAIN CLINIC    DDD (degenerative disc disease), lumbar  Chronic.  Denies any loss of bowel or bladder function, foot drop or saddle anesthesia.  States he does suffer from some back pain.  States he does exercise almost daily and he does stretching at home.  Declines physical therapy at this time.  Referral placed to pain clinic.    Last MRI in 2009 states:   MULTILEVEL DEGENERATIVE DISC DISEASE IN THE LUMBAR SPINE WITH MULTILEVEL CENTRAL CANAL AND FORAMINAL STENOSES OF VARYING SEVERITY AND DEGENERATIVE ORIGIN AS OUTLINED ABOVE.    -     REFERRAL TO PAIN CLINIC        Return in about 6 months (around 1/16/2019).        Mabel CORRAL

## 2018-08-21 ENCOUNTER — OFFICE VISIT (OUTPATIENT)
Dept: CARDIOLOGY | Facility: MEDICAL CENTER | Age: 58
End: 2018-08-21
Payer: COMMERCIAL

## 2018-08-21 VITALS
HEART RATE: 50 BPM | HEIGHT: 74 IN | OXYGEN SATURATION: 97 % | WEIGHT: 207 LBS | BODY MASS INDEX: 26.56 KG/M2 | DIASTOLIC BLOOD PRESSURE: 80 MMHG | SYSTOLIC BLOOD PRESSURE: 130 MMHG

## 2018-08-21 DIAGNOSIS — I48.92 ATRIAL FLUTTER, UNSPECIFIED TYPE (HCC): ICD-10-CM

## 2018-08-21 DIAGNOSIS — Z98.890 H/O CARDIAC RADIOFREQUENCY ABLATION: ICD-10-CM

## 2018-08-21 PROCEDURE — 93000 ELECTROCARDIOGRAM COMPLETE: CPT | Performed by: NURSE PRACTITIONER

## 2018-08-21 PROCEDURE — 99214 OFFICE O/P EST MOD 30 MIN: CPT | Performed by: NURSE PRACTITIONER

## 2018-08-21 ASSESSMENT — ENCOUNTER SYMPTOMS
SPUTUM PRODUCTION: 0
SORE THROAT: 0
FEVER: 0
HEMOPTYSIS: 0
HEARTBURN: 0
SPEECH CHANGE: 0
DIZZINESS: 0
STRIDOR: 0
WHEEZING: 0
TINGLING: 0
WEIGHT LOSS: 0
DIARRHEA: 0
TREMORS: 0
LOSS OF CONSCIOUSNESS: 0
HEADACHES: 0
BLOOD IN STOOL: 0
SENSORY CHANGE: 0
ABDOMINAL PAIN: 0
COUGH: 0
PND: 0
DOUBLE VISION: 0
PALPITATIONS: 0
PSYCHIATRIC NEGATIVE: 1
ORTHOPNEA: 0
NAUSEA: 0
BLURRED VISION: 0
MUSCULOSKELETAL NEGATIVE: 1
VOMITING: 0
SHORTNESS OF BREATH: 0
CHILLS: 0
FOCAL WEAKNESS: 0

## 2018-08-21 NOTE — LETTER
[facility]   [address], [address]  [place] [address]  Phone: [phone]  Fax: [phone]              Edgar Jackson  1960    Encounter Date: 2018    ELLIS Acuña          PROGRESS NOTE:  Cardiology/Electrophysiology Follow-up Note      Subjective:   Chief Complaint:   Chief Complaint   Patient presents with   • Atrial Flutter     2 month follow up       Edgar Jackson is a 57 y.o. male who presents today for follow up atrial flutter.     Edgar Jackson is a 57 y.o. male who presents today for follow up post atrial flutter ablation 18.     Patient was came to [facility] with complaints of nausea and dizziness x 2 days.  EKG in the ER revealed atrial flutter with a controlled ventricular rate.  Patient had reported noticing elevations to his heart rate with workouts prior to admission.  TSH was checked and found to be normal.       Procedural conclusions per Dr. Benedict's Procedure Note:  Arrhythmias:  1. Sustained typical atrial flutter,  ms with 2:1 AV conduction.   Mapping:  Electroanatomical 3D (CARTO FAM) map of CS and right atrium around the cavotricuspid isthmus was created during CS pacing.  Ablation:  Radiofrequency energy was applied using 3.5 mmsaline irrigated catheter (Biosense ST SF), power 0 W, total 8 RF applications, RF time 597 seconds to create a cavotricuspid isthmus line between the tricuspid annulus and Eustachian ridge/inferior vena cava to produce bidirectional isthmus conduction block and termination of flutter.  Post Ablation Testin. No inducible atrial flutter with CS pacing post ablation.  2. Trans-isthmus conduction time pacing from proximal  msec.  3. Trans-isthmus conduction time pacing from lateral to the isthmus line 160 msec.  4. Rhythm at end of procedure is sinus bradycardia CL 1573 msec.  Impressions/Plan:   1. Typical right atrial flutter.  2. Successful catheter mediated  ablation of right atrial flutter.  3. Admit to monitored bed overnight, continue OAC x at least 4 weeks.    Today in follow up he states that he has been feeling well.  No complaints of dizziness, chest pain, pre syncope, syncope, dyspnea, PND, or lower extremity edema.     He has been working out and feeling well during without any symptoms.  He monitors his heart rate with activity but not while resting.     Past Medical History:   Diagnosis Date   • Back pain    • Neck pain      Past Surgical History:   Procedure Laterality Date   • OTHER  06/2018    cardiac ablation   • CERVICAL DISK AND FUSION ANTERIOR  2009     Family History   Problem Relation Age of Onset   • Heart Disease Father      Social History     Social History   • Marital status:      Spouse name: N/A   • Number of children: N/A   • Years of education: N/A     Occupational History   • Not on file.     Social History Main Topics   • Smoking status: Never Smoker   • Smokeless tobacco: Never Used   • Alcohol use Yes      Comment: 1-2 drinks   • Drug use: No   • Sexual activity: Yes     Partners: Female     Other Topics Concern   • Not on file     Social History Narrative   • No narrative on file     Allergies   Allergen Reactions   • Nkda [No Known Drug Allergy]        No current outpatient prescriptions on file.     No current facility-administered medications for this visit.        Review of Systems   Constitutional: Negative for chills, fever, malaise/fatigue and weight loss.   HENT: Negative for congestion, sore throat and tinnitus.    Eyes: Negative for blurred vision and double vision.   Respiratory: Negative for cough, hemoptysis, sputum production, shortness of breath, wheezing and stridor.    Cardiovascular: Negative for chest pain, palpitations, orthopnea, leg swelling and PND.   Gastrointestinal: Negative for abdominal pain, blood in stool, diarrhea, heartburn, melena, nausea and vomiting.   Genitourinary: Negative.    Musculoskeletal:  "Negative.    Skin: Negative for rash.   Neurological: Negative for dizziness, tingling, tremors, sensory change, speech change, focal weakness, loss of consciousness and headaches.   Psychiatric/Behavioral: Negative.      All others systems reviewed and negative.     Objective:     Blood pressure 130/80, pulse (!) 50, height 1.88 m (6' 2\"), weight 93.9 kg (207 lb), SpO2 97 %. Body mass index is 26.58 kg/m².    Physical Exam   Constitutional: He is oriented to person, place, and time and well-developed, well-nourished, and in no distress.   HENT:   Head: Normocephalic and atraumatic.   Eyes: Pupils are equal, round, and reactive to light. Conjunctivae and EOM are normal.   Neck: Normal range of motion. Neck supple. No JVD present. No tracheal deviation present.   Cardiovascular: Regular rhythm, normal heart sounds and intact distal pulses.  Bradycardia present.  Exam reveals no gallop and no friction rub.    No murmur heard.  Pulses:       Radial pulses are 2+ on the right side, and 2+ on the left side.        Dorsalis pedis pulses are 2+ on the right side, and 2+ on the left side.        Posterior tibial pulses are 2+ on the right side, and 2+ on the left side.   Pulmonary/Chest: Effort normal and breath sounds normal. No respiratory distress. He has no wheezes. He has no rales. He exhibits no tenderness.   Abdominal: Soft. Bowel sounds are normal.   Musculoskeletal: Normal range of motion. He exhibits no edema.   Neurological: He is alert and oriented to person, place, and time.   Skin: Skin is warm and dry.   Psychiatric: Mood, memory, affect and judgment normal.         Cardiac Imaging and Procedures Review:    EKG dated 8/21/18:   Sinus bradycardia, rate 45.     Echo dated 5/31/18:   CONCLUSIONS  No prior study is available for comparison.   Normal left ventricular systolic function.   No evidence of valvular abnormality based on Doppler evaluation.     Labs (personally reviewed and notable for):   Lab Results   "   Component Value Date/Time    SODIUM 138 06/01/2018 03:53 AM    POTASSIUM 4.0 06/01/2018 03:53 AM    CHLORIDE 105 06/01/2018 03:53 AM    CO2 26 06/01/2018 03:53 AM    GLUCOSE 98 06/01/2018 03:53 AM    BUN 10 06/01/2018 03:53 AM    CREATININE 0.93 06/01/2018 03:53 AM    CREATININE 0.96 08/24/2009 08:18 AM    BUNCREATRAT 14 08/24/2009 08:18 AM    GLOMRATE >59 08/24/2009 08:18 AM      Lab Results   Component Value Date/Time    WBC 6.1 05/30/2018 11:56 AM    WBC 5.6 08/24/2009 08:18 AM    RBC 4.91 05/30/2018 11:56 AM    RBC 4.74 08/24/2009 08:18 AM    HEMOGLOBIN 15.6 05/30/2018 11:56 AM    HEMATOCRIT 44.9 05/30/2018 11:56 AM    MCV 91.4 05/30/2018 11:56 AM    MCV 95 08/24/2009 08:18 AM    MCH 31.8 05/30/2018 11:56 AM    MCH 32.0 08/24/2009 08:18 AM    MCHC 34.7 05/30/2018 11:56 AM    MPV 10.3 05/30/2018 11:56 AM    NEUTSPOLYS 66.00 05/30/2018 11:56 AM    LYMPHOCYTES 25.50 05/30/2018 11:56 AM    MONOCYTES 6.70 05/30/2018 11:56 AM    EOSINOPHILS 1.20 05/30/2018 11:56 AM    BASOPHILS 0.30 05/30/2018 11:56 AM      PT/INR:   Lab Results   Component Value Date/Time    PROTHROMBTM 13.7 05/30/2018 11:56 AM    INR 1.08 05/30/2018 11:56 AM   ]      Assessment:     1. Atrial flutter, unspecified type (HCC)  EKG   2. H/O cardiac radiofrequency ablation         Medical Decision Making:  Today's Assessment / Status / Plan:   1. A flutter S/P ablation:  - Clinically doing well without evidence of recurrence of flutter.  - he is off anticoagulation.     2. Bradycardia:  - Suspect secondary to athletic conditioning.  He is asymptomatic.   - Discussed s/s of intolerance to bradycardia and when to seek medical help.   - He will monitor his heart rate at home.    Plan reviewed in detail with the patient and he verbalizes understanding and is in agreement.   RTC in 6 months for review, sooner if clinical condition changes  Collaborating MD/ADD: [place].     JAMAR Acuña.P.RJAMAR Lindo.P.RJAKE  [address] [place] [address]  [address]  [address]  [place] [address]  VIA In Basket

## 2018-08-21 NOTE — PROGRESS NOTES
Cardiology/Electrophysiology Follow-up Note      Subjective:   Chief Complaint:   Chief Complaint   Patient presents with   • Atrial Flutter     2 month follow up       Edgar Jackson is a 57 y.o. male who presents today for follow up atrial flutter.     Edgar Jackson is a 57 y.o. male who presents today for follow up post atrial flutter ablation 18.     Patient was came to [facility] with complaints of nausea and dizziness x 2 days.  EKG in the ER revealed atrial flutter with a controlled ventricular rate.  Patient had reported noticing elevations to his heart rate with workouts prior to admission.  TSH was checked and found to be normal.       Procedural conclusions per Dr. Benedict's Procedure Note:  Arrhythmias:  1. Sustained typical atrial flutter,  ms with 2:1 AV conduction.   Mapping:  Electroanatomical 3D (CARTO FAM) map of CS and right atrium around the cavotricuspid isthmus was created during CS pacing.  Ablation:  Radiofrequency energy was applied using 3.5 mmsaline irrigated catheter ([employer]bridge Digital ST SF), power 0 W, total 8 RF applications, RF time 597 seconds to create a cavotricuspid isthmus line between the tricuspid annulus and Eustachian ridge/inferior vena cava to produce bidirectional isthmus conduction block and termination of flutter.  Post Ablation Testin. No inducible atrial flutter with CS pacing post ablation.  2. Trans-isthmus conduction time pacing from proximal  msec.  3. Trans-isthmus conduction time pacing from lateral to the isthmus line 160 msec.  4. Rhythm at end of procedure is sinus bradycardia CL 1573 msec.  Impressions/Plan:   1. Typical right atrial flutter.  2. Successful catheter mediated ablation of right atrial flutter.  3. Admit to monitored bed overnight, continue OAC x at least 4 weeks.    Today in follow up he states that he has been feeling well.  No complaints of dizziness, chest pain, pre syncope, syncope, dyspnea, PND, or lower extremity edema.      He has been working out and feeling well during without any symptoms.  He monitors his heart rate with activity but not while resting.     Past Medical History:   Diagnosis Date   • Back pain    • Neck pain      Past Surgical History:   Procedure Laterality Date   • OTHER  06/2018    cardiac ablation   • CERVICAL DISK AND FUSION ANTERIOR  2009     Family History   Problem Relation Age of Onset   • Heart Disease Father      Social History     Social History   • Marital status:      Spouse name: N/A   • Number of children: N/A   • Years of education: N/A     Occupational History   • Not on file.     Social History Main Topics   • Smoking status: Never Smoker   • Smokeless tobacco: Never Used   • Alcohol use Yes      Comment: 1-2 drinks   • Drug use: No   • Sexual activity: Yes     Partners: Female     Other Topics Concern   • Not on file     Social History Narrative   • No narrative on file     Allergies   Allergen Reactions   • Nkda [No Known Drug Allergy]        No current outpatient prescriptions on file.     No current facility-administered medications for this visit.        Review of Systems   Constitutional: Negative for chills, fever, malaise/fatigue and weight loss.   HENT: Negative for congestion, sore throat and tinnitus.    Eyes: Negative for blurred vision and double vision.   Respiratory: Negative for cough, hemoptysis, sputum production, shortness of breath, wheezing and stridor.    Cardiovascular: Negative for chest pain, palpitations, orthopnea, leg swelling and PND.   Gastrointestinal: Negative for abdominal pain, blood in stool, diarrhea, heartburn, melena, nausea and vomiting.   Genitourinary: Negative.    Musculoskeletal: Negative.    Skin: Negative for rash.   Neurological: Negative for dizziness, tingling, tremors, sensory change, speech change, focal weakness, loss of consciousness and headaches.   Psychiatric/Behavioral: Negative.      All others systems reviewed and negative.      "Objective:     Blood pressure 130/80, pulse (!) 50, height 1.88 m (6' 2\"), weight 93.9 kg (207 lb), SpO2 97 %. Body mass index is 26.58 kg/m².    Physical Exam   Constitutional: He is oriented to person, place, and time and well-developed, well-nourished, and in no distress.   HENT:   Head: Normocephalic and atraumatic.   Eyes: Pupils are equal, round, and reactive to light. Conjunctivae and EOM are normal.   Neck: Normal range of motion. Neck supple. No JVD present. No tracheal deviation present.   Cardiovascular: Regular rhythm, normal heart sounds and intact distal pulses.  Bradycardia present.  Exam reveals no gallop and no friction rub.    No murmur heard.  Pulses:       Radial pulses are 2+ on the right side, and 2+ on the left side.        Dorsalis pedis pulses are 2+ on the right side, and 2+ on the left side.        Posterior tibial pulses are 2+ on the right side, and 2+ on the left side.   Pulmonary/Chest: Effort normal and breath sounds normal. No respiratory distress. He has no wheezes. He has no rales. He exhibits no tenderness.   Abdominal: Soft. Bowel sounds are normal.   Musculoskeletal: Normal range of motion. He exhibits no edema.   Neurological: He is alert and oriented to person, place, and time.   Skin: Skin is warm and dry.   Psychiatric: Mood, memory, affect and judgment normal.         Cardiac Imaging and Procedures Review:    EKG dated 8/21/18:   Sinus bradycardia, rate 45.     Echo dated 5/31/18:   CONCLUSIONS  No prior study is available for comparison.   Normal left ventricular systolic function.   No evidence of valvular abnormality based on Doppler evaluation.     Labs (personally reviewed and notable for):   Lab Results   Component Value Date/Time    SODIUM 138 06/01/2018 03:53 AM    POTASSIUM 4.0 06/01/2018 03:53 AM    CHLORIDE 105 06/01/2018 03:53 AM    CO2 26 06/01/2018 03:53 AM    GLUCOSE 98 06/01/2018 03:53 AM    BUN 10 06/01/2018 03:53 AM    CREATININE 0.93 06/01/2018 03:53 AM "    CREATININE 0.96 08/24/2009 08:18 AM    BUNCREATRAT 14 08/24/2009 08:18 AM    GLOMRATE >59 08/24/2009 08:18 AM      Lab Results   Component Value Date/Time    WBC 6.1 05/30/2018 11:56 AM    WBC 5.6 08/24/2009 08:18 AM    RBC 4.91 05/30/2018 11:56 AM    RBC 4.74 08/24/2009 08:18 AM    HEMOGLOBIN 15.6 05/30/2018 11:56 AM    HEMATOCRIT 44.9 05/30/2018 11:56 AM    MCV 91.4 05/30/2018 11:56 AM    MCV 95 08/24/2009 08:18 AM    MCH 31.8 05/30/2018 11:56 AM    MCH 32.0 08/24/2009 08:18 AM    MCHC 34.7 05/30/2018 11:56 AM    MPV 10.3 05/30/2018 11:56 AM    NEUTSPOLYS 66.00 05/30/2018 11:56 AM    LYMPHOCYTES 25.50 05/30/2018 11:56 AM    MONOCYTES 6.70 05/30/2018 11:56 AM    EOSINOPHILS 1.20 05/30/2018 11:56 AM    BASOPHILS 0.30 05/30/2018 11:56 AM      PT/INR:   Lab Results   Component Value Date/Time    PROTHROMBTM 13.7 05/30/2018 11:56 AM    INR 1.08 05/30/2018 11:56 AM   ]      Assessment:     1. Atrial flutter, unspecified type (HCC)  EKG   2. H/O cardiac radiofrequency ablation         Medical Decision Making:  Today's Assessment / Status / Plan:   1. A flutter S/P ablation:  - Clinically doing well without evidence of recurrence of flutter.  - he is off anticoagulation.     2. Bradycardia:  - Suspect secondary to athletic conditioning.  He is asymptomatic.   - Discussed s/s of intolerance to bradycardia and when to seek medical help.   - He will monitor his heart rate at home.    Plan reviewed in detail with the patient and he verbalizes understanding and is in agreement.   RTC in 6 months for review, sooner if clinical condition changes  Collaborating MD/ADD: [place].     ELLIS Acuña

## 2018-08-22 LAB — EKG IMPRESSION: NORMAL

## 2018-11-01 ENCOUNTER — HOSPITAL ENCOUNTER (OUTPATIENT)
Facility: MEDICAL CENTER | Age: 58
End: 2018-11-01
Payer: COMMERCIAL

## 2018-11-01 LAB
BDY FAT % MEASURED: 20.2 %
BP DIAS: 92 MMHG
BP SYS: 138 MMHG
DIABETES HTDIA: NO
EVENT NAME HTEVT: NORMAL
HYPERTENSION HTHYP: NO
SCREENING LOC CITY HTCIT: NORMAL
SCREENING LOC STATE HTSTA: NORMAL
SCREENING LOCATION HTLOC: NORMAL
SUBSCRIBER ID HTSID: NORMAL

## 2018-11-02 LAB
CHOLEST SERPL-MCNC: 187 MG/DL (ref 100–199)
FASTING STATUS PATIENT QL REPORTED: NORMAL
GLUCOSE SERPL-MCNC: 96 MG/DL (ref 65–99)
HDLC SERPL-MCNC: 66 MG/DL
LDLC SERPL CALC-MCNC: 112 MG/DL
TRIGL SERPL-MCNC: 44 MG/DL (ref 0–149)

## 2018-11-26 ENCOUNTER — HOSPITAL ENCOUNTER (OUTPATIENT)
Dept: RADIOLOGY | Facility: MEDICAL CENTER | Age: 58
DRG: 454 | End: 2018-11-26
Attending: NEUROLOGICAL SURGERY
Payer: COMMERCIAL

## 2018-11-26 DIAGNOSIS — Z01.811 PRE-OPERATIVE RESPIRATORY EXAMINATION: ICD-10-CM

## 2018-11-26 DIAGNOSIS — Z01.812 PRE-OPERATIVE LABORATORY EXAMINATION: ICD-10-CM

## 2018-11-26 DIAGNOSIS — Z01.810 PRE-OPERATIVE CARDIOVASCULAR EXAMINATION: ICD-10-CM

## 2018-11-26 LAB
ANION GAP SERPL CALC-SCNC: 8 MMOL/L (ref 0–11.9)
APPEARANCE UR: CLEAR
APTT PPP: 29.5 SEC (ref 24.7–36)
BASOPHILS # BLD AUTO: 0.7 % (ref 0–1.8)
BASOPHILS # BLD: 0.03 K/UL (ref 0–0.12)
BILIRUB UR QL STRIP.AUTO: NEGATIVE
BUN SERPL-MCNC: 12 MG/DL (ref 8–22)
CALCIUM SERPL-MCNC: 9.6 MG/DL (ref 8.5–10.5)
CHLORIDE SERPL-SCNC: 103 MMOL/L (ref 96–112)
CO2 SERPL-SCNC: 29 MMOL/L (ref 20–33)
COLOR UR: YELLOW
CREAT SERPL-MCNC: 1 MG/DL (ref 0.5–1.4)
EKG IMPRESSION: NORMAL
EOSINOPHIL # BLD AUTO: 0.13 K/UL (ref 0–0.51)
EOSINOPHIL NFR BLD: 2.8 % (ref 0–6.9)
ERYTHROCYTE [DISTWIDTH] IN BLOOD BY AUTOMATED COUNT: 46.5 FL (ref 35.9–50)
GLUCOSE SERPL-MCNC: 89 MG/DL (ref 65–99)
GLUCOSE UR STRIP.AUTO-MCNC: NEGATIVE MG/DL
HCT VFR BLD AUTO: 49.4 % (ref 42–52)
HGB BLD-MCNC: 16 G/DL (ref 14–18)
IMM GRANULOCYTES # BLD AUTO: 0.01 K/UL (ref 0–0.11)
IMM GRANULOCYTES NFR BLD AUTO: 0.2 % (ref 0–0.9)
INR PPP: 1.05 (ref 0.87–1.13)
KETONES UR STRIP.AUTO-MCNC: ABNORMAL MG/DL
LEUKOCYTE ESTERASE UR QL STRIP.AUTO: NEGATIVE
LYMPHOCYTES # BLD AUTO: 1.92 K/UL (ref 1–4.8)
LYMPHOCYTES NFR BLD: 41.6 % (ref 22–41)
MCH RBC QN AUTO: 31.2 PG (ref 27–33)
MCHC RBC AUTO-ENTMCNC: 32.4 G/DL (ref 33.7–35.3)
MCV RBC AUTO: 96.3 FL (ref 81.4–97.8)
MICRO URNS: ABNORMAL
MONOCYTES # BLD AUTO: 0.51 K/UL (ref 0–0.85)
MONOCYTES NFR BLD AUTO: 11.1 % (ref 0–13.4)
NEUTROPHILS # BLD AUTO: 2.01 K/UL (ref 1.82–7.42)
NEUTROPHILS NFR BLD: 43.6 % (ref 44–72)
NITRITE UR QL STRIP.AUTO: NEGATIVE
NRBC # BLD AUTO: 0 K/UL
NRBC BLD-RTO: 0 /100 WBC
PH UR STRIP.AUTO: 5.5 [PH]
PLATELET # BLD AUTO: 155 K/UL (ref 164–446)
PMV BLD AUTO: 10 FL (ref 9–12.9)
POTASSIUM SERPL-SCNC: 4.6 MMOL/L (ref 3.6–5.5)
PROT UR QL STRIP: NEGATIVE MG/DL
PROTHROMBIN TIME: 13.8 SEC (ref 12–14.6)
RBC # BLD AUTO: 5.13 M/UL (ref 4.7–6.1)
RBC UR QL AUTO: NEGATIVE
SODIUM SERPL-SCNC: 140 MMOL/L (ref 135–145)
SP GR UR STRIP.AUTO: 1.02
UROBILINOGEN UR STRIP.AUTO-MCNC: 0.2 MG/DL
WBC # BLD AUTO: 4.6 K/UL (ref 4.8–10.8)

## 2018-11-26 PROCEDURE — 85730 THROMBOPLASTIN TIME PARTIAL: CPT

## 2018-11-26 PROCEDURE — 93010 ELECTROCARDIOGRAM REPORT: CPT | Performed by: INTERNAL MEDICINE

## 2018-11-26 PROCEDURE — 85025 COMPLETE CBC W/AUTO DIFF WBC: CPT

## 2018-11-26 PROCEDURE — 85610 PROTHROMBIN TIME: CPT

## 2018-11-26 PROCEDURE — 93005 ELECTROCARDIOGRAM TRACING: CPT

## 2018-11-26 PROCEDURE — 80048 BASIC METABOLIC PNL TOTAL CA: CPT

## 2018-11-26 PROCEDURE — 81003 URINALYSIS AUTO W/O SCOPE: CPT

## 2018-11-26 PROCEDURE — 36415 COLL VENOUS BLD VENIPUNCTURE: CPT

## 2018-11-26 PROCEDURE — 71045 X-RAY EXAM CHEST 1 VIEW: CPT

## 2018-11-26 RX ORDER — UBIDECARENONE 75 MG
100 CAPSULE ORAL DAILY
Status: ON HOLD | COMMUNITY
End: 2018-12-05

## 2018-12-04 ENCOUNTER — HOSPITAL ENCOUNTER (INPATIENT)
Facility: MEDICAL CENTER | Age: 58
LOS: 1 days | DRG: 454 | End: 2018-12-05
Attending: NEUROLOGICAL SURGERY | Admitting: NEUROLOGICAL SURGERY
Payer: COMMERCIAL

## 2018-12-04 ENCOUNTER — APPOINTMENT (OUTPATIENT)
Dept: RADIOLOGY | Facility: MEDICAL CENTER | Age: 58
DRG: 454 | End: 2018-12-04
Attending: NEUROLOGICAL SURGERY
Payer: COMMERCIAL

## 2018-12-04 DIAGNOSIS — M54.2 CERVICALGIA: ICD-10-CM

## 2018-12-04 LAB
ABO GROUP BLD: NORMAL
ABO GROUP BLD: NORMAL
BLD GP AB SCN SERPL QL: NORMAL
RH BLD: NORMAL
RH BLD: NORMAL

## 2018-12-04 PROCEDURE — 700102 HCHG RX REV CODE 250 W/ 637 OVERRIDE(OP): Performed by: ANESTHESIOLOGY

## 2018-12-04 PROCEDURE — 500885 HCHG PACK, JACKSON TABLE: Performed by: NEUROLOGICAL SURGERY

## 2018-12-04 PROCEDURE — 700111 HCHG RX REV CODE 636 W/ 250 OVERRIDE (IP)

## 2018-12-04 PROCEDURE — 95925 SOMATOSENSORY TESTING: CPT | Performed by: NEUROLOGICAL SURGERY

## 2018-12-04 PROCEDURE — 502240 HCHG MISC OR SUPPLY RC 0272: Performed by: NEUROLOGICAL SURGERY

## 2018-12-04 PROCEDURE — 700111 HCHG RX REV CODE 636 W/ 250 OVERRIDE (IP): Performed by: NEUROLOGICAL SURGERY

## 2018-12-04 PROCEDURE — 160002 HCHG RECOVERY MINUTES (STAT): Performed by: NEUROLOGICAL SURGERY

## 2018-12-04 PROCEDURE — 160035 HCHG PACU - 1ST 60 MINS PHASE I: Performed by: NEUROLOGICAL SURGERY

## 2018-12-04 PROCEDURE — 95939 C MOTOR EVOKED UPR&LWR LIMBS: CPT | Performed by: NEUROLOGICAL SURGERY

## 2018-12-04 PROCEDURE — 86850 RBC ANTIBODY SCREEN: CPT

## 2018-12-04 PROCEDURE — 110454 HCHG SHELL REV 250: Performed by: NEUROLOGICAL SURGERY

## 2018-12-04 PROCEDURE — 0RG20AJ FUSION OF 2 OR MORE CERVICAL VERTEBRAL JOINTS WITH INTERBODY FUSION DEVICE, POSTERIOR APPROACH, ANTERIOR COLUMN, OPEN APPROACH: ICD-10-PCS | Performed by: NEUROLOGICAL SURGERY

## 2018-12-04 PROCEDURE — 95861 NEEDLE EMG 2 EXTREMITIES: CPT | Performed by: NEUROLOGICAL SURGERY

## 2018-12-04 PROCEDURE — C1713 ANCHOR/SCREW BN/BN,TIS/BN: HCPCS | Performed by: NEUROLOGICAL SURGERY

## 2018-12-04 PROCEDURE — 160036 HCHG PACU - EA ADDL 30 MINS PHASE I: Performed by: NEUROLOGICAL SURGERY

## 2018-12-04 PROCEDURE — A6402 STERILE GAUZE <= 16 SQ IN: HCPCS | Performed by: NEUROLOGICAL SURGERY

## 2018-12-04 PROCEDURE — A9270 NON-COVERED ITEM OR SERVICE: HCPCS | Performed by: ANESTHESIOLOGY

## 2018-12-04 PROCEDURE — 160009 HCHG ANES TIME/MIN: Performed by: NEUROLOGICAL SURGERY

## 2018-12-04 PROCEDURE — 160048 HCHG OR STATISTICAL LEVEL 1-5: Performed by: NEUROLOGICAL SURGERY

## 2018-12-04 PROCEDURE — 95940 IONM IN OPERATNG ROOM 15 MIN: CPT | Performed by: NEUROLOGICAL SURGERY

## 2018-12-04 PROCEDURE — 700111 HCHG RX REV CODE 636 W/ 250 OVERRIDE (IP): Performed by: ANESTHESIOLOGY

## 2018-12-04 PROCEDURE — 95937 NEUROMUSCULAR JUNCTION TEST: CPT | Performed by: NEUROLOGICAL SURGERY

## 2018-12-04 PROCEDURE — 86900 BLOOD TYPING SEROLOGIC ABO: CPT

## 2018-12-04 PROCEDURE — 86901 BLOOD TYPING SEROLOGIC RH(D): CPT

## 2018-12-04 PROCEDURE — 500367 HCHG DRAIN KIT, HEMOVAC: Performed by: NEUROLOGICAL SURGERY

## 2018-12-04 PROCEDURE — 72040 X-RAY EXAM NECK SPINE 2-3 VW: CPT

## 2018-12-04 PROCEDURE — 160029 HCHG SURGERY MINUTES - 1ST 30 MINS LEVEL 4: Performed by: NEUROLOGICAL SURGERY

## 2018-12-04 PROCEDURE — A9270 NON-COVERED ITEM OR SERVICE: HCPCS | Performed by: PHYSICIAN ASSISTANT

## 2018-12-04 PROCEDURE — 0RG2071 FUSION OF 2 OR MORE CERVICAL VERTEBRAL JOINTS WITH AUTOLOGOUS TISSUE SUBSTITUTE, POSTERIOR APPROACH, POSTERIOR COLUMN, OPEN APPROACH: ICD-10-PCS | Performed by: NEUROLOGICAL SURGERY

## 2018-12-04 PROCEDURE — 0RB30ZZ EXCISION OF CERVICAL VERTEBRAL DISC, OPEN APPROACH: ICD-10-PCS | Performed by: NEUROLOGICAL SURGERY

## 2018-12-04 PROCEDURE — 700101 HCHG RX REV CODE 250: Performed by: PHYSICIAN ASSISTANT

## 2018-12-04 PROCEDURE — 501838 HCHG SUTURE GENERAL: Performed by: NEUROLOGICAL SURGERY

## 2018-12-04 PROCEDURE — 160041 HCHG SURGERY MINUTES - EA ADDL 1 MIN LEVEL 4: Performed by: NEUROLOGICAL SURGERY

## 2018-12-04 PROCEDURE — 700111 HCHG RX REV CODE 636 W/ 250 OVERRIDE (IP): Performed by: PHYSICIAN ASSISTANT

## 2018-12-04 PROCEDURE — 95865 NEEDLE EMG LARYNX: CPT | Performed by: NEUROLOGICAL SURGERY

## 2018-12-04 PROCEDURE — 700101 HCHG RX REV CODE 250

## 2018-12-04 PROCEDURE — 4A11X4G MONITORING OF PERIPHERAL NERVOUS ELECTRICAL ACTIVITY, INTRAOPERATIVE, EXTERNAL APPROACH: ICD-10-PCS | Performed by: NEUROLOGICAL SURGERY

## 2018-12-04 PROCEDURE — 502000 HCHG MISC OR IMPLANTS RC 0278: Performed by: NEUROLOGICAL SURGERY

## 2018-12-04 PROCEDURE — 770001 HCHG ROOM/CARE - MED/SURG/GYN PRIV*

## 2018-12-04 PROCEDURE — 700112 HCHG RX REV CODE 229: Performed by: PHYSICIAN ASSISTANT

## 2018-12-04 PROCEDURE — 700102 HCHG RX REV CODE 250 W/ 637 OVERRIDE(OP): Performed by: PHYSICIAN ASSISTANT

## 2018-12-04 PROCEDURE — 95955 EEG DURING SURGERY: CPT | Performed by: NEUROLOGICAL SURGERY

## 2018-12-04 PROCEDURE — 500864 HCHG NEEDLE, SPINAL 18G: Performed by: NEUROLOGICAL SURGERY

## 2018-12-04 DEVICE — GRAFT ACTIFUSE ABX PUTTY 1.5ML: Type: IMPLANTABLE DEVICE | Status: FUNCTIONAL

## 2018-12-04 RX ORDER — ACETAMINOPHEN 500 MG
1000 TABLET ORAL ONCE
Status: DISCONTINUED | OUTPATIENT
Start: 2018-12-04 | End: 2018-12-04 | Stop reason: HOSPADM

## 2018-12-04 RX ORDER — CEPHALEXIN 500 MG/1
500 CAPSULE ORAL EVERY 6 HOURS
Status: DISCONTINUED | OUTPATIENT
Start: 2018-12-05 | End: 2018-12-05 | Stop reason: HOSPADM

## 2018-12-04 RX ORDER — PROMETHAZINE HYDROCHLORIDE 25 MG/1
12.5-25 TABLET ORAL EVERY 4 HOURS PRN
Status: DISCONTINUED | OUTPATIENT
Start: 2018-12-04 | End: 2018-12-05 | Stop reason: HOSPADM

## 2018-12-04 RX ORDER — ACETAMINOPHEN 325 MG/1
1300 TABLET ORAL EVERY 4 HOURS PRN
Status: ON HOLD | COMMUNITY
End: 2018-12-05

## 2018-12-04 RX ORDER — CEFAZOLIN SODIUM 2 G/100ML
2 INJECTION, SOLUTION INTRAVENOUS EVERY 8 HOURS
Status: COMPLETED | OUTPATIENT
Start: 2018-12-04 | End: 2018-12-04

## 2018-12-04 RX ORDER — SODIUM CHLORIDE, SODIUM LACTATE, POTASSIUM CHLORIDE, AND CALCIUM CHLORIDE .6; .31; .03; .02 G/100ML; G/100ML; G/100ML; G/100ML
IRRIGANT IRRIGATION
Status: DISCONTINUED | OUTPATIENT
Start: 2018-12-04 | End: 2018-12-04 | Stop reason: HOSPADM

## 2018-12-04 RX ORDER — HYDRALAZINE HYDROCHLORIDE 20 MG/ML
10 INJECTION INTRAMUSCULAR; INTRAVENOUS
Status: DISCONTINUED | OUTPATIENT
Start: 2018-12-04 | End: 2018-12-05 | Stop reason: HOSPADM

## 2018-12-04 RX ORDER — SODIUM CHLORIDE, SODIUM LACTATE, POTASSIUM CHLORIDE, CALCIUM CHLORIDE 600; 310; 30; 20 MG/100ML; MG/100ML; MG/100ML; MG/100ML
INJECTION, SOLUTION INTRAVENOUS CONTINUOUS
Status: DISCONTINUED | OUTPATIENT
Start: 2018-12-04 | End: 2018-12-04 | Stop reason: HOSPADM

## 2018-12-04 RX ORDER — HYDROCODONE BITARTRATE AND ACETAMINOPHEN 10; 325 MG/1; MG/1
1-2 TABLET ORAL EVERY 4 HOURS PRN
Status: DISCONTINUED | OUTPATIENT
Start: 2018-12-04 | End: 2018-12-05 | Stop reason: HOSPADM

## 2018-12-04 RX ORDER — HYDROMORPHONE HYDROCHLORIDE 1 MG/ML
0.2 INJECTION, SOLUTION INTRAMUSCULAR; INTRAVENOUS; SUBCUTANEOUS
Status: DISCONTINUED | OUTPATIENT
Start: 2018-12-04 | End: 2018-12-04 | Stop reason: HOSPADM

## 2018-12-04 RX ORDER — ONDANSETRON 4 MG/1
4 TABLET, ORALLY DISINTEGRATING ORAL EVERY 4 HOURS PRN
Status: DISCONTINUED | OUTPATIENT
Start: 2018-12-04 | End: 2018-12-05 | Stop reason: HOSPADM

## 2018-12-04 RX ORDER — DIPHENHYDRAMINE HCL 25 MG
25 TABLET ORAL EVERY 6 HOURS PRN
Status: DISCONTINUED | OUTPATIENT
Start: 2018-12-04 | End: 2018-12-05 | Stop reason: HOSPADM

## 2018-12-04 RX ORDER — OXYCODONE HCL 5 MG/5 ML
5 SOLUTION, ORAL ORAL
Status: DISCONTINUED | OUTPATIENT
Start: 2018-12-04 | End: 2018-12-04 | Stop reason: HOSPADM

## 2018-12-04 RX ORDER — POLYETHYLENE GLYCOL 3350 17 G/17G
1 POWDER, FOR SOLUTION ORAL 2 TIMES DAILY PRN
Status: DISCONTINUED | OUTPATIENT
Start: 2018-12-04 | End: 2018-12-05 | Stop reason: HOSPADM

## 2018-12-04 RX ORDER — BACITRACIN 50000 [IU]/1
INJECTION, POWDER, FOR SOLUTION INTRAMUSCULAR
Status: DISCONTINUED | OUTPATIENT
Start: 2018-12-04 | End: 2018-12-04 | Stop reason: HOSPADM

## 2018-12-04 RX ORDER — AMOXICILLIN 250 MG
1 CAPSULE ORAL
Status: DISCONTINUED | OUTPATIENT
Start: 2018-12-04 | End: 2018-12-05 | Stop reason: HOSPADM

## 2018-12-04 RX ORDER — DIPHENHYDRAMINE HCL 25 MG
25 TABLET ORAL EVERY 8 HOURS PRN
Status: DISCONTINUED | OUTPATIENT
Start: 2018-12-04 | End: 2018-12-04

## 2018-12-04 RX ORDER — DEXAMETHASONE SODIUM PHOSPHATE 4 MG/ML
4 INJECTION, SOLUTION INTRA-ARTICULAR; INTRALESIONAL; INTRAMUSCULAR; INTRAVENOUS; SOFT TISSUE EVERY 6 HOURS
Status: COMPLETED | OUTPATIENT
Start: 2018-12-04 | End: 2018-12-05

## 2018-12-04 RX ORDER — HYDROMORPHONE HYDROCHLORIDE 1 MG/ML
0.4 INJECTION, SOLUTION INTRAMUSCULAR; INTRAVENOUS; SUBCUTANEOUS
Status: DISCONTINUED | OUTPATIENT
Start: 2018-12-04 | End: 2018-12-04 | Stop reason: HOSPADM

## 2018-12-04 RX ORDER — MIDAZOLAM HYDROCHLORIDE 1 MG/ML
1 INJECTION INTRAMUSCULAR; INTRAVENOUS
Status: DISCONTINUED | OUTPATIENT
Start: 2018-12-04 | End: 2018-12-04 | Stop reason: HOSPADM

## 2018-12-04 RX ORDER — GABAPENTIN 300 MG/1
600 CAPSULE ORAL ONCE
Status: COMPLETED | OUTPATIENT
Start: 2018-12-04 | End: 2018-12-04

## 2018-12-04 RX ORDER — METHOCARBAMOL 750 MG/1
750 TABLET, FILM COATED ORAL EVERY 8 HOURS PRN
Status: DISCONTINUED | OUTPATIENT
Start: 2018-12-04 | End: 2018-12-05 | Stop reason: HOSPADM

## 2018-12-04 RX ORDER — ONDANSETRON 2 MG/ML
4 INJECTION INTRAMUSCULAR; INTRAVENOUS
Status: DISCONTINUED | OUTPATIENT
Start: 2018-12-04 | End: 2018-12-04 | Stop reason: HOSPADM

## 2018-12-04 RX ORDER — OXYCODONE HCL 10 MG/1
10 TABLET, FILM COATED, EXTENDED RELEASE ORAL ONCE
Status: COMPLETED | OUTPATIENT
Start: 2018-12-04 | End: 2018-12-04

## 2018-12-04 RX ORDER — BISACODYL 10 MG
10 SUPPOSITORY, RECTAL RECTAL
Status: DISCONTINUED | OUTPATIENT
Start: 2018-12-04 | End: 2018-12-05 | Stop reason: HOSPADM

## 2018-12-04 RX ORDER — ALPRAZOLAM 0.25 MG/1
0.25 TABLET ORAL 2 TIMES DAILY PRN
Status: DISCONTINUED | OUTPATIENT
Start: 2018-12-04 | End: 2018-12-05 | Stop reason: HOSPADM

## 2018-12-04 RX ORDER — BUPIVACAINE HYDROCHLORIDE AND EPINEPHRINE 5; 5 MG/ML; UG/ML
INJECTION, SOLUTION EPIDURAL; INTRACAUDAL; PERINEURAL
Status: DISCONTINUED | OUTPATIENT
Start: 2018-12-04 | End: 2018-12-04 | Stop reason: HOSPADM

## 2018-12-04 RX ORDER — SODIUM CHLORIDE, SODIUM LACTATE, POTASSIUM CHLORIDE, CALCIUM CHLORIDE 600; 310; 30; 20 MG/100ML; MG/100ML; MG/100ML; MG/100ML
INJECTION, SOLUTION INTRAVENOUS ONCE
Status: COMPLETED | OUTPATIENT
Start: 2018-12-04 | End: 2018-12-04

## 2018-12-04 RX ORDER — METOPROLOL TARTRATE 1 MG/ML
1 INJECTION, SOLUTION INTRAVENOUS
Status: DISCONTINUED | OUTPATIENT
Start: 2018-12-04 | End: 2018-12-04 | Stop reason: HOSPADM

## 2018-12-04 RX ORDER — AMOXICILLIN 250 MG
1 CAPSULE ORAL NIGHTLY
Status: DISCONTINUED | OUTPATIENT
Start: 2018-12-04 | End: 2018-12-05 | Stop reason: HOSPADM

## 2018-12-04 RX ORDER — OXYCODONE HCL 10 MG/1
10 TABLET, FILM COATED, EXTENDED RELEASE ORAL ONCE
Status: DISCONTINUED | OUTPATIENT
Start: 2018-12-04 | End: 2018-12-04 | Stop reason: HOSPADM

## 2018-12-04 RX ORDER — ENEMA 19; 7 G/133ML; G/133ML
1 ENEMA RECTAL
Status: DISCONTINUED | OUTPATIENT
Start: 2018-12-04 | End: 2018-12-05 | Stop reason: HOSPADM

## 2018-12-04 RX ORDER — HALOPERIDOL 5 MG/ML
1 INJECTION INTRAMUSCULAR
Status: DISCONTINUED | OUTPATIENT
Start: 2018-12-04 | End: 2018-12-04 | Stop reason: HOSPADM

## 2018-12-04 RX ORDER — OXYCODONE HYDROCHLORIDE AND ACETAMINOPHEN 5; 325 MG/1; MG/1
1-2 TABLET ORAL EVERY 4 HOURS PRN
Status: DISCONTINUED | OUTPATIENT
Start: 2018-12-04 | End: 2018-12-05 | Stop reason: HOSPADM

## 2018-12-04 RX ORDER — DIPHENHYDRAMINE HYDROCHLORIDE 50 MG/ML
12.5 INJECTION INTRAMUSCULAR; INTRAVENOUS
Status: DISCONTINUED | OUTPATIENT
Start: 2018-12-04 | End: 2018-12-04 | Stop reason: HOSPADM

## 2018-12-04 RX ORDER — DOCUSATE SODIUM 100 MG/1
100 CAPSULE, LIQUID FILLED ORAL 2 TIMES DAILY
Status: DISCONTINUED | OUTPATIENT
Start: 2018-12-04 | End: 2018-12-05 | Stop reason: HOSPADM

## 2018-12-04 RX ORDER — HYDROMORPHONE HYDROCHLORIDE 1 MG/ML
0.1 INJECTION, SOLUTION INTRAMUSCULAR; INTRAVENOUS; SUBCUTANEOUS
Status: DISCONTINUED | OUTPATIENT
Start: 2018-12-04 | End: 2018-12-04 | Stop reason: HOSPADM

## 2018-12-04 RX ORDER — GABAPENTIN 300 MG/1
600 CAPSULE ORAL ONCE
Status: DISCONTINUED | OUTPATIENT
Start: 2018-12-04 | End: 2018-12-04 | Stop reason: HOSPADM

## 2018-12-04 RX ORDER — OXYCODONE HCL 5 MG/5 ML
10 SOLUTION, ORAL ORAL
Status: DISCONTINUED | OUTPATIENT
Start: 2018-12-04 | End: 2018-12-04 | Stop reason: HOSPADM

## 2018-12-04 RX ORDER — CALCIUM CARBONATE 500 MG/1
500 TABLET, CHEWABLE ORAL 2 TIMES DAILY
Status: DISCONTINUED | OUTPATIENT
Start: 2018-12-04 | End: 2018-12-05 | Stop reason: HOSPADM

## 2018-12-04 RX ORDER — ONDANSETRON 2 MG/ML
4 INJECTION INTRAMUSCULAR; INTRAVENOUS EVERY 4 HOURS PRN
Status: DISCONTINUED | OUTPATIENT
Start: 2018-12-04 | End: 2018-12-05 | Stop reason: HOSPADM

## 2018-12-04 RX ORDER — OXYCODONE HYDROCHLORIDE 5 MG/1
5 TABLET ORAL
Status: DISCONTINUED | OUTPATIENT
Start: 2018-12-04 | End: 2018-12-04 | Stop reason: HOSPADM

## 2018-12-04 RX ORDER — ACETAMINOPHEN 500 MG
1000 TABLET ORAL ONCE
Status: COMPLETED | OUTPATIENT
Start: 2018-12-04 | End: 2018-12-04

## 2018-12-04 RX ORDER — MEPERIDINE HYDROCHLORIDE 25 MG/ML
12.5 INJECTION INTRAMUSCULAR; INTRAVENOUS; SUBCUTANEOUS
Status: DISCONTINUED | OUTPATIENT
Start: 2018-12-04 | End: 2018-12-04 | Stop reason: HOSPADM

## 2018-12-04 RX ORDER — HYDROMORPHONE HYDROCHLORIDE 2 MG/ML
1 INJECTION, SOLUTION INTRAMUSCULAR; INTRAVENOUS; SUBCUTANEOUS
Status: DISCONTINUED | OUTPATIENT
Start: 2018-12-04 | End: 2018-12-05 | Stop reason: HOSPADM

## 2018-12-04 RX ORDER — OXYCODONE HYDROCHLORIDE 5 MG/1
10 TABLET ORAL
Status: DISCONTINUED | OUTPATIENT
Start: 2018-12-04 | End: 2018-12-04 | Stop reason: HOSPADM

## 2018-12-04 RX ORDER — HYDRALAZINE HYDROCHLORIDE 20 MG/ML
5 INJECTION INTRAMUSCULAR; INTRAVENOUS
Status: DISCONTINUED | OUTPATIENT
Start: 2018-12-04 | End: 2018-12-04 | Stop reason: HOSPADM

## 2018-12-04 RX ORDER — DEXTROSE MONOHYDRATE, SODIUM CHLORIDE, AND POTASSIUM CHLORIDE 50; 1.49; 4.5 G/1000ML; G/1000ML; G/1000ML
INJECTION, SOLUTION INTRAVENOUS CONTINUOUS
Status: DISCONTINUED | OUTPATIENT
Start: 2018-12-04 | End: 2018-12-05 | Stop reason: HOSPADM

## 2018-12-04 RX ORDER — PROMETHAZINE HYDROCHLORIDE 25 MG/1
12.5-25 SUPPOSITORY RECTAL EVERY 4 HOURS PRN
Status: DISCONTINUED | OUTPATIENT
Start: 2018-12-04 | End: 2018-12-05 | Stop reason: HOSPADM

## 2018-12-04 RX ADMIN — FENTANYL CITRATE 50 MCG: 50 INJECTION, SOLUTION INTRAMUSCULAR; INTRAVENOUS at 10:26

## 2018-12-04 RX ADMIN — ACETAMINOPHEN 1000 MG: 500 TABLET, FILM COATED ORAL at 10:04

## 2018-12-04 RX ADMIN — LIDOCAINE HYDROCHLORIDE 0.5 ML: 10 INJECTION, SOLUTION EPIDURAL; INFILTRATION; INTRACAUDAL; PERINEURAL at 06:14

## 2018-12-04 RX ADMIN — DEXAMETHASONE SODIUM PHOSPHATE 4 MG: 4 INJECTION, SOLUTION INTRAMUSCULAR; INTRAVENOUS at 17:30

## 2018-12-04 RX ADMIN — DOCUSATE SODIUM 100 MG: 100 CAPSULE, LIQUID FILLED ORAL at 17:30

## 2018-12-04 RX ADMIN — HYDROMORPHONE HYDROCHLORIDE 1 MG: 2 INJECTION INTRAMUSCULAR; INTRAVENOUS; SUBCUTANEOUS at 12:24

## 2018-12-04 RX ADMIN — OXYCODONE HYDROCHLORIDE 10 MG: 10 TABLET, FILM COATED, EXTENDED RELEASE ORAL at 10:04

## 2018-12-04 RX ADMIN — CEFAZOLIN SODIUM 2 G: 2 INJECTION, SOLUTION INTRAVENOUS at 12:05

## 2018-12-04 RX ADMIN — POTASSIUM CHLORIDE, DEXTROSE MONOHYDRATE AND SODIUM CHLORIDE: 150; 5; 450 INJECTION, SOLUTION INTRAVENOUS at 11:57

## 2018-12-04 RX ADMIN — ANTACID TABLETS 500 MG: 500 TABLET, CHEWABLE ORAL at 17:30

## 2018-12-04 RX ADMIN — HYDROMORPHONE HYDROCHLORIDE 0.4 MG: 1 INJECTION, SOLUTION INTRAMUSCULAR; INTRAVENOUS; SUBCUTANEOUS at 10:51

## 2018-12-04 RX ADMIN — HYDROMORPHONE HYDROCHLORIDE 1 MG: 2 INJECTION INTRAMUSCULAR; INTRAVENOUS; SUBCUTANEOUS at 20:50

## 2018-12-04 RX ADMIN — CEFAZOLIN SODIUM 2 G: 2 INJECTION, SOLUTION INTRAVENOUS at 20:51

## 2018-12-04 RX ADMIN — STANDARDIZED SENNA CONCENTRATE AND DOCUSATE SODIUM 1 TABLET: 8.6; 5 TABLET, FILM COATED ORAL at 20:51

## 2018-12-04 RX ADMIN — SODIUM CHLORIDE, SODIUM LACTATE, POTASSIUM CHLORIDE, CALCIUM CHLORIDE: 600; 310; 30; 20 INJECTION, SOLUTION INTRAVENOUS at 06:14

## 2018-12-04 RX ADMIN — DEXAMETHASONE SODIUM PHOSPHATE 4 MG: 4 INJECTION, SOLUTION INTRAMUSCULAR; INTRAVENOUS at 12:05

## 2018-12-04 RX ADMIN — GABAPENTIN 600 MG: 300 CAPSULE ORAL at 10:04

## 2018-12-04 ASSESSMENT — COGNITIVE AND FUNCTIONAL STATUS - GENERAL
SUGGESTED CMS G CODE MODIFIER MOBILITY: CH
SUGGESTED CMS G CODE MODIFIER DAILY ACTIVITY: CH
DAILY ACTIVITIY SCORE: 24
MOBILITY SCORE: 24

## 2018-12-04 ASSESSMENT — PAIN SCALES - GENERAL
PAINLEVEL_OUTOF10: 5
PAINLEVEL_OUTOF10: 6
PAINLEVEL_OUTOF10: 4
PAINLEVEL_OUTOF10: 8
PAINLEVEL_OUTOF10: 8
PAINLEVEL_OUTOF10: 4
PAINLEVEL_OUTOF10: 4
PAINLEVEL_OUTOF10: 6
PAINLEVEL_OUTOF10: 1
PAINLEVEL_OUTOF10: 4
PAINLEVEL_OUTOF10: 6

## 2018-12-04 ASSESSMENT — LIFESTYLE VARIABLES
ALCOHOL_USE: NO
EVER_SMOKED: NEVER

## 2018-12-04 NOTE — PROGRESS NOTES
2 RN skin assessment done; skin WDL.    Surgical incision anterior neck, with hemovac, dressing CDI.

## 2018-12-04 NOTE — PROGRESS NOTES
Admitted from PACU, alert and oriented, walked from [place] to bed, standby assist, with aspen collar, heomovac in place, dressing anterior neck CDI, no drainage noted, updated with plan of care, will continue to monitor.

## 2018-12-04 NOTE — DISCHARGE PLANNING
Current documentation reveals a potential for acute inpatient rehabilitation, pending PT/OT evals.  Please consider a PMR referral to assist with discharge planning.

## 2018-12-04 NOTE — PROGRESS NOTES
Med rec updated and complete  Allergies reviewed  Interviewed pt with wife at bedside with permission from pt.  Pt reports no prescription medications.  Pt reports no antibiotics in the last 30 days.

## 2018-12-04 NOTE — OR SURGEON
Immediate Post OP Note    PreOp Diagnosis: C3-6 DDD, radiculopathy.     PostOp Diagnosis: Same.     Procedure(s):  CERVICAL DISK AND FUSION ANTERIOR-  C3-4, 4-5, 5-6 - Wound Class: Clean with Drain  HARDWARE REMOVAL NEURO-  POSSIBLE REMOVAL OF PRIOR PLATE - Wound Class: Clean with Drain    Surgeon(s):  Placido Marie M.D.    Anesthesiologist/Type of Anesthesia:  Anesthesiologist: Ottoniel Vuong M.D./General    Surgical Staff:  Assistant: Sixto Bartlett P.A.-C.  Circulator: Denisa Bernal R.N.  Scrub Person: Braeden Francis  Radiology Technologist: Dax Tejada    Specimens removed if any:  * No specimens in log *    Estimated Blood Loss: <20 cc     Findings: C3-6 DDD, see dictation.     Complications: None.          12/4/2018 9:34 AM Sixto Bartlett P.A.-C.

## 2018-12-04 NOTE — PROGRESS NOTES
Aspen collar was delivered and fitted to patient.  If any further assistance needed, please call extension [phone] or place order for Ortho Technician assistance as a communication order in [employer]appy shuttle.

## 2018-12-05 VITALS
WEIGHT: 210.32 LBS | OXYGEN SATURATION: 95 % | RESPIRATION RATE: 16 BRPM | TEMPERATURE: 98.7 F | HEART RATE: 72 BPM | SYSTOLIC BLOOD PRESSURE: 119 MMHG | DIASTOLIC BLOOD PRESSURE: 71 MMHG | BODY MASS INDEX: 26.99 KG/M2 | HEIGHT: 74 IN

## 2018-12-05 PROCEDURE — A9270 NON-COVERED ITEM OR SERVICE: HCPCS | Performed by: PHYSICIAN ASSISTANT

## 2018-12-05 PROCEDURE — G8987 SELF CARE CURRENT STATUS: HCPCS | Mod: CH

## 2018-12-05 PROCEDURE — G8979 MOBILITY GOAL STATUS: HCPCS | Mod: CH

## 2018-12-05 PROCEDURE — 700111 HCHG RX REV CODE 636 W/ 250 OVERRIDE (IP): Performed by: PHYSICIAN ASSISTANT

## 2018-12-05 PROCEDURE — G8988 SELF CARE GOAL STATUS: HCPCS | Mod: CH

## 2018-12-05 PROCEDURE — 700102 HCHG RX REV CODE 250 W/ 637 OVERRIDE(OP): Performed by: PHYSICIAN ASSISTANT

## 2018-12-05 PROCEDURE — 700112 HCHG RX REV CODE 229: Performed by: PHYSICIAN ASSISTANT

## 2018-12-05 PROCEDURE — G8989 SELF CARE D/C STATUS: HCPCS | Mod: CH

## 2018-12-05 PROCEDURE — 97161 PT EVAL LOW COMPLEX 20 MIN: CPT

## 2018-12-05 PROCEDURE — G8980 MOBILITY D/C STATUS: HCPCS | Mod: CI

## 2018-12-05 PROCEDURE — G8978 MOBILITY CURRENT STATUS: HCPCS | Mod: CI

## 2018-12-05 PROCEDURE — 97165 OT EVAL LOW COMPLEX 30 MIN: CPT

## 2018-12-05 RX ORDER — METHOCARBAMOL 750 MG/1
750 TABLET, FILM COATED ORAL EVERY 8 HOURS PRN
Qty: 90 TAB | Refills: 0 | Status: ON HOLD | OUTPATIENT
Start: 2018-12-05 | End: 2021-12-07

## 2018-12-05 RX ORDER — CEPHALEXIN 500 MG/1
500 CAPSULE ORAL EVERY 6 HOURS
Qty: 20 CAP | Refills: 0 | Status: ON HOLD | OUTPATIENT
Start: 2018-12-05 | End: 2021-12-07

## 2018-12-05 RX ORDER — OXYCODONE HYDROCHLORIDE AND ACETAMINOPHEN 5; 325 MG/1; MG/1
1-2 TABLET ORAL EVERY 4 HOURS PRN
Qty: 100 TAB | Refills: 0 | Status: SHIPPED | OUTPATIENT
Start: 2018-12-05 | End: 2018-12-15

## 2018-12-05 RX ADMIN — CEPHALEXIN 500 MG: 500 CAPSULE ORAL at 11:46

## 2018-12-05 RX ADMIN — DEXAMETHASONE SODIUM PHOSPHATE 4 MG: 4 INJECTION, SOLUTION INTRAMUSCULAR; INTRAVENOUS at 00:03

## 2018-12-05 RX ADMIN — ANTACID TABLETS 500 MG: 500 TABLET, CHEWABLE ORAL at 04:32

## 2018-12-05 RX ADMIN — DOCUSATE SODIUM 100 MG: 100 CAPSULE, LIQUID FILLED ORAL at 04:32

## 2018-12-05 RX ADMIN — CEPHALEXIN 500 MG: 500 CAPSULE ORAL at 04:32

## 2018-12-05 RX ADMIN — OXYCODONE AND ACETAMINOPHEN 1 TABLET: 5; 325 TABLET ORAL at 04:32

## 2018-12-05 RX ADMIN — HYDROMORPHONE HYDROCHLORIDE 1 MG: 2 INJECTION INTRAMUSCULAR; INTRAVENOUS; SUBCUTANEOUS at 01:11

## 2018-12-05 ASSESSMENT — COGNITIVE AND FUNCTIONAL STATUS - GENERAL
MOBILITY SCORE: 24
SUGGESTED CMS G CODE MODIFIER DAILY ACTIVITY: CH
DAILY ACTIVITIY SCORE: 24
SUGGESTED CMS G CODE MODIFIER MOBILITY: CH

## 2018-12-05 ASSESSMENT — PAIN SCALES - GENERAL
PAINLEVEL_OUTOF10: 3
PAINLEVEL_OUTOF10: 7
PAINLEVEL_OUTOF10: 2
PAINLEVEL_OUTOF10: 4
PAINLEVEL_OUTOF10: 3

## 2018-12-05 ASSESSMENT — GAIT ASSESSMENTS
DISTANCE (FEET): 150
GAIT LEVEL OF ASSIST: SUPERVISED

## 2018-12-05 ASSESSMENT — ACTIVITIES OF DAILY LIVING (ADL): TOILETING: INDEPENDENT

## 2018-12-05 NOTE — THERAPY
"Physical Therapy Evaluation completed.   Bed Mobility:  Supine to Sit: Modified Independent  Transfers: Sit to Stand: Modified Independent  Gait: Level Of Assist: Supervised with No Equipment Needed       Plan of Care: Patient with no further skilled PT needs in the acute care setting at this time  Discharge Recommendations: Equipment: No Equipment Needed. Post-acute therapy: Discharge to home with outpatient or home health for additional skilled therapy services.    Patient is a pleasant 59 YO male s/p anterior C3-6 discectomy, hardware removal, and fusion with Dr. Marie on 12/4. Patient ambulated approximately 150ft in unit without AD with supervision and ascended/descended one flight of stairs with supserivision. Provided patient education regarding cervical precautions, log roll, and pain management techniques, patient receptive to education and demonstrated understanding. Patient appears to be at baseline mobility, no further acute PT needs. Currently recommend outpatient PT following medical clearance.    See \"Rehab Therapy-Acute\" Patient Summary Report for complete documentation.     "

## 2018-12-05 NOTE — DISCHARGE INSTRUCTIONS
Ok to shower.  No NSAID's for 3 months.  Avoid repetitive lifting, pushing, pulling greater than 15 pounds. Follow up with [facility] in 2/6 weeks.  Call with questions.  Discharge Instructions    Discharged to home by car with relative. Discharged via wheelchair, hospital escort: Yes.  Special equipment needed: C-Collar    Be sure to schedule a follow-up appointment with your primary care doctor or any specialists as instructed.     Discharge Plan:   Diet Plan: Discussed  Activity Level: Discussed  Confirmed Follow up Appointment: Patient to Call and Schedule Appointment  Confirmed Symptoms Management: Discussed  Medication Reconciliation Updated: Yes  Influenza Vaccine Indication: Patient Refuses    I understand that a diet low in cholesterol, fat, and sodium is recommended for good health. Unless I have been given specific instructions below for another diet, I accept this instruction as my diet prescription.   Other diet: regular    Special Instructions: Lumbar Laminectomy and Anterior Cervical Fusion  Lumbar laminectomy and anterior cervical fusion are surgeries that are done on the spine to relieve pressure on the spinal cord or one or more nerve roots.  LET YOUR HEALTH CARE PROVIDER KNOW ABOUT:  · Any allergies you have.  · All medicines you are taking, including vitamins, herbs, eyedrops, creams, and over-the-counter medicines.  · Previous problems you or members of your family have had with the use of anesthetics.  · Any blood disorders or blood clotting problems you have.  · Previous surgeries you have had.  · Medical conditions you have had.  RISKS AND COMPLICATIONS  Generally, this is a safe procedure. However, as with any procedure, problems can occur. Possible problems include:  · Infection.  · Bleeding.  · Injury to surrounding structures.  · Leakage of cerebrospinal fluid.  BEFORE THE PROCEDURE  · Do not eat or drink for 6-8 hours before the procedure.  · Take medicines as directed by your surgeon.  Ask your surgeon about changing or stopping your regular medicines.  · You will be given antibiotic medicines to keep the infection rate down.  · The site of the cut (incision) will be marked.  · The incision site will be cleaned to reduce the risk of infection.  PROCEDURE  For both procedures, you will be given medicine to make you sleep (general anesthetic). A breathing tube will be placed down your throat.  Lumbar Laminectomy  · Your surgeon will make a 2-inch to 5-inch incision in your lower back (lumbar spine). The length of the incision will depend on how many spinal bones (vertebrae) are being repaired.  · The muscles in your back are moved away from the vertebrae and are pulled to the side.  · Pieces of the bony roof of the spinal canal (lamina) are removed. The lamina are removed to take the pressure off the affected nerves.  · The ligament that lies under the lamina and connects the vertebrae is removed. The remaining ligaments and a portion of the arthritic joints between the vertebrae are also removed (usually not completely) to take pressure off the nerves.  · Once excessive tightness or pressure is removed from the nerves, no additional ligaments and bone are removed.  · The back muscles are moved back into their normal position. The tissue under the skin is closed with absorbable, or dissolving, stitches. The skin is closed with stitches or staples.  · A dressing is put over the incision.    Anterior Cervical Fusion  · An anterior cervical fusion means that the surgery is done through the front (anterior) part of your neck. The incision is usually within a skin fold line on your neck.  · The neck muscles are pushed aside.  · The surgeon will remove the affected, degenerated disk and bone spurs (decompression). This helps to take the pressure off the nerves and spinal cord.  · The area where the disk was removed is then filled with a plastic spacer implant, bone graft, or both. These implants and bone  grafts take the place of the disk and keep the nerve passageway open and clear for the nerves and spinal cord.  · In most cases, the surgeon will put metal plates, pins, or screws (hardware) in the neck to help stabilize the surgical site and to keep the implants and bone grafts in place. The hardware reduces motion at the surgical site, so the bones can grow together. This provides extra support to the neck.  AFTER THE PROCEDURE  · You will stay in a recovery area until the anesthesia has worn off. Your blood pressure and pulse will be checked often.  · You may continue to receive fluids and medicines, such as antibiotics, through the IV tube for several days after the surgery.  · You may need to wear a neck or back brace for several weeks after surgery, especially when up and out of bed.  · You may be given pain medicine while still in the recovery area. Some pain is normal, but if your pain gets worse, tell your surgeon or nurse.  · Be up and moving as soon as possible after surgery. Physical therapists will help you start walking.  · To prevent blood clots in your legs:  ¨ You may be given compression stockings to wear.  ¨ You may need to take medicine to prevent clots.  · You may be asked to do breathing exercises. This is to prevent a lung infection.  · Most people stay in the hospital for 1-3 days after this surgery.     This information is not intended to replace advice given to you by your health care provider. Make sure you discuss any questions you have with your health care provider.     Document Released: 08/01/2005 Document Revised: 12/23/2014 Document Reviewed: 05/16/2014  [employer] Interactive Patient Education ©2016 [employer]    Anterior Cervical Fusion, Care After  Refer to this sheet in the next few weeks. These instructions provide you with information on caring for yourself after your procedure. Your health care provider may also give you more specific instructions. Your treatment has been  planned according to current medical practices, but problems sometimes occur. Call your health care provider if you have any problems or questions after your procedure.  WHAT TO EXPECT AFTER THE PROCEDURE  After your procedure, it is typical to have the following:   · Pain.  · Numbness.  · Weakness.  HOME CARE INSTRUCTIONS   It can take 6-12 months to recover fully from this procedure. Make sure to follow all of your health care provider's instructions carefully.   · Only take medicines as directed by your health care provider.  · You can eat what you usually do.  · Your activities will be limited for the first 3-4 months. In general:  ¨ It is fine to walk and climb stairs.  ¨ Do not lift anything weighing more than 10 lb (4.5 kg).  ¨ Do not lift objects over your head.  ¨ Do not drive until your health care provider says it is okay.  · Take showers instead of baths until directed by your health care provider.  · If you have to wear a cervical collar, take it off only as directed by your health care provider. You may be able to take it off to eat and shower.  · Change the bandage as directed by your health care provider.  · Return to your health care provider to have sutures or staples removed as directed by your health care provider.  · Follow your health care provider's instructions for physical therapy.  · You may apply ice to the repaired area:    ¨ Put ice in a plastic bag.  ¨ Place a towel between your skin and the bag.  ¨ Leave the ice on for 20 minutes, 2-3 times a day.    · Keep all follow-up appointments.  SEEK MEDICAL CARE IF:  · You have redness, swelling, or pain in your cut (incision) that is getting worse.  · You have pus coming from the incision.  · You have a fever.  · There is a bad smell coming from the incision or bandage.  · The edges of the incision break open after sutures or staples are taken out.  · Your pain medicine is not helping.  · You have swelling in your calf or leg.  · You seem to  be getting worse instead of better.    SEEK IMMEDIATE MEDICAL CARE IF:  · You develop shortness of breath or chest pain.  · You have trouble swallowing.  · You have pain, numbness, or weakness that is getting worse.     This information is not intended to replace advice given to you by your health care provider. Make sure you discuss any questions you have with your health care provider.     Document Released: 08/01/2005 Document Revised: 12/23/2014 Document Reviewed: 10/14/2014  [employer] Interactive Patient Education ©2016 [employer]      · Is patient discharged on Warfarin / Coumadin?   No     Depression / Suicide Risk    As you are discharged from this [facility] facility, it is important to learn how to keep safe from harming yourself.    Recognize the warning signs:  · Abrupt changes in personality, positive or negative- including increase in energy   · Giving away possessions  · Change in eating patterns- significant weight changes-  positive or negative  · Change in sleeping patterns- unable to sleep or sleeping all the time   · Unwillingness or inability to communicate  · Depression  · Unusual sadness, discouragement and loneliness  · Talk of wanting to die  · Neglect of personal appearance   · Rebelliousness- reckless behavior  · Withdrawal from people/activities they love  · Confusion- inability to concentrate     If you or a loved one observes any of these behaviors or has concerns about self-harm, here's what you can do:  · Talk about it- your feelings and reasons for harming yourself  · Remove any means that you might use to hurt yourself (examples: pills, rope, extension cords, firearm)  · Get professional help from the community (Mental Health, Substance Abuse, psychological counseling)  · Do not be alone:Call your Safe Contact- someone whom you trust who will be there for you.  · Call your local CRISIS HOTLINE 805-9828 or 261-082-3458  · Call your local Children's Mobile Crisis Response Team  [facility] (265) 785-9581 or www.SquadMail.Jascha  · Call the toll free National Suicide Prevention Hotlines   · National Suicide Prevention Lifeline 621-248-JYII (0204)  · National Hope Line Network 800-SUICIDE (894-1478)

## 2018-12-05 NOTE — PROGRESS NOTES
Neurosurgery Progress Note    Subjective:  POD#1 C3-6 ACDF.  Doing fine, denies bilateral UE pain/paresthesia.  C/o posterior neck pain.  Pain well controlled.    Swallowing and voice ok.  Drain with 25 cc output last 8 hours.      Exam:  Motor 5/5.  Dressing c/d/i.     BP  Min: 116/69  Max: 175/138  Pulse  Av.8  Min: 60  Max: 81  Resp  Av.7  Min: 10  Max: 19  Temp  Av.8 °C (98.2 °F)  Min: 36.3 °C (97.3 °F)  Max: 37.1 °C (98.8 °F)  SpO2  Av.9 %  Min: 93 %  Max: 100 %    No Data Recorded                      Intake/Output       18 - 18 - 1859      8405-3374 8452-4748 Total 1771-3386 5916-2772 Total       Intake    P.O.  580  -- 580  --  -- --    P.O. 580 -- 580 -- -- --    I.V.  2100  -- 2100  --  -- --    Crystalloid Intake 1500 -- 1500 -- -- --    Volume (mL) (dextrose 5 % and 0.45 % NaCl with KCl 20 mEq) 600 -- 600 -- -- --    Total Intake 2680 -- 2680 -- -- --       Output    Urine  425  -- 425  --  -- --    Number of Times Voided 1 x 2 x 3 x -- -- --    Urine Void (mL) 425 -- 425 -- -- --    Drains  20  75 95  --  -- --    Output (mL) (Closed/Suction Drain Medial Neck Hemovac) 20 75 95 -- -- --    Blood  50  -- 50  --  -- --    Est. Blood Loss (mL) 50 -- 50 -- -- --    Total Output 495 75 570 -- -- --       Net I/O     2185 -75 2110 -- -- --            Intake/Output Summary (Last 24 hours) at 18 0826  Last data filed at 18 0400   Gross per 24 hour   Intake             2680 ml   Output              570 ml   Net             2110 ml            • Pharmacy Consult Request  1 Each PRN   • MD ALERT...DO NOT ADMINISTER NSAIDS or ASPIRIN unless ORDERED By Neurosurgery  1 Each PRN   • docusate sodium  100 mg BID   • senna-docusate  1 Tab Nightly   • senna-docusate  1 Tab Q24HRS PRN   • polyethylene glycol/lytes  1 Packet BID PRN   • magnesium hydroxide  30 mL QDAY PRN   • bisacodyl  10 mg Q24HRS PRN   • fleet  1 Each Once PRN   • dextrose 5 % and  0.45 % NaCl with KCl 20 mEq   Continuous   • diphenhydrAMINE  25 mg Q6HRS PRN   • ondansetron  4 mg Q4HRS PRN   • ondansetron  4 mg Q4HRS PRN   • promethazine  12.5-25 mg Q4HRS PRN   • promethazine  12.5-25 mg Q4HRS PRN   • prochlorperazine  5-10 mg Q4HRS PRN   • methocarbamol  750 mg Q8HRS PRN   • ALPRAZolam  0.25 mg BID PRN   • hydrALAZINE  10 mg Q HOUR PRN   • benzocaine-menthol  1 Lozenge Q2HRS PRN   • calcium carbonate  500 mg BID   • HYDROcodone/acetaminophen  1-2 Tab Q4HRS PRN   • oxyCODONE-acetaminophen  1-2 Tab Q4HRS PRN   • cephALEXin  500 mg Q6HRS   • HYDROmorphone  1 mg Q3HRS PRN       Assessment and Plan:  Hospital day #2  POD #1  D/c hemovac.  D/c home later today.

## 2018-12-05 NOTE — THERAPY
"Occupational Therapy Evaluation completed.   Functional Status: Spv w/bed mobility, mod I w/LB dressing, walking in room and hallway no AD, spv w/collar management and grooming standing at sink. Pt reports good pain control and motivated to d/c home   Plan of Care: Patient with no further skilled OT needs in the acute care setting at this time  Discharge Recommendations:  Equipment: No Equipment Needed. Post-acute therapy Anticipate that the patient will have no further occupational therapy needs after discharge from the hospital.       See \"Rehab Therapy-Acute\" Patient Summary Report for complete documentation.    "

## 2018-12-05 NOTE — CARE PLAN
Problem: Communication  Goal: The ability to communicate needs accurately and effectively will improve  Outcome: PROGRESSING AS EXPECTED  Pt able to voice needs to staff, call light in use and in reach, hourly rounds, educated on POC, will continue to assess    Problem: Safety  Goal: Will remain free from injury  Outcome: PROGRESSING AS EXPECTED  Precautions in place, no injury noted, pt calls appropriately, call light in reach, bed alarmed, hourly rounding    Problem: Infection  Goal: Will remain free from infection  Outcome: PROGRESSING AS EXPECTED  VSS, Afebrile, AO4, breaths normal, UO WNL, incision CDI, drain output WNL.     Problem: Pain Management  Goal: Pain level will decrease to patient's comfort goal  Outcome: PROGRESSING AS EXPECTED

## 2018-12-06 NOTE — DISCHARGE SUMMARY
DATE OF ADMISSION:  12/04/2018    DATE OF DISCHARGE:  12/05/2018    DISCHARGE DIAGNOSES:  1.  Neck pain.  2.  Left upper extremity pain.    SURGERY PERFORMED:  C3-C6 anterior cervical diskectomy with fusion performed   by Dr. Placido Marie.    COMPLICATIONS:  None.    REASON FOR ADMISSION:  This is a pleasant 58-year-old male seen at the request   of Alisha Garay PA-C, for the above complaints.  He does give a   history of prior C6-C7 and C7-T1 ACDF performed elsewhere approximately 15   years ago.  He did well following that surgery, but for the past several   months has been developing progressive neck pain as well as left upper   extremity pain, paresthesia, and weakness.  His symptoms have been refractory   to conservative treatments, including physical therapy and his preoperative   workup showed postsurgical changes from C6-T1 with good evidence of   decompression through these levels.  There is degenerative disk disease with   disk bulging and facet hypertrophy as well as ligamentous hypertrophy at   C3-C4, C4-C5 and C5-C6 and the patient was hereby indicated for the above   surgery.    HOSPITAL COURSE:  The patient was admitted on the date of surgery.  He   underwent the above surgery without complication and was moved to the   neurosciences floor.  Here on the neurosciences floor, he made a good recovery   postoperatively.  Here on postoperative day #1, his pain is controlled with   oral medications.  He is ambulating.  He is voiding.  He is tolerating oral   food and medications well.  His motor exam is 5/5.  His drain has minimal   output.  He is eating and drinking well, swallowing fine and his voice is   fine.  He is hereby cleared for discharge home under stable condition after an   uneventful hospital stay.    DISCHARGE INSTRUCTIONS:  The patient is to eat a normal diet as tolerated.  He   is to walk as much as tolerated.  He is to avoid repetitive movements with   his arms above his shoulders.   He is to avoid lifting, pushing or pulling   greater than 15 pounds.  It will be okay for the patient to drive in 2 weeks'   time or when he is comfortable not taking narcotic pain medications.  He   should take an over-the-counter stool softener while taking narcotic pain   medications.  He is to avoid NSAIDs for 3 months.  He is encouraged to ice his   incision for 10-15 minutes multiple times per day.  He does have follow up   appointments in the office of [facility] in 2 and 6 weeks' time.  He is to   keep those appointments.  He should call our office or go to the nearest   emergency department with any emergent changes.  The patient was given these   discharge instructions verbally and he voiced understanding of them.    DISCHARGE MEDICATIONS:  In addition to the patient's normal home medications,   he will be discharged home on:  1.  Percocet 5/325 one to two tabs p.o. q. 4-6 hours p.r.n. pain, dispensed   #100, no refills.  2.  Robaxin 750 mg 1 tab p.o. t.i.d. p.r.n. spasm, dispensed #90, no refills.  3.  Keflex 500 mg 1 tab p.o. 4 times daily x5 days, dispensed #20, no refills.       ____________________________________     ANCELMO NUÑEZ / WEN    DD:  12/05/2018 08:35:57  DT:  12/06/2018 03:06:30    D#:  [id number]  Job#:  [id number]    cc: Mabel CASTRO, [facility] [place]

## 2018-12-10 ENCOUNTER — HOSPITAL ENCOUNTER (OUTPATIENT)
Dept: RADIOLOGY | Facility: MEDICAL CENTER | Age: 58
End: 2018-12-10

## 2018-12-17 NOTE — OP REPORT
DATE OF SERVICE:  12/04/2018    PREOPERATIVE DIAGNOSES:  1.  Left C5-C6 radiculopathies.  2.  C3-C6 cervical stenosis.  3.  Myelomalacia at C4-C5 level.  4.  Status post prior C6-T1 anterior cervical discectomy and fusion performed   elsewhere 15 years ago.  5.  Neck pain refractory to medical care.    POSTOPERATIVE DIAGNOSES:  1.  Left C5-C6 radiculopathies.  2.  C3-C6 cervical stenosis.  3.  Myelomalacia at C4-C5 level.  4.  Status post prior C6-T1 anterior cervical discectomy and fusion performed   elsewhere 15 years ago.  5.  Neck pain refractory to medical care.    PROCEDURES:  1.  Minimally invasive C3-C6 ACDF.  2.  C3-C4 anterior cervical diskectomy with bilateral foraminotomies.  3.  C4-C5 anterior cervical diskectomy with bilateral foraminotomies.  4.  C5-C6 anterior cervical diskectomy with bilateral foraminotomies.  5.  C3-C6 arthrodesis with figure-of-four WEB arthrodesis with local   morselized autograft.  6.  Insertion of titanium cages at 3 separate levels.    PROCEDURE:  1.  C3-C6 anterior cervical Zavation plating system.  2.  Exploration of prior fusion, which was solid.  3.  Modifier-22 for extra degree of difficulty dealing with prior scarring and   working around the prior plating adding an extra hour to the standard   surgical procedure.  4.  Microscope for microdissection of spinal canal.  5.  SSEPs, EMG monitoring, motor evoked potential monitoring and recurrent   laryngeal nerve monitoring performed by neuro monitoring associates, which   remained stable throughout.    SURGEON:  Placido Marie MD, neurosurgery and spine surgery.    ASSISTANT:  Sixto Bartlett PA-C    ANESTHESIA:  General endotracheal anesthesia.    ANESTHESIOLOGIST:  Ottoniel Vuong MD    COMPLICATIONS:  None.    ESTIMATED BLOOD LOSS:  Less than 20 mL    PREOPERATIVE NOTE:  This is an extremely pleasant 58-year-old male who   presents with neck pain and left upper extremity arm pain, weakness and   paresthesia  consistent with left C5-6 radiculopathy.  An MRI showed C3-C6   cervical stenosis with marked facet with marked compression of the spinal   cord.  There was evidence of myelomalacia at the C4-C5 level.  The patient had   undergone a prior C6-T1 fusion elsewhere 15 years ago and did well from that.    I discussed surgical procedure, alternatives, goals, risks and benefits,   complications in detail with the patient, used a bone model, MRI and   educational handouts to assist the patient with decision making, answered all   questions to the best of my ability.  Patient understood and consented to   surgery.  Details are well contained within the office visit notes.    NARRATIVE DICTATION:  The patient was brought to the operating room and placed   under general endotracheal anesthesia.  He was placed supine on the operating   table with care taken about the bony prominences, peripheral nerves.  His   neck was gently extended over gel pads and secured to the operating table.    Anterior cervical area was prepped and draped in usual sterile fashion.    Following localization with cross table fluoroscopy, a small transverse   cervical incision was made right of midline in a neck crease for cosmetic   purposes and the platysma was divided in vertical muscle splitting fashion.    Blunt and sharp dissection were used to identify the ventral cervical spine   between the carotid sheath and esophagus.  The longus colli muscles were swept   laterally and self-retaining retractors applied.  Intraoperative x-ray   confirmed appropriate levels.  Once excellent exposure was achieved between   C3-C6 level, I identified the prior plating and self-retaining mini retractors   applied.  I placed distractor pins in the bodies of C3, C4, C5 and C6 through   the prior fusion and the appropriate retractor was applied for distracting on   the disk space.  I incised the disk at each levels, removed the disk from the   endplates.  Disk spaces  were quite arthrodesed.  I drilled out the posterior   osteophytes.  Microscope was used for microdissection of spinal canal.    Complete diskectomy at C3-C4, C4-C5 and C5-C6 were completed.  Posterior   osteophytes removed.  Bilateral foraminotomies were completed.  Great care was   taken for microdissection of spinal canal and the microscope was used   throughout for all this decompression.  The thecal sac and nerve roots were   nicely freed up.  The wound was irrigated with bacitracin irrigation.    Hemostasis achieved.    I then templated free appropriate size cages.  The appropriate lordotic 4-web   Titanium cages were chosen and packed with local morcellized autograft and   Actifuse and then delivered under distraction between the body of C3-C4, C4-C5   and C5-C6 for an excellent A plus fit.  Anterior osteophytes were drilled   down and anterior cervical plate was secured over the body of C3 to C6 level   taking great care to avoid the prior plating and avoid the prior screws.    Appropriate screws were placed in C3, C4, C5 and C6 levels.  Locking mechanism   was engaged.  AP and lateral x-rays confirmed excellent position of the cages   and the plate.  I explored the prior fusion, which appeared to be solid.    Wound was irrigated with bacitracin irrigation.  Hemostasis achieved.  Wound   was then closed over a drain brought through a separate incision with 3-0   Vicryl to platysma, 3-0 Vicryl subcuticular with single Steri-Strips to skin.    Small sterile dressing was applied.  Swabs, needles, instruments correct by 2   count.  No complications were encountered.  Patient tolerated procedure, was   stable and transferred to recovery room.    INTRAOPERATIVE FINDINGS:  Stenosis at C3-C6 levels which was freed up and   stabilized.  No complications were encountered.  I explored the prior fusion,   which was solid.  A modifier-22 was required given the multilevels where   surgery is being performed up in a higher  level going through prior scarring   and working around the prior plating, which all added an extra hour to the   standard surgical procedure.    Patient will follow up at [facility] in 2   weeks and 6 weeks' time as arranged.       ____________________________________     MD KG CAMPBELL / WEN    DD:  12/16/2018 19:56:29  DT:  12/16/2018 21:05:23    D#:  [id number]  Job#:  [id number]    cc: Mabel CASTRO

## 2021-02-26 ENCOUNTER — HOSPITAL ENCOUNTER (OUTPATIENT)
Dept: LAB | Facility: MEDICAL CENTER | Age: 61
End: 2021-02-26
Attending: ANESTHESIOLOGY
Payer: COMMERCIAL

## 2021-02-26 LAB
COVID ORDER STATUS COVID19: NORMAL
SARS-COV-2 RNA RESP QL NAA+PROBE: NOTDETECTED
SPECIMEN SOURCE: NORMAL

## 2021-02-26 PROCEDURE — C9803 HOPD COVID-19 SPEC COLLECT: HCPCS

## 2021-02-26 PROCEDURE — U0005 INFEC AGEN DETEC AMPLI PROBE: HCPCS

## 2021-02-26 PROCEDURE — U0003 INFECTIOUS AGENT DETECTION BY NUCLEIC ACID (DNA OR RNA); SEVERE ACUTE RESPIRATORY SYNDROME CORONAVIRUS 2 (SARS-COV-2) (CORONAVIRUS DISEASE [COVID-19]), AMPLIFIED PROBE TECHNIQUE, MAKING USE OF HIGH THROUGHPUT TECHNOLOGIES AS DESCRIBED BY CMS-2020-01-R: HCPCS

## 2021-03-15 DIAGNOSIS — Z23 NEED FOR VACCINATION: ICD-10-CM

## 2021-03-20 ENCOUNTER — IMMUNIZATION (OUTPATIENT)
Dept: FAMILY PLANNING/WOMEN'S HEALTH CLINIC | Facility: IMMUNIZATION CENTER | Age: 61
End: 2021-03-20
Attending: INTERNAL MEDICINE
Payer: COMMERCIAL

## 2021-03-20 DIAGNOSIS — Z23 ENCOUNTER FOR VACCINATION: Primary | ICD-10-CM

## 2021-03-20 DIAGNOSIS — Z23 NEED FOR VACCINATION: ICD-10-CM

## 2021-03-20 PROCEDURE — 91300 PFIZER SARS-COV-2 VACCINE: CPT

## 2021-03-20 PROCEDURE — 0001A PFIZER SARS-COV-2 VACCINE: CPT

## 2021-04-09 ENCOUNTER — IMMUNIZATION (OUTPATIENT)
Dept: FAMILY PLANNING/WOMEN'S HEALTH CLINIC | Facility: IMMUNIZATION CENTER | Age: 61
End: 2021-04-09
Payer: COMMERCIAL

## 2021-04-09 DIAGNOSIS — Z23 ENCOUNTER FOR VACCINATION: Primary | ICD-10-CM

## 2021-04-09 PROCEDURE — 91300 PFIZER SARS-COV-2 VACCINE: CPT

## 2021-04-09 PROCEDURE — 0002A PFIZER SARS-COV-2 VACCINE: CPT

## 2021-12-02 ENCOUNTER — HOSPITAL ENCOUNTER (OUTPATIENT)
Dept: RADIOLOGY | Facility: MEDICAL CENTER | Age: 61
DRG: 519 | End: 2021-12-02
Attending: NEUROLOGICAL SURGERY | Admitting: NEUROLOGICAL SURGERY
Payer: COMMERCIAL

## 2021-12-02 ENCOUNTER — PRE-ADMISSION TESTING (OUTPATIENT)
Dept: ADMISSIONS | Facility: MEDICAL CENTER | Age: 61
DRG: 519 | End: 2021-12-02
Attending: NEUROLOGICAL SURGERY
Payer: COMMERCIAL

## 2021-12-02 DIAGNOSIS — M48.061 SPINAL STENOSIS, LUMBAR REGION, WITHOUT NEUROGENIC CLAUDICATION: ICD-10-CM

## 2021-12-02 DIAGNOSIS — Z01.811 PRE-OPERATIVE RESPIRATORY EXAMINATION: ICD-10-CM

## 2021-12-02 DIAGNOSIS — R82.90 NONSPECIFIC FINDING ON EXAMINATION OF URINE: ICD-10-CM

## 2021-12-02 DIAGNOSIS — Z01.810 PRE-OPERATIVE CARDIOVASCULAR EXAMINATION: ICD-10-CM

## 2021-12-02 DIAGNOSIS — Z01.812 PRE-OPERATIVE LABORATORY EXAMINATION: ICD-10-CM

## 2021-12-02 DIAGNOSIS — R94.31 NONSPECIFIC ABNORMAL ELECTROCARDIOGRAM (ECG) (EKG): ICD-10-CM

## 2021-12-02 DIAGNOSIS — M54.50 LOW BACK PAIN, UNSPECIFIED BACK PAIN LATERALITY, UNSPECIFIED CHRONICITY, UNSPECIFIED WHETHER SCIATICA PRESENT: ICD-10-CM

## 2021-12-02 DIAGNOSIS — R79.1 ABNORMAL COAGULATION PROFILE: ICD-10-CM

## 2021-12-02 LAB
ANION GAP SERPL CALC-SCNC: 8 MMOL/L (ref 7–16)
APPEARANCE UR: CLEAR
APTT PPP: 30.6 SEC (ref 24.7–36)
BASOPHILS # BLD AUTO: 0.4 % (ref 0–1.8)
BASOPHILS # BLD: 0.02 K/UL (ref 0–0.12)
BILIRUB UR QL STRIP.AUTO: NEGATIVE
BUN SERPL-MCNC: 18 MG/DL (ref 8–22)
CALCIUM SERPL-MCNC: 9.4 MG/DL (ref 8.5–10.5)
CHLORIDE SERPL-SCNC: 104 MMOL/L (ref 96–112)
CO2 SERPL-SCNC: 27 MMOL/L (ref 20–33)
COLOR UR: YELLOW
CREAT SERPL-MCNC: 1.07 MG/DL (ref 0.5–1.4)
EKG IMPRESSION: NORMAL
EOSINOPHIL # BLD AUTO: 0.14 K/UL (ref 0–0.51)
EOSINOPHIL NFR BLD: 2.7 % (ref 0–6.9)
ERYTHROCYTE [DISTWIDTH] IN BLOOD BY AUTOMATED COUNT: 45.6 FL (ref 35.9–50)
GLUCOSE SERPL-MCNC: 90 MG/DL (ref 65–99)
GLUCOSE UR STRIP.AUTO-MCNC: NEGATIVE MG/DL
HCT VFR BLD AUTO: 42.3 % (ref 42–52)
HGB BLD-MCNC: 14.2 G/DL (ref 14–18)
IMM GRANULOCYTES # BLD AUTO: 0.01 K/UL (ref 0–0.11)
IMM GRANULOCYTES NFR BLD AUTO: 0.2 % (ref 0–0.9)
INR PPP: 1.12 (ref 0.87–1.13)
KETONES UR STRIP.AUTO-MCNC: ABNORMAL MG/DL
LEUKOCYTE ESTERASE UR QL STRIP.AUTO: NEGATIVE
LYMPHOCYTES # BLD AUTO: 1.99 K/UL (ref 1–4.8)
LYMPHOCYTES NFR BLD: 38.3 % (ref 22–41)
MCH RBC QN AUTO: 32.2 PG (ref 27–33)
MCHC RBC AUTO-ENTMCNC: 33.6 G/DL (ref 33.7–35.3)
MCV RBC AUTO: 95.9 FL (ref 81.4–97.8)
MICRO URNS: ABNORMAL
MONOCYTES # BLD AUTO: 0.64 K/UL (ref 0–0.85)
MONOCYTES NFR BLD AUTO: 12.3 % (ref 0–13.4)
NEUTROPHILS # BLD AUTO: 2.39 K/UL (ref 1.82–7.42)
NEUTROPHILS NFR BLD: 46.1 % (ref 44–72)
NITRITE UR QL STRIP.AUTO: NEGATIVE
NRBC # BLD AUTO: 0 K/UL
NRBC BLD-RTO: 0 /100 WBC
PH UR STRIP.AUTO: 7 [PH] (ref 5–8)
PLATELET # BLD AUTO: 177 K/UL (ref 164–446)
PMV BLD AUTO: 10.1 FL (ref 9–12.9)
POTASSIUM SERPL-SCNC: 4.3 MMOL/L (ref 3.6–5.5)
PROT UR QL STRIP: NEGATIVE MG/DL
PROTHROMBIN TIME: 14.1 SEC (ref 12–14.6)
RBC # BLD AUTO: 4.41 M/UL (ref 4.7–6.1)
RBC UR QL AUTO: NEGATIVE
SARS-COV-2 RNA RESP QL NAA+PROBE: NOTDETECTED
SODIUM SERPL-SCNC: 139 MMOL/L (ref 135–145)
SP GR UR STRIP.AUTO: 1.03
SPECIMEN SOURCE: NORMAL
UROBILINOGEN UR STRIP.AUTO-MCNC: 1 MG/DL
WBC # BLD AUTO: 5.2 K/UL (ref 4.8–10.8)

## 2021-12-02 PROCEDURE — 71046 X-RAY EXAM CHEST 2 VIEWS: CPT

## 2021-12-02 PROCEDURE — 36415 COLL VENOUS BLD VENIPUNCTURE: CPT

## 2021-12-02 PROCEDURE — 80048 BASIC METABOLIC PNL TOTAL CA: CPT

## 2021-12-02 PROCEDURE — 93005 ELECTROCARDIOGRAM TRACING: CPT

## 2021-12-02 PROCEDURE — U0005 INFEC AGEN DETEC AMPLI PROBE: HCPCS

## 2021-12-02 PROCEDURE — 81003 URINALYSIS AUTO W/O SCOPE: CPT

## 2021-12-02 PROCEDURE — 85730 THROMBOPLASTIN TIME PARTIAL: CPT

## 2021-12-02 PROCEDURE — 72110 X-RAY EXAM L-2 SPINE 4/>VWS: CPT

## 2021-12-02 PROCEDURE — U0003 INFECTIOUS AGENT DETECTION BY NUCLEIC ACID (DNA OR RNA); SEVERE ACUTE RESPIRATORY SYNDROME CORONAVIRUS 2 (SARS-COV-2) (CORONAVIRUS DISEASE [COVID-19]), AMPLIFIED PROBE TECHNIQUE, MAKING USE OF HIGH THROUGHPUT TECHNOLOGIES AS DESCRIBED BY CMS-2020-01-R: HCPCS

## 2021-12-02 PROCEDURE — C9803 HOPD COVID-19 SPEC COLLECT: HCPCS

## 2021-12-02 PROCEDURE — 85025 COMPLETE CBC W/AUTO DIFF WBC: CPT

## 2021-12-02 PROCEDURE — 93010 ELECTROCARDIOGRAM REPORT: CPT | Performed by: INTERNAL MEDICINE

## 2021-12-02 PROCEDURE — 85610 PROTHROMBIN TIME: CPT

## 2021-12-05 ENCOUNTER — APPOINTMENT (OUTPATIENT)
Dept: RADIOLOGY | Facility: MEDICAL CENTER | Age: 61
DRG: 519 | End: 2021-12-05
Attending: NEUROLOGICAL SURGERY
Payer: COMMERCIAL

## 2021-12-05 ENCOUNTER — ANESTHESIA (OUTPATIENT)
Dept: SURGERY | Facility: MEDICAL CENTER | Age: 61
DRG: 519 | End: 2021-12-05
Payer: COMMERCIAL

## 2021-12-05 ENCOUNTER — APPOINTMENT (OUTPATIENT)
Dept: RADIOLOGY | Facility: MEDICAL CENTER | Age: 61
DRG: 519 | End: 2021-12-05
Attending: INTERNAL MEDICINE
Payer: COMMERCIAL

## 2021-12-05 ENCOUNTER — ANESTHESIA EVENT (OUTPATIENT)
Dept: SURGERY | Facility: MEDICAL CENTER | Age: 61
DRG: 519 | End: 2021-12-05
Payer: COMMERCIAL

## 2021-12-05 ENCOUNTER — HOSPITAL ENCOUNTER (INPATIENT)
Facility: MEDICAL CENTER | Age: 61
LOS: 1 days | DRG: 519 | End: 2021-12-08
Attending: NEUROLOGICAL SURGERY | Admitting: NEUROLOGICAL SURGERY
Payer: COMMERCIAL

## 2021-12-05 DIAGNOSIS — G89.18 PAIN, POSTOPERATIVE, ACUTE: ICD-10-CM

## 2021-12-05 DIAGNOSIS — M48.062 SPINAL STENOSIS OF LUMBAR REGION WITH NEUROGENIC CLAUDICATION: ICD-10-CM

## 2021-12-05 PROBLEM — M54.50 LOW BACK PAIN RADIATING TO RIGHT LEG: Status: ACTIVE | Noted: 2021-12-05

## 2021-12-05 PROBLEM — M79.604 LOW BACK PAIN RADIATING TO RIGHT LEG: Status: ACTIVE | Noted: 2021-12-05

## 2021-12-05 LAB
ALBUMIN SERPL BCP-MCNC: 4.2 G/DL (ref 3.2–4.9)
ALBUMIN/GLOB SERPL: 1.8 G/DL
ALP SERPL-CCNC: 58 U/L (ref 30–99)
ALT SERPL-CCNC: 16 U/L (ref 2–50)
ANION GAP SERPL CALC-SCNC: 11 MMOL/L (ref 7–16)
AST SERPL-CCNC: 19 U/L (ref 12–45)
BILIRUB SERPL-MCNC: 0.5 MG/DL (ref 0.1–1.5)
BUN SERPL-MCNC: 11 MG/DL (ref 8–22)
CALCIUM SERPL-MCNC: 9 MG/DL (ref 8.5–10.5)
CHLORIDE SERPL-SCNC: 103 MMOL/L (ref 96–112)
CO2 SERPL-SCNC: 26 MMOL/L (ref 20–33)
CREAT SERPL-MCNC: 1.03 MG/DL (ref 0.5–1.4)
EKG IMPRESSION: NORMAL
ERYTHROCYTE [DISTWIDTH] IN BLOOD BY AUTOMATED COUNT: 46.3 FL (ref 35.9–50)
GLOBULIN SER CALC-MCNC: 2.4 G/DL (ref 1.9–3.5)
GLUCOSE SERPL-MCNC: 149 MG/DL (ref 65–99)
HCT VFR BLD AUTO: 43.4 % (ref 42–52)
HGB BLD-MCNC: 13.9 G/DL (ref 14–18)
MAGNESIUM SERPL-MCNC: 1.7 MG/DL (ref 1.5–2.5)
MCH RBC QN AUTO: 31.2 PG (ref 27–33)
MCHC RBC AUTO-ENTMCNC: 32 G/DL (ref 33.7–35.3)
MCV RBC AUTO: 97.3 FL (ref 81.4–97.8)
PLATELET # BLD AUTO: 165 K/UL (ref 164–446)
PMV BLD AUTO: 9.6 FL (ref 9–12.9)
POTASSIUM SERPL-SCNC: 4.7 MMOL/L (ref 3.6–5.5)
PROT SERPL-MCNC: 6.6 G/DL (ref 6–8.2)
RBC # BLD AUTO: 4.46 M/UL (ref 4.7–6.1)
SODIUM SERPL-SCNC: 140 MMOL/L (ref 135–145)
TROPONIN T SERPL-MCNC: 11 NG/L (ref 6–19)
WBC # BLD AUTO: 8.5 K/UL (ref 4.8–10.8)

## 2021-12-05 PROCEDURE — 500112 HCHG BONE WAX: Performed by: NEUROLOGICAL SURGERY

## 2021-12-05 PROCEDURE — 700102 HCHG RX REV CODE 250 W/ 637 OVERRIDE(OP): Performed by: PHYSICIAN ASSISTANT

## 2021-12-05 PROCEDURE — 99204 OFFICE O/P NEW MOD 45 MIN: CPT | Performed by: INTERNAL MEDICINE

## 2021-12-05 PROCEDURE — 95938 SOMATOSENSORY TESTING: CPT | Performed by: NEUROLOGICAL SURGERY

## 2021-12-05 PROCEDURE — 93010 ELECTROCARDIOGRAM REPORT: CPT | Performed by: INTERNAL MEDICINE

## 2021-12-05 PROCEDURE — 01NB0ZZ RELEASE LUMBAR NERVE, OPEN APPROACH: ICD-10-PCS | Performed by: NEUROLOGICAL SURGERY

## 2021-12-05 PROCEDURE — 83735 ASSAY OF MAGNESIUM: CPT

## 2021-12-05 PROCEDURE — 700101 HCHG RX REV CODE 250: Performed by: PHYSICIAN ASSISTANT

## 2021-12-05 PROCEDURE — 36415 COLL VENOUS BLD VENIPUNCTURE: CPT

## 2021-12-05 PROCEDURE — 4A11X4G MONITORING OF PERIPHERAL NERVOUS ELECTRICAL ACTIVITY, INTRAOPERATIVE, EXTERNAL APPROACH: ICD-10-PCS | Performed by: NEUROLOGICAL SURGERY

## 2021-12-05 PROCEDURE — 500331 HCHG COTTONOID, SURG PATTIE: Performed by: NEUROLOGICAL SURGERY

## 2021-12-05 PROCEDURE — 700105 HCHG RX REV CODE 258: Performed by: ANESTHESIOLOGY

## 2021-12-05 PROCEDURE — A9270 NON-COVERED ITEM OR SERVICE: HCPCS | Performed by: INTERNAL MEDICINE

## 2021-12-05 PROCEDURE — 110454 HCHG SHELL REV 250: Performed by: NEUROLOGICAL SURGERY

## 2021-12-05 PROCEDURE — 700105 HCHG RX REV CODE 258: Performed by: NEUROLOGICAL SURGERY

## 2021-12-05 PROCEDURE — 500885 HCHG PACK, JACKSON TABLE: Performed by: NEUROLOGICAL SURGERY

## 2021-12-05 PROCEDURE — 700111 HCHG RX REV CODE 636 W/ 250 OVERRIDE (IP): Performed by: PHYSICIAN ASSISTANT

## 2021-12-05 PROCEDURE — 72020 X-RAY EXAM OF SPINE 1 VIEW: CPT

## 2021-12-05 PROCEDURE — 0SB20ZZ EXCISION OF LUMBAR VERTEBRAL DISC, OPEN APPROACH: ICD-10-PCS | Performed by: NEUROLOGICAL SURGERY

## 2021-12-05 PROCEDURE — A9270 NON-COVERED ITEM OR SERVICE: HCPCS | Performed by: PHYSICIAN ASSISTANT

## 2021-12-05 PROCEDURE — 160036 HCHG PACU - EA ADDL 30 MINS PHASE I: Performed by: NEUROLOGICAL SURGERY

## 2021-12-05 PROCEDURE — 93005 ELECTROCARDIOGRAM TRACING: CPT | Performed by: ANESTHESIOLOGY

## 2021-12-05 PROCEDURE — 700111 HCHG RX REV CODE 636 W/ 250 OVERRIDE (IP): Performed by: NEUROLOGICAL SURGERY

## 2021-12-05 PROCEDURE — 95940 IONM IN OPERATNG ROOM 15 MIN: CPT | Performed by: NEUROLOGICAL SURGERY

## 2021-12-05 PROCEDURE — 95861 NEEDLE EMG 2 EXTREMITIES: CPT | Performed by: NEUROLOGICAL SURGERY

## 2021-12-05 PROCEDURE — 700102 HCHG RX REV CODE 250 W/ 637 OVERRIDE(OP): Performed by: INTERNAL MEDICINE

## 2021-12-05 PROCEDURE — 501838 HCHG SUTURE GENERAL: Performed by: NEUROLOGICAL SURGERY

## 2021-12-05 PROCEDURE — 700101 HCHG RX REV CODE 250: Performed by: ANESTHESIOLOGY

## 2021-12-05 PROCEDURE — 71045 X-RAY EXAM CHEST 1 VIEW: CPT

## 2021-12-05 PROCEDURE — 160041 HCHG SURGERY MINUTES - EA ADDL 1 MIN LEVEL 4: Performed by: NEUROLOGICAL SURGERY

## 2021-12-05 PROCEDURE — 160035 HCHG PACU - 1ST 60 MINS PHASE I: Performed by: NEUROLOGICAL SURGERY

## 2021-12-05 PROCEDURE — 95955 EEG DURING SURGERY: CPT | Performed by: NEUROLOGICAL SURGERY

## 2021-12-05 PROCEDURE — 95937 NEUROMUSCULAR JUNCTION TEST: CPT | Performed by: NEUROLOGICAL SURGERY

## 2021-12-05 PROCEDURE — 700101 HCHG RX REV CODE 250: Performed by: NEUROLOGICAL SURGERY

## 2021-12-05 PROCEDURE — 700105 HCHG RX REV CODE 258: Performed by: PHYSICIAN ASSISTANT

## 2021-12-05 PROCEDURE — 85027 COMPLETE CBC AUTOMATED: CPT

## 2021-12-05 PROCEDURE — G0378 HOSPITAL OBSERVATION PER HR: HCPCS

## 2021-12-05 PROCEDURE — 84484 ASSAY OF TROPONIN QUANT: CPT

## 2021-12-05 PROCEDURE — 160002 HCHG RECOVERY MINUTES (STAT): Performed by: NEUROLOGICAL SURGERY

## 2021-12-05 PROCEDURE — 160048 HCHG OR STATISTICAL LEVEL 1-5: Performed by: NEUROLOGICAL SURGERY

## 2021-12-05 PROCEDURE — 160009 HCHG ANES TIME/MIN: Performed by: NEUROLOGICAL SURGERY

## 2021-12-05 PROCEDURE — 700101 HCHG RX REV CODE 250

## 2021-12-05 PROCEDURE — 80053 COMPREHEN METABOLIC PANEL: CPT

## 2021-12-05 PROCEDURE — 160029 HCHG SURGERY MINUTES - 1ST 30 MINS LEVEL 4: Performed by: NEUROLOGICAL SURGERY

## 2021-12-05 PROCEDURE — 700111 HCHG RX REV CODE 636 W/ 250 OVERRIDE (IP): Performed by: ANESTHESIOLOGY

## 2021-12-05 PROCEDURE — 500375 HCHG DRAIN, J-P ROUND 10FR: Performed by: NEUROLOGICAL SURGERY

## 2021-12-05 RX ORDER — CEFAZOLIN SODIUM 2 G/100ML
2 INJECTION, SOLUTION INTRAVENOUS EVERY 8 HOURS
Status: COMPLETED | OUTPATIENT
Start: 2021-12-05 | End: 2021-12-06

## 2021-12-05 RX ORDER — ONDANSETRON 4 MG/1
4 TABLET, ORALLY DISINTEGRATING ORAL EVERY 4 HOURS PRN
Status: DISCONTINUED | OUTPATIENT
Start: 2021-12-05 | End: 2021-12-08 | Stop reason: HOSPADM

## 2021-12-05 RX ORDER — DIPHENHYDRAMINE HCL 25 MG
25 TABLET ORAL EVERY 6 HOURS PRN
Status: DISCONTINUED | OUTPATIENT
Start: 2021-12-05 | End: 2021-12-08 | Stop reason: HOSPADM

## 2021-12-05 RX ORDER — DOCUSATE SODIUM 100 MG/1
100 CAPSULE, LIQUID FILLED ORAL 2 TIMES DAILY
Status: DISCONTINUED | OUTPATIENT
Start: 2021-12-05 | End: 2021-12-08 | Stop reason: HOSPADM

## 2021-12-05 RX ORDER — DILTIAZEM HYDROCHLORIDE 60 MG/1
60 TABLET, FILM COATED ORAL ONCE
Status: COMPLETED | OUTPATIENT
Start: 2021-12-05 | End: 2021-12-05

## 2021-12-05 RX ORDER — METOPROLOL TARTRATE 1 MG/ML
INJECTION, SOLUTION INTRAVENOUS
Status: COMPLETED
Start: 2021-12-05 | End: 2021-12-05

## 2021-12-05 RX ORDER — DEXAMETHASONE SODIUM PHOSPHATE 4 MG/ML
INJECTION, SOLUTION INTRA-ARTICULAR; INTRALESIONAL; INTRAMUSCULAR; INTRAVENOUS; SOFT TISSUE PRN
Status: DISCONTINUED | OUTPATIENT
Start: 2021-12-05 | End: 2021-12-05 | Stop reason: SURG

## 2021-12-05 RX ORDER — ONDANSETRON 2 MG/ML
4 INJECTION INTRAMUSCULAR; INTRAVENOUS EVERY 4 HOURS PRN
Status: DISCONTINUED | OUTPATIENT
Start: 2021-12-05 | End: 2021-12-08 | Stop reason: HOSPADM

## 2021-12-05 RX ORDER — PROMETHAZINE HYDROCHLORIDE 25 MG/1
12.5-25 TABLET ORAL EVERY 4 HOURS PRN
Status: DISCONTINUED | OUTPATIENT
Start: 2021-12-05 | End: 2021-12-08 | Stop reason: HOSPADM

## 2021-12-05 RX ORDER — HYDROMORPHONE HYDROCHLORIDE 1 MG/ML
0.1 INJECTION, SOLUTION INTRAMUSCULAR; INTRAVENOUS; SUBCUTANEOUS
Status: DISCONTINUED | OUTPATIENT
Start: 2021-12-05 | End: 2021-12-05 | Stop reason: HOSPADM

## 2021-12-05 RX ORDER — ONDANSETRON 2 MG/ML
4 INJECTION INTRAMUSCULAR; INTRAVENOUS
Status: DISCONTINUED | OUTPATIENT
Start: 2021-12-05 | End: 2021-12-05 | Stop reason: HOSPADM

## 2021-12-05 RX ORDER — MEPERIDINE HYDROCHLORIDE 25 MG/ML
12.5 INJECTION INTRAMUSCULAR; INTRAVENOUS; SUBCUTANEOUS
Status: DISCONTINUED | OUTPATIENT
Start: 2021-12-05 | End: 2021-12-05 | Stop reason: HOSPADM

## 2021-12-05 RX ORDER — DIPHENHYDRAMINE HYDROCHLORIDE 50 MG/ML
25 INJECTION INTRAMUSCULAR; INTRAVENOUS EVERY 6 HOURS PRN
Status: DISCONTINUED | OUTPATIENT
Start: 2021-12-05 | End: 2021-12-08 | Stop reason: HOSPADM

## 2021-12-05 RX ORDER — CEFAZOLIN SODIUM 1 G/3ML
INJECTION, POWDER, FOR SOLUTION INTRAMUSCULAR; INTRAVENOUS
Status: DISCONTINUED | OUTPATIENT
Start: 2021-12-05 | End: 2021-12-05 | Stop reason: HOSPADM

## 2021-12-05 RX ORDER — BUPIVACAINE HYDROCHLORIDE AND EPINEPHRINE 5; 5 MG/ML; UG/ML
INJECTION, SOLUTION EPIDURAL; INTRACAUDAL; PERINEURAL
Status: DISCONTINUED | OUTPATIENT
Start: 2021-12-05 | End: 2021-12-05 | Stop reason: HOSPADM

## 2021-12-05 RX ORDER — OXYCODONE HYDROCHLORIDE 5 MG/1
5 TABLET ORAL EVERY 4 HOURS PRN
Status: DISCONTINUED | OUTPATIENT
Start: 2021-12-05 | End: 2021-12-08 | Stop reason: HOSPADM

## 2021-12-05 RX ORDER — DIPHENHYDRAMINE HYDROCHLORIDE 50 MG/ML
12.5 INJECTION INTRAMUSCULAR; INTRAVENOUS
Status: DISCONTINUED | OUTPATIENT
Start: 2021-12-05 | End: 2021-12-05 | Stop reason: HOSPADM

## 2021-12-05 RX ORDER — HALOPERIDOL 5 MG/ML
1 INJECTION INTRAMUSCULAR
Status: DISCONTINUED | OUTPATIENT
Start: 2021-12-05 | End: 2021-12-05 | Stop reason: HOSPADM

## 2021-12-05 RX ORDER — OXYCODONE HCL 5 MG/5 ML
10 SOLUTION, ORAL ORAL
Status: DISCONTINUED | OUTPATIENT
Start: 2021-12-05 | End: 2021-12-05 | Stop reason: HOSPADM

## 2021-12-05 RX ORDER — LABETALOL HYDROCHLORIDE 5 MG/ML
10 INJECTION, SOLUTION INTRAVENOUS
Status: DISCONTINUED | OUTPATIENT
Start: 2021-12-05 | End: 2021-12-08 | Stop reason: HOSPADM

## 2021-12-05 RX ORDER — SODIUM CHLORIDE, SODIUM LACTATE, POTASSIUM CHLORIDE, CALCIUM CHLORIDE 600; 310; 30; 20 MG/100ML; MG/100ML; MG/100ML; MG/100ML
INJECTION, SOLUTION INTRAVENOUS
Status: DISCONTINUED | OUTPATIENT
Start: 2021-12-05 | End: 2021-12-05 | Stop reason: SURG

## 2021-12-05 RX ORDER — LIDOCAINE HYDROCHLORIDE 20 MG/ML
INJECTION, SOLUTION EPIDURAL; INFILTRATION; INTRACAUDAL; PERINEURAL PRN
Status: DISCONTINUED | OUTPATIENT
Start: 2021-12-05 | End: 2021-12-05 | Stop reason: SURG

## 2021-12-05 RX ORDER — MIDAZOLAM HYDROCHLORIDE 1 MG/ML
INJECTION INTRAMUSCULAR; INTRAVENOUS PRN
Status: DISCONTINUED | OUTPATIENT
Start: 2021-12-05 | End: 2021-12-05 | Stop reason: SURG

## 2021-12-05 RX ORDER — ALPRAZOLAM 0.25 MG/1
0.25 TABLET ORAL 2 TIMES DAILY PRN
Status: DISCONTINUED | OUTPATIENT
Start: 2021-12-05 | End: 2021-12-08 | Stop reason: HOSPADM

## 2021-12-05 RX ORDER — AMOXICILLIN 250 MG
1 CAPSULE ORAL NIGHTLY
Status: DISCONTINUED | OUTPATIENT
Start: 2021-12-05 | End: 2021-12-08 | Stop reason: HOSPADM

## 2021-12-05 RX ORDER — BISACODYL 10 MG
10 SUPPOSITORY, RECTAL RECTAL
Status: DISCONTINUED | OUTPATIENT
Start: 2021-12-05 | End: 2021-12-08 | Stop reason: HOSPADM

## 2021-12-05 RX ORDER — ENEMA 19; 7 G/133ML; G/133ML
1 ENEMA RECTAL
Status: DISCONTINUED | OUTPATIENT
Start: 2021-12-05 | End: 2021-12-08 | Stop reason: HOSPADM

## 2021-12-05 RX ORDER — HYDROMORPHONE HYDROCHLORIDE 1 MG/ML
0.5 INJECTION, SOLUTION INTRAMUSCULAR; INTRAVENOUS; SUBCUTANEOUS
Status: DISCONTINUED | OUTPATIENT
Start: 2021-12-05 | End: 2021-12-08 | Stop reason: HOSPADM

## 2021-12-05 RX ORDER — PROMETHAZINE HYDROCHLORIDE 25 MG/1
12.5-25 SUPPOSITORY RECTAL EVERY 4 HOURS PRN
Status: DISCONTINUED | OUTPATIENT
Start: 2021-12-05 | End: 2021-12-08 | Stop reason: HOSPADM

## 2021-12-05 RX ORDER — HYDROMORPHONE HYDROCHLORIDE 1 MG/ML
0.2 INJECTION, SOLUTION INTRAMUSCULAR; INTRAVENOUS; SUBCUTANEOUS
Status: DISCONTINUED | OUTPATIENT
Start: 2021-12-05 | End: 2021-12-05 | Stop reason: HOSPADM

## 2021-12-05 RX ORDER — AMOXICILLIN 250 MG
1 CAPSULE ORAL
Status: DISCONTINUED | OUTPATIENT
Start: 2021-12-05 | End: 2021-12-08 | Stop reason: HOSPADM

## 2021-12-05 RX ORDER — OXYCODONE HCL 5 MG/5 ML
5 SOLUTION, ORAL ORAL
Status: DISCONTINUED | OUTPATIENT
Start: 2021-12-05 | End: 2021-12-05 | Stop reason: HOSPADM

## 2021-12-05 RX ORDER — METHOCARBAMOL 500 MG/1
750 TABLET, FILM COATED ORAL EVERY 8 HOURS PRN
Status: DISCONTINUED | OUTPATIENT
Start: 2021-12-05 | End: 2021-12-08 | Stop reason: HOSPADM

## 2021-12-05 RX ORDER — SODIUM CHLORIDE, SODIUM LACTATE, POTASSIUM CHLORIDE, CALCIUM CHLORIDE 600; 310; 30; 20 MG/100ML; MG/100ML; MG/100ML; MG/100ML
INJECTION, SOLUTION INTRAVENOUS CONTINUOUS
Status: ACTIVE | OUTPATIENT
Start: 2021-12-05 | End: 2021-12-05

## 2021-12-05 RX ORDER — METOPROLOL TARTRATE 1 MG/ML
5 INJECTION, SOLUTION INTRAVENOUS
Status: DISCONTINUED | OUTPATIENT
Start: 2021-12-05 | End: 2021-12-05 | Stop reason: HOSPADM

## 2021-12-05 RX ORDER — SUCCINYLCHOLINE CHLORIDE 20 MG/ML
INJECTION INTRAMUSCULAR; INTRAVENOUS PRN
Status: DISCONTINUED | OUTPATIENT
Start: 2021-12-05 | End: 2021-12-05 | Stop reason: SURG

## 2021-12-05 RX ORDER — HYDROMORPHONE HYDROCHLORIDE 1 MG/ML
0.4 INJECTION, SOLUTION INTRAMUSCULAR; INTRAVENOUS; SUBCUTANEOUS
Status: DISCONTINUED | OUTPATIENT
Start: 2021-12-05 | End: 2021-12-05 | Stop reason: HOSPADM

## 2021-12-05 RX ORDER — ONDANSETRON 2 MG/ML
INJECTION INTRAMUSCULAR; INTRAVENOUS PRN
Status: DISCONTINUED | OUTPATIENT
Start: 2021-12-05 | End: 2021-12-05 | Stop reason: SURG

## 2021-12-05 RX ORDER — HYDROMORPHONE HYDROCHLORIDE 2 MG/ML
INJECTION, SOLUTION INTRAMUSCULAR; INTRAVENOUS; SUBCUTANEOUS PRN
Status: DISCONTINUED | OUTPATIENT
Start: 2021-12-05 | End: 2021-12-05 | Stop reason: SURG

## 2021-12-05 RX ORDER — CALCIUM CARBONATE 500 MG/1
500 TABLET, CHEWABLE ORAL 2 TIMES DAILY
Status: DISCONTINUED | OUTPATIENT
Start: 2021-12-05 | End: 2021-12-08 | Stop reason: HOSPADM

## 2021-12-05 RX ORDER — VANCOMYCIN HYDROCHLORIDE 1 G/20ML
INJECTION, POWDER, LYOPHILIZED, FOR SOLUTION INTRAVENOUS
Status: COMPLETED | OUTPATIENT
Start: 2021-12-05 | End: 2021-12-05

## 2021-12-05 RX ORDER — POLYETHYLENE GLYCOL 3350 17 G/17G
1 POWDER, FOR SOLUTION ORAL 2 TIMES DAILY PRN
Status: DISCONTINUED | OUTPATIENT
Start: 2021-12-05 | End: 2021-12-08 | Stop reason: HOSPADM

## 2021-12-05 RX ORDER — HYDROCODONE BITARTRATE AND ACETAMINOPHEN 10; 325 MG/1; MG/1
1 TABLET ORAL EVERY 4 HOURS PRN
Status: DISCONTINUED | OUTPATIENT
Start: 2021-12-05 | End: 2021-12-08 | Stop reason: HOSPADM

## 2021-12-05 RX ORDER — CEFAZOLIN SODIUM 1 G/3ML
INJECTION, POWDER, FOR SOLUTION INTRAMUSCULAR; INTRAVENOUS PRN
Status: DISCONTINUED | OUTPATIENT
Start: 2021-12-05 | End: 2021-12-05 | Stop reason: SURG

## 2021-12-05 RX ORDER — SODIUM CHLORIDE, SODIUM LACTATE, POTASSIUM CHLORIDE, CALCIUM CHLORIDE 600; 310; 30; 20 MG/100ML; MG/100ML; MG/100ML; MG/100ML
INJECTION, SOLUTION INTRAVENOUS CONTINUOUS
Status: DISCONTINUED | OUTPATIENT
Start: 2021-12-05 | End: 2021-12-05 | Stop reason: HOSPADM

## 2021-12-05 RX ORDER — PROCHLORPERAZINE EDISYLATE 5 MG/ML
5-10 INJECTION INTRAMUSCULAR; INTRAVENOUS EVERY 4 HOURS PRN
Status: DISCONTINUED | OUTPATIENT
Start: 2021-12-05 | End: 2021-12-08 | Stop reason: HOSPADM

## 2021-12-05 RX ORDER — DEXTROSE MONOHYDRATE, SODIUM CHLORIDE, AND POTASSIUM CHLORIDE 50; 1.49; 4.5 G/1000ML; G/1000ML; G/1000ML
INJECTION, SOLUTION INTRAVENOUS CONTINUOUS
Status: DISCONTINUED | OUTPATIENT
Start: 2021-12-05 | End: 2021-12-06

## 2021-12-05 RX ADMIN — EPHEDRINE SULFATE 10 MG: 50 INJECTION, SOLUTION INTRAVENOUS at 13:21

## 2021-12-05 RX ADMIN — FENTANYL CITRATE 50 MCG: 50 INJECTION, SOLUTION INTRAMUSCULAR; INTRAVENOUS at 13:55

## 2021-12-05 RX ADMIN — CEFAZOLIN 2 G: 330 INJECTION, POWDER, FOR SOLUTION INTRAMUSCULAR; INTRAVENOUS at 12:18

## 2021-12-05 RX ADMIN — EPHEDRINE SULFATE 10 MG: 50 INJECTION, SOLUTION INTRAVENOUS at 13:13

## 2021-12-05 RX ADMIN — LIDOCAINE HYDROCHLORIDE 100 MG: 20 INJECTION, SOLUTION EPIDURAL; INFILTRATION; INTRACAUDAL at 12:18

## 2021-12-05 RX ADMIN — FENTANYL CITRATE 25 MCG: 50 INJECTION, SOLUTION INTRAMUSCULAR; INTRAVENOUS at 16:39

## 2021-12-05 RX ADMIN — FENTANYL CITRATE 50 MCG: 50 INJECTION, SOLUTION INTRAMUSCULAR; INTRAVENOUS at 12:35

## 2021-12-05 RX ADMIN — FENTANYL CITRATE 25 MCG: 50 INJECTION, SOLUTION INTRAMUSCULAR; INTRAVENOUS at 16:46

## 2021-12-05 RX ADMIN — HYDROMORPHONE HYDROCHLORIDE 1 MG: 2 INJECTION, SOLUTION INTRAMUSCULAR; INTRAVENOUS; SUBCUTANEOUS at 12:33

## 2021-12-05 RX ADMIN — SODIUM CHLORIDE, POTASSIUM CHLORIDE, SODIUM LACTATE AND CALCIUM CHLORIDE: 600; 310; 30; 20 INJECTION, SOLUTION INTRAVENOUS at 09:56

## 2021-12-05 RX ADMIN — ONDANSETRON 4 MG: 2 INJECTION INTRAMUSCULAR; INTRAVENOUS at 12:18

## 2021-12-05 RX ADMIN — MIDAZOLAM HYDROCHLORIDE 2 MG: 1 INJECTION, SOLUTION INTRAMUSCULAR; INTRAVENOUS at 12:18

## 2021-12-05 RX ADMIN — CALCIUM CARBONATE 500 MG: 500 TABLET, CHEWABLE ORAL at 20:42

## 2021-12-05 RX ADMIN — POTASSIUM CHLORIDE, DEXTROSE MONOHYDRATE AND SODIUM CHLORIDE: 150; 5; 450 INJECTION, SOLUTION INTRAVENOUS at 20:42

## 2021-12-05 RX ADMIN — FENTANYL CITRATE 50 MCG: 50 INJECTION, SOLUTION INTRAMUSCULAR; INTRAVENOUS at 14:14

## 2021-12-05 RX ADMIN — DEXAMETHASONE SODIUM PHOSPHATE 4 MG: 4 INJECTION, SOLUTION INTRA-ARTICULAR; INTRALESIONAL; INTRAMUSCULAR; INTRAVENOUS; SOFT TISSUE at 12:18

## 2021-12-05 RX ADMIN — METOROPROLOL TARTRATE 5 MG: 5 INJECTION, SOLUTION INTRAVENOUS at 17:26

## 2021-12-05 RX ADMIN — METOPROLOL TARTRATE 5 MG: 5 INJECTION, SOLUTION INTRAVENOUS at 19:00

## 2021-12-05 RX ADMIN — PROPOFOL 200 MG: 10 INJECTION, EMULSION INTRAVENOUS at 12:18

## 2021-12-05 RX ADMIN — SODIUM CHLORIDE, POTASSIUM CHLORIDE, SODIUM LACTATE AND CALCIUM CHLORIDE: 600; 310; 30; 20 INJECTION, SOLUTION INTRAVENOUS at 12:18

## 2021-12-05 RX ADMIN — DEXTROSE MONOHYDRATE 2 G: 50 INJECTION, SOLUTION INTRAVENOUS at 20:43

## 2021-12-05 RX ADMIN — SUCCINYLCHOLINE CHLORIDE 100 MG: 20 INJECTION, SOLUTION INTRAMUSCULAR; INTRAVENOUS; PARENTERAL at 12:18

## 2021-12-05 RX ADMIN — DILTIAZEM HYDROCHLORIDE 60 MG: 60 TABLET, FILM COATED ORAL at 19:26

## 2021-12-05 RX ADMIN — METOPROLOL TARTRATE 5 MG: 1 INJECTION, SOLUTION INTRAVENOUS at 19:00

## 2021-12-05 RX ADMIN — LABETALOL HYDROCHLORIDE 10 MG: 5 INJECTION, SOLUTION INTRAVENOUS at 20:13

## 2021-12-05 ASSESSMENT — ENCOUNTER SYMPTOMS
PALPITATIONS: 0
STRIDOR: 0
BRUISES/BLEEDS EASILY: 0
VOMITING: 0
BLURRED VISION: 0
DOUBLE VISION: 0
COUGH: 0
INSOMNIA: 0
HEMOPTYSIS: 0
MYALGIAS: 0
WEIGHT LOSS: 0
DEPRESSION: 0
DIZZINESS: 0
NAUSEA: 0
NECK PAIN: 0
HEADACHES: 0
SORE THROAT: 0
FEVER: 0

## 2021-12-05 ASSESSMENT — PAIN DESCRIPTION - PAIN TYPE
TYPE: ACUTE PAIN
TYPE: ACUTE PAIN

## 2021-12-05 NOTE — ANESTHESIA PROCEDURE NOTES
Airway    Date/Time: 12/5/2021 12:19 PM  Performed by: Tobey Gansert, M.D.  Authorized by: Tobey Gansert, M.D.     Location:  OR  Urgency:  Elective  Indications for Airway Management:  Anesthesia      Spontaneous Ventilation: absent    Sedation Level:  Deep  Preoxygenated: Yes    Patient Position:  Sniffing  Final Airway Type:  Endotracheal airway  Final Endotracheal Airway:  ETT  Cuffed: Yes    Technique Used for Successful ETT Placement:  Direct laryngoscopy    Insertion Site:  Oral  Blade Type:  Nicole  Laryngoscope Blade/Videolaryngoscope Blade Size:  3  ETT Size (mm):  8.0  Measured from:  Teeth  ETT to Teeth (cm):  23  Placement Verified by: auscultation and capnometry    Cormack-Lehane Classification:  Grade I - full view of glottis  Number of Attempts at Approach:  1

## 2021-12-05 NOTE — ANESTHESIA TIME REPORT
Anesthesia Start and Stop Event Times     Date Time Event    12/5/2021 1139 Ready for Procedure     1213 Anesthesia Start     1511 Anesthesia Stop        Responsible Staff  12/05/21    Name Role Begin End    Tobey Gansert, M.D. Anesth 1213 1511        Preop Diagnosis (Free Text):  Pre-op Diagnosis     LOW BACK PAIN, LUMBAR STENOSIS        Preop Diagnosis (Codes):  Diagnosis Information     Diagnosis Code(s): Low back pain [M54.50]     Lumbar stenosis [M48.061]        Premium Reason  E. Weekend    Comments:

## 2021-12-05 NOTE — ANESTHESIA POSTPROCEDURE EVALUATION
Patient: Edgar Jackson    Procedure Summary     Date: 12/05/21 Room / Location: [facility] 07 / SURGERY [facility]    Anesthesia Start: 1213 Anesthesia Stop: 1511    Procedure: LAMINECTOMY, SPINE, LUMBAR, WITH DISCECTOMY - L2-5 (N/A Back) Diagnosis:       Low back pain      Lumbar stenosis      (LOW BACK PAIN, LUMBAR STENOSIS)    Surgeons: Placido Marie M.D. Responsible Provider: Tobey Gansert, M.D.    Anesthesia Type: general ASA Status: 2          Final Anesthesia Type: general  Last vitals  BP   Blood Pressure: 136/84    Temp   36.5 °C (97.7 °F)    Pulse   95   Resp   16    SpO2   99 %      Anesthesia Post Evaluation    Patient location during evaluation: PACU  Patient participation: complete - patient participated  Level of consciousness: awake and alert    Airway patency: patent  Anesthetic complications: no  Cardiovascular status: hemodynamically stable  Respiratory status: acceptable  Hydration status: euvolemic    PONV: none          No complications documented.     Nurse Pain Score: 0 (NPRS)

## 2021-12-05 NOTE — ANESTHESIA PREPROCEDURE EVALUATION
Case: [id number] Date/Time: 12/05/21 1130    Procedure: LAMINECTOMY, SPINE, LUMBAR, WITH DISCECTOMY - L2-5 (N/A Back)    Anesthesia type: General    Diagnosis:       Low back pain [M54.50]      Lumbar stenosis [M48.061]    Pre-op diagnosis: LOW BACK PAIN, LUMBAR STENOSIS    Location: [place] [address] / SURGERY [facility]    Surgeons: Placido Marie M.D.          Relevant Problems   CARDIAC   (positive) Atrial flutter (HCC)   (positive) HTN (hypertension)       Physical Exam    Airway   Mallampati: II  TM distance: >3 FB  Neck ROM: full       Cardiovascular - normal exam  Rhythm: regular  Rate: normal  (-) murmur     Dental - normal exam           Pulmonary - normal exam  Breath sounds clear to auscultation     Abdominal    Neurological - normal exam                 Anesthesia Plan    ASA 2       Plan - general       Airway plan will be ETT          Induction: intravenous    Postoperative Plan: Postoperative administration of opioids is intended.    Pertinent diagnostic labs and testing reviewed    Informed Consent:    Anesthetic plan and risks discussed with patient.    Use of blood products discussed with: patient whom consented to blood products.

## 2021-12-05 NOTE — OR SURGEON
Immediate Post OP Note    PreOp Diagnosis: L2-5 DDD, right radiculopathy.       PostOp Diagnosis: Same.       Procedure(s):  LAMINECTOMY, SPINE, LUMBAR, WITH DISCECTOMY - L2-5 - Wound Class: Clean with Drain    Surgeon(s):  Placido Marie M.D.    Anesthesiologist/Type of Anesthesia:  Anesthesiologist: Tobey Gansert, M.D./General    Surgical Staff:  Assistant: Sixto Bartlett P.A.-C.  Circulator: DESIRE Young Circulator: DESIRE Faustin Scrub: Sarah Tapia  Scrub Person: Balbir Plascencia  Radiology Technologist: Louie Barker    Specimens removed if any:  * No specimens in log *    Estimated Blood Loss: 100 cc     Findings: L2-5 DDD, see dictation.     Complications: None.          12/5/2021 3:19 PM Sixto Bartlett P.A.-C.

## 2021-12-06 PROBLEM — M48.062 SPINAL STENOSIS OF LUMBAR REGION WITH NEUROGENIC CLAUDICATION: Status: ACTIVE | Noted: 2021-12-05

## 2021-12-06 LAB — GLUCOSE BLD-MCNC: 146 MG/DL (ref 65–99)

## 2021-12-06 PROCEDURE — 700102 HCHG RX REV CODE 250 W/ 637 OVERRIDE(OP): Performed by: PHYSICIAN ASSISTANT

## 2021-12-06 PROCEDURE — 700111 HCHG RX REV CODE 636 W/ 250 OVERRIDE (IP): Performed by: PHYSICIAN ASSISTANT

## 2021-12-06 PROCEDURE — 82962 GLUCOSE BLOOD TEST: CPT

## 2021-12-06 PROCEDURE — 700101 HCHG RX REV CODE 250: Performed by: PHYSICIAN ASSISTANT

## 2021-12-06 PROCEDURE — A9270 NON-COVERED ITEM OR SERVICE: HCPCS | Performed by: PHYSICIAN ASSISTANT

## 2021-12-06 PROCEDURE — 700102 HCHG RX REV CODE 250 W/ 637 OVERRIDE(OP): Performed by: INTERNAL MEDICINE

## 2021-12-06 PROCEDURE — 97165 OT EVAL LOW COMPLEX 30 MIN: CPT

## 2021-12-06 PROCEDURE — 99226 PR SUBSEQUENT OBSERVATION CARE,LEVEL III: CPT | Performed by: INTERNAL MEDICINE

## 2021-12-06 PROCEDURE — 97535 SELF CARE MNGMENT TRAINING: CPT

## 2021-12-06 PROCEDURE — 700105 HCHG RX REV CODE 258: Performed by: PHYSICIAN ASSISTANT

## 2021-12-06 PROCEDURE — 51798 US URINE CAPACITY MEASURE: CPT

## 2021-12-06 PROCEDURE — G0378 HOSPITAL OBSERVATION PER HR: HCPCS

## 2021-12-06 PROCEDURE — A9270 NON-COVERED ITEM OR SERVICE: HCPCS | Performed by: INTERNAL MEDICINE

## 2021-12-06 PROCEDURE — 97162 PT EVAL MOD COMPLEX 30 MIN: CPT

## 2021-12-06 RX ORDER — BETHANECHOL CHLORIDE 10 MG/1
10 TABLET ORAL 3 TIMES DAILY
Status: DISCONTINUED | OUTPATIENT
Start: 2021-12-06 | End: 2021-12-08 | Stop reason: HOSPADM

## 2021-12-06 RX ORDER — TAMSULOSIN HYDROCHLORIDE 0.4 MG/1
0.4 CAPSULE ORAL DAILY
Status: DISCONTINUED | OUTPATIENT
Start: 2021-12-06 | End: 2021-12-08 | Stop reason: HOSPADM

## 2021-12-06 RX ADMIN — ENOXAPARIN SODIUM 40 MG: 40 INJECTION SUBCUTANEOUS at 17:06

## 2021-12-06 RX ADMIN — DOCUSATE SODIUM 100 MG: 100 CAPSULE ORAL at 17:04

## 2021-12-06 RX ADMIN — SENNOSIDES AND DOCUSATE SODIUM 1 TABLET: 50; 8.6 TABLET ORAL at 21:00

## 2021-12-06 RX ADMIN — CALCIUM CARBONATE 500 MG: 500 TABLET, CHEWABLE ORAL at 17:04

## 2021-12-06 RX ADMIN — CALCIUM CARBONATE 500 MG: 500 TABLET, CHEWABLE ORAL at 05:01

## 2021-12-06 RX ADMIN — ASPIRIN 81 MG: 81 TABLET, COATED ORAL at 10:23

## 2021-12-06 RX ADMIN — BETHANECHOL CHLORIDE 10 MG: 10 TABLET ORAL at 17:04

## 2021-12-06 RX ADMIN — TAMSULOSIN HYDROCHLORIDE 0.4 MG: 0.4 CAPSULE ORAL at 10:23

## 2021-12-06 RX ADMIN — SENNOSIDES AND DOCUSATE SODIUM 1 TABLET: 50; 8.6 TABLET ORAL at 21:34

## 2021-12-06 RX ADMIN — OXYCODONE 5 MG: 5 TABLET ORAL at 17:04

## 2021-12-06 RX ADMIN — OXYCODONE 5 MG: 5 TABLET ORAL at 10:23

## 2021-12-06 RX ADMIN — OXYCODONE 5 MG: 5 TABLET ORAL at 21:34

## 2021-12-06 RX ADMIN — BETHANECHOL CHLORIDE 10 MG: 10 TABLET ORAL at 11:09

## 2021-12-06 RX ADMIN — OXYCODONE 5 MG: 5 TABLET ORAL at 05:10

## 2021-12-06 RX ADMIN — DEXTROSE MONOHYDRATE 2 G: 50 INJECTION, SOLUTION INTRAVENOUS at 04:20

## 2021-12-06 ASSESSMENT — COGNITIVE AND FUNCTIONAL STATUS - GENERAL
DAILY ACTIVITIY SCORE: 24
SUGGESTED CMS G CODE MODIFIER DAILY ACTIVITY: CH
WALKING IN HOSPITAL ROOM: A LITTLE
TURNING FROM BACK TO SIDE WHILE IN FLAT BAD: A LOT
MOBILITY SCORE: 23
CLIMB 3 TO 5 STEPS WITH RAILING: A LITTLE
STANDING UP FROM CHAIR USING ARMS: A LITTLE
SUGGESTED CMS G CODE MODIFIER MOBILITY: CK
SUGGESTED CMS G CODE MODIFIER DAILY ACTIVITY: CH
CLIMB 3 TO 5 STEPS WITH RAILING: A LITTLE
DAILY ACTIVITIY SCORE: 24
MOVING FROM LYING ON BACK TO SITTING ON SIDE OF FLAT BED: A LOT
MOBILITY SCORE: 15
SUGGESTED CMS G CODE MODIFIER MOBILITY: CI
MOVING TO AND FROM BED TO CHAIR: A LOT

## 2021-12-06 ASSESSMENT — ENCOUNTER SYMPTOMS
NAUSEA: 0
SEIZURES: 0
EYE REDNESS: 0
LOSS OF CONSCIOUSNESS: 0
TREMORS: 0
FEVER: 0
NERVOUS/ANXIOUS: 0
PALPITATIONS: 1
SHORTNESS OF BREATH: 0
FOCAL WEAKNESS: 0
CONSTIPATION: 0
BLOOD IN STOOL: 0
COUGH: 0
WHEEZING: 0
VOMITING: 0
FALLS: 0
DIARRHEA: 0
SENSORY CHANGE: 1
TINGLING: 1
MYALGIAS: 0
ABDOMINAL PAIN: 0
HEADACHES: 0
DIZZINESS: 0
WEAKNESS: 0
INSOMNIA: 0
EYE PAIN: 0
HEMOPTYSIS: 0
CHILLS: 0

## 2021-12-06 ASSESSMENT — GAIT ASSESSMENTS
ASSISTIVE DEVICE: FRONT WHEEL WALKER
GAIT LEVEL OF ASSIST: MINIMAL ASSIST
DEVIATION: DECREASED HEEL STRIKE;DECREASED TOE OFF;BRADYKINETIC
DISTANCE (FEET): 90

## 2021-12-06 ASSESSMENT — ACTIVITIES OF DAILY LIVING (ADL): TOILETING: INDEPENDENT

## 2021-12-06 ASSESSMENT — PAIN DESCRIPTION - PAIN TYPE
TYPE: SURGICAL PAIN
TYPE: SURGICAL PAIN
TYPE: ACUTE PAIN
TYPE: SURGICAL PAIN
TYPE: SURGICAL PAIN

## 2021-12-06 NOTE — PROGRESS NOTES
Hospital Medicine Daily Progress Note    Date of Service  12/6/2021    Chief Complaint  Edgar Jackson is a 61 y.o. male admitted 12/5/2021 by MARJAN Coates for lumbar radiculopathy and claudication, planned laminectomies. We chantal perez consulted for atrial flutter.    Hospital Course  No notes on file    Interval Problem Update  12/6. No chest pain,shortness of breath, palpitations or dizziness. Patient did reveal to me he had Afib in the past but had an ablation. He has no other stroke risk factors except for white coat hypertension. He is not on any meds for hypertension.    I have personally seen and examined the patient at bedside. I discussed the plan of care with patient, bedside RN and neurosurgery.    Consultants/Specialty  Hospitalist  MARJAN/Ortho Spine attending    Code Status  Full Code    Disposition  As per attending.    Review of Systems  Review of Systems   Constitutional: Negative for chills and fever.   HENT: Negative for congestion, hearing loss and nosebleeds.    Eyes: Negative for pain and redness.   Respiratory: Negative for cough, hemoptysis, shortness of breath and wheezing.    Cardiovascular: Positive for chest pain and palpitations.   Gastrointestinal: Negative for abdominal pain, blood in stool, constipation, diarrhea, nausea and vomiting.   Genitourinary: Negative for dysuria, frequency and hematuria.   Musculoskeletal: Negative for falls, joint pain and myalgias.   Skin: Negative for rash.   Neurological: Positive for tingling and sensory change. Negative for dizziness, tremors, focal weakness, seizures, loss of consciousness, weakness and headaches.   Psychiatric/Behavioral: The patient is not nervous/anxious and does not have insomnia.    All other systems reviewed and are negative.       Physical Exam  Temp:  [36.5 °C (97.7 °F)-36.9 °C (98.5 °F)] (P) 36.8 °C (98.2 °F)  Pulse:  [] (P) 55  Resp:  [12-18] (P) 16  BP: (131-172)/() 138/76  SpO2:  [92 %-99 %] (P) 94  %    Physical Exam  Vitals and nursing note reviewed.   HENT:      Head: Normocephalic and atraumatic.      Right Ear: External ear normal.      Left Ear: External ear normal.      Nose: Nose normal.      Mouth/Throat:      Mouth: Mucous membranes are moist.   Eyes:      General: No scleral icterus.     Conjunctiva/sclera: Conjunctivae normal.   Cardiovascular:      Rate and Rhythm: Normal rate and regular rhythm.      Heart sounds: No murmur heard.  No friction rub. No gallop.       Comments: Not irregular this morning.  Pulmonary:      Effort: Pulmonary effort is normal.      Breath sounds: Normal breath sounds.   Abdominal:      General: Abdomen is flat. Bowel sounds are normal. There is no distension.      Palpations: Abdomen is soft.      Tenderness: There is no abdominal tenderness. There is no guarding.   Musculoskeletal:         General: Normal range of motion.      Cervical back: Normal range of motion and neck supple.      Comments: Large drain with bloody output. Lumbar incision.   Skin:     General: Skin is warm.   Neurological:      Mental Status: He is alert and oriented to person, place, and time. Mental status is at baseline.   Psychiatric:         Mood and Affect: Mood normal.         Behavior: Behavior normal.         Thought Content: Thought content normal.         Judgment: Judgment normal.         Fluids    Intake/Output Summary (Last 24 hours) at 12/6/2021 0822  Last data filed at 12/6/2021 0500  Gross per 24 hour   Intake 1910 ml   Output 1345 ml   Net 565 ml       Laboratory  Recent Labs     12/05/21 2018   WBC 8.5   RBC 4.46*   HEMOGLOBIN 13.9*   HEMATOCRIT 43.4   MCV 97.3   MCH 31.2   MCHC 32.0*   RDW 46.3   PLATELETCT 165   MPV 9.6     Recent Labs     12/05/21 2018   SODIUM 140   POTASSIUM 4.7   CHLORIDE 103   CO2 26   GLUCOSE 149*   BUN 11   CREATININE 1.03   CALCIUM 9.0                   Imaging  DX-CHEST-PORTABLE (1 VIEW)   Final Result      No acute cardiac or pulmonary abnormalities  are identified.      DX-SPINE-ANY ONE VIEW   Final Result      Intraoperative localization of the lumbar spine.      DX-PORTABLE FLUORO > 1 HOUR   Final Result      Intraoperative localization of the lumbar spine.           Assessment/Plan  * Spinal stenosis of lumbar region with neurogenic claudication s/p laminectomies c/b Aflutter- (present on admission)  Assessment & Plan  Defer to neurosurgery.    Atrial flutter (HCC)- (present on admission)  Assessment & Plan  Chest x-ray, EKG, troponin, BMP and magnesium ordered and unremarkable.  Suggest Lopressor 25 mg p.o. twice daily and optimization of dose as needed with telemetry monitoring and vitals.  Discussed with patient and wife of the importance of outpatient cardiology consultation at discharge given risk of stroke without anticoagulation..  Thank you for the opportunity to participate in the care of this patient will follow on as-needed basis.         VTE prophylaxis: enoxaparin ppx    I have performed a physical exam and reviewed and updated ROS and Plan today (12/6/2021). In review of yesterday's note (12/5/2021), there are no changes except as documented above.

## 2021-12-06 NOTE — ASSESSMENT & PLAN NOTE
"Chest x-ray, EKG, troponin, BMP and magnesium ordered and unremarkable.  Suggest Lopressor 25 mg p.o. twice daily and optimization of dose as needed with telemetry monitoring and vitals.  Discussed with patient and wife of the importance of outpatient cardiology consultation at discharge given risk of stroke without anticoagulation..  Thank you for the opportunity to participate in the care of this patient will follow on as-needed basis.\"    He does have a history of Afib several years ago s/p ablation.  He has no other risk factors other than hypertension which he is NOT on any medications. He mentions he has white coat hypertension.  CHADsVAsc score is only 1 (possible hypertension). He is actually NOT indicated for anticoagulation. He can get baby aspirin. I did speak for neurosurgery; if anticoagulation is indicated ok to start. For now he will follow up with his Cardiologist.  "

## 2021-12-06 NOTE — OP REPORT
DATE OF SERVICE:  12/05/2021     PREOPERATIVE DIAGNOSES:  1.  L2-S1 lumbar stenosis, bilateral recess stenosis.  2.  L4-5 large central disk herniation.  3.  Bilateral neurogenic claudication.  4.  Bilateral L4-5 radiculopathies.  5.  Failed conservative care.     POSTOPERATIVE DIAGNOSES:  1.  L2-S1 lumbar stenosis, bilateral recess stenosis.  2.  L4-5 large central disk herniation.  3.  Bilateral neurogenic claudication.  4.  Bilateral L4-5 radiculopathies.  5.  Failed conservative care.     PROCEDURES:  1.  L2 through S1 decompressive lumbar laminectomies with bilateral   foraminotomies.  2.  Left L4 transpedicular approach far lateral decompression of left L4 nerve   root.  3.  Left L5 transpedicular approach far lateral decompression of left L5 nerve   root.  4.  Left L4-5 microdiskectomy.  5.  SSEP, EMG monitoring performed by neuro monitoring associates, which   remained stable throughout.     SURGEON:  Placido Marie MD, Neurosurgery and Spine Surgery.     ASSISTANT:  Sixto Bartlett PA-C     ANESTHESIA:  General endotracheal anesthesia.     ANESTHESIOLOGIST:  Tobey B. Gansert, MD     COMPLICATIONS:  None.     ESTIMATED BLOOD LOSS:  Less than 200 mL.     PREOPERATIVE NOTE:  This is an extremely pleasant 61-year-old male who   presents with mechanical low back pain with bilateral lower extremity   neurogenic claudication, mechanical bilateral back pain with paresthesia.  An   MRI showed severe lumbar stenosis L2-S1 levels with marked facet ligamentous   hypertrophy in the setting of a shallow spinal canal.  There was severe   stenosis at each levels in addition to a large paracentral L4-5 disk   herniation.  The patient failed conservative care, his quality of life is   markedly diminished.  I discussed surgical procedure, alternatives, goals,   risks, benefits, complications in detail with the patient.  The patient   understood and consented to surgery.     DESCRIPTION OF PROCEDURE:  The patient was  brought to the operating room and   placed under general endotracheal anesthesia.  He was placed prone on the   operating OSI table with care taken to avoid the bony prominences, peripheral   nerves.  Lumbar region was prepped and draped in the usual sterile fashion.    Following localization with cross table fluoroscopy, a midline incision was   made from L2-S1.  The dorsal elements of L2-S1 were exposed in a subperiosteal   fashion out to the facets bilaterally.  Self-retaining large Gelpi retractors   were applied.  Intraoperative x-ray confirmed appropriate levels.    Decompressive lumbar laminectomies of L2, L3, L4, L5 and S1 were completed.    Marked facet and ligamentous hypertrophy was undercut and removed.  There was   severe stenosis in the case throughout, this was one of the most challenging   laminectomies I performed in years given the vascularity of the patient's   dense bone and the severe critical stenosis throughout.  In any event, this   was all removed widely.  The microscope was used for microdissection of spinal   canal.  There was a large paracentral disk herniation at L4-5 seen on the MRI   and found intraoperatively as well correlating with that.  Hence I drilled   down the left L4 pedicle for transpedicular decompression of left L4 nerve   root.  I separately and distinctly drilled down the left L5 pedicle for   transpedicular decompression of left L5 nerve root.  This required extra   degree of expertise, effort and time, hence a modifier-59 is required for CPT   code 00017-96288.  Under microscope, I incised the disk at L4-5, removed the   disk material and entered the disk space, removed any loose disk, this   completely decompressed the thecal sac at this level.  The Martínez ball hook   was easily placed over the exiting nerve roots.  Thecal sac and nerve roots   were nice and freed up throughout.  The wound was irrigated with bacitracin   irrigation.     I placed an epidural catheter  with Marcaine and fentanyl for postoperative   analgesia, infiltrated the muscle with long-acting local anesthetic and   hemostasis was achieved.  I closed the wound over a drain, placed vancomycin   powder in the wound and closed the wound with 0 Vicryl to deep fascia, 2-0   Vicryl to deep dermal layer, Steri-Strips to skin.  Small sterile dressing was   applied.  Swabs, needles, instruments correct by two count.  No complications   were encountered.  The patient tolerated the procedure, was stable and   transferred to recovery room.  The patient will be observed at [facility] until he meets discharge criteria.  The patient will follow up   at [facility] in 2 weeks and 6 weeks' time as arranged.     INTRAOPERATIVE FINDINGS:  Severe critical stenosis L2 to L5-S1 levels with   marked stenosis at L4-5 as well with a large central disk herniation, which   was freed up extensively.  No complications were encountered.  The patient was   stable in recovery room, doing well.        ______________________________  MD KG CAMPBELL/RICO/KATI    DD:  12/05/2021 15:09  DT:  12/05/2021 16:03    Job#:  [id number]    CC:GLORIA Zarate

## 2021-12-06 NOTE — PROGRESS NOTES
Received bedside report from BERNARD Tucker, pt care assumed. VSS, pt assessment complete. Pt A&Ox4, c/o lower back pain at this time. POC discussed with pt and verbalizes no questions. Pt denies any additional needs at this time. Bed locked and in lowest position. Pt educated on fall risk and verbalized understanding, call light within reach, hourly rounding initiated.

## 2021-12-06 NOTE — CARE PLAN
The patient is Stable - Low risk of patient condition declining or worsening    Shift Goals  Clinical Goals: Monitor surgical site/drain  Patient Goals: Rest  Family Goals: Rest, recovery    Progress made toward(s) clinical / shift goals:        Problem: Knowledge Deficit - Standard  Goal: Patient and family/care givers will demonstrate understanding of plan of care, disease process/condition, diagnostic tests and medications  Outcome: Progressing     Problem: Psychosocial  Goal: Patient's level of anxiety will decrease  Outcome: Progressing     Problem: Communication  Goal: The ability to communicate needs accurately and effectively will improve  Outcome: Progressing

## 2021-12-06 NOTE — PROGRESS NOTES
Bladder scan of 346mL. MD Mayberry notified. Ordered to re-scan in 1 hour and if >400mL, place castillo catheter.

## 2021-12-06 NOTE — OR NURSING
PACU note- Respirations easy.  O x 4. MAEW.  Denies numbness and tingling.  Patient went into atrial fibrillation/flutter in PACU. Dr. Gansert, Dr. Marie notified.  Patient denies chest pain or shortness of breath.  Ambulated to  to void QS. OOB tolerated well.  Dr. Miki Wade in PACU to see patient.  He ordered second dose of Metoprolol, he is aware that patient had prior dose of Metoprolol in PACU. Patient converted to NSR as second dose was being administered, HR 70s. No ventricular ectopy. Patient ate dinner, no nausea. Report to floor and transported to Tele. Wife at bedside.

## 2021-12-06 NOTE — PROGRESS NOTES
Neurosurgery Progress Note    Subjective:  POD1 L2-5 laminectomies  Transferred to [facility] for Afib/flutter perioperatively  C/o increased back pain today  Unsteady when OOB  C/o urinary retention  Drain output 80cc last 8 hrs    Exam:  Pleasant and cooperative  Strike through bleeding on dressing, otherwise dry  L AT/EHL 4+/5, otherwise strength 5/5 throughout  Sensation intact    BP  Min: 124/73  Max: 172/144  Pulse  Av  Min: 55  Max: 131  Resp  Av  Min: 12  Max: 18  Temp  Av.7 °C (98.1 °F)  Min: 36.5 °C (97.7 °F)  Max: 36.9 °C (98.5 °F)  SpO2  Av.7 %  Min: 92 %  Max: 99 %    No data recorded    Recent Labs     21   WBC 8.5   RBC 4.46*   HEMOGLOBIN 13.9*   HEMATOCRIT 43.4   MCV 97.3   MCH 31.2   MCHC 32.0*   RDW 46.3   PLATELETCT 165   MPV 9.6     Recent Labs     21   SODIUM 140   POTASSIUM 4.7   CHLORIDE 103   CO2 26   GLUCOSE 149*   BUN 11   CREATININE 1.03   CALCIUM 9.0               Intake/Output                       21 0700 - 21 0659 21 0700 - 21 0659     3339-3452 0815-6271 Total 9407-3856 8526-1347 Total                 Intake    P.O.  90  120 210  120  -- 120    P.O. 90 120 210 120 -- 120    I.V.  1700  -- 1700  --  -- --    Volume (mL) (Lactated Ringers) 1200 -- 1200 -- -- --    Volume (mL) (lactated ringers infusion) 500 -- 500 -- -- --    Total Intake 3309 525 5735 120 -- 120       Output    Urine  --  875 875  --  -- --    Number of Times Voided 1 x 6 x 7 x -- -- --    Straight Catheter -- 500 500 -- -- --    Urine Void (mL) -- 375 375 -- -- --    Drains  140  280 420  --  -- --    Output (mL) (Closed/Suction Drain 1 Posterior Back Hemovac) 140 280 420 -- -- --    Stool  --  -- --  --  -- --    Number of Times Stooled -- 1 x 1 x -- -- --    Blood  50  -- 50  --  -- --    Est. Blood Loss 50 -- 50 -- -- --    Total Output 190 4895 1345 -- -- --       Net I/O     1600 -3179 566 120 -- 120            Intake/Output Summary (Last 24 hours) at  12/6/2021 1013  Last data filed at 12/6/2021 0800  Gross per 24 hour   Intake 2030 ml   Output 1345 ml   Net 685 ml       $ Bladder Scan Results (mL): 565    • aspirin EC  81 mg DAILY   • methocarbamol  750 mg Q8HRS PRN   • Pharmacy Consult Request  1 Each PHARMACY TO DOSE   • MD ALERT...DO NOT ADMINISTER NSAIDS or ASPIRIN unless ORDERED By Neurosurgery  1 Each PRN   • docusate sodium  100 mg BID   • senna-docusate  1 Tablet Nightly   • senna-docusate  1 Tablet Q24HRS PRN   • polyethylene glycol/lytes  1 Packet BID PRN   • magnesium hydroxide  30 mL QDAY PRN   • bisacodyl  10 mg Q24HRS PRN   • sodium phosphate  1 Each Once PRN   • enoxaparin  40 mg DAILY AT 1800   • diphenhydrAMINE  25 mg Q6HRS PRN    Or   • diphenhydrAMINE  25 mg Q6HRS PRN   • ondansetron  4 mg Q4HRS PRN   • ondansetron  4 mg Q4HRS PRN   • promethazine  12.5-25 mg Q4HRS PRN   • promethazine  12.5-25 mg Q4HRS PRN   • prochlorperazine  5-10 mg Q4HRS PRN   • ALPRAZolam  0.25 mg BID PRN   • labetalol  10 mg Q HOUR PRN   • benzocaine-menthol  1 Lozenge Q2HRS PRN   • calcium carbonate  500 mg BID   • HYDROcodone/acetaminophen  1 Tablet Q4HRS PRN   • oxyCODONE immediate-release  5 mg Q4HRS PRN   • HYDROmorphone  0.5 mg Q3HRS PRN       Assessment and Plan:  Hospital day #2  POD #1  Appreciate Hospitalist consult for Afib/flutter, they plan no intervention at this time  Urinary retention, will start Urecholine and Flomax  Keep drain in until 30cc or less in 8 hour period  Pain control  Ice  Mobilize, PT/OT  Home tomorrow  Prophylactic anticoagulation: yes         Start date/time: today

## 2021-12-06 NOTE — CONSULTS
Hospital Medicine Consultation    Date of Service  12/5/2021    Referring Physician  Placido Marie M.D.    Consulting Physician  Miki Paez M.D.    Reason for Consultation  Atrial flutter    History of Presenting Illness  61 y.o. male who presented 12/5/2021 for voluntary LAMINECTOMY, SPINE, LUMBAR, WITH DISCECTOMY - L2-5.  Perioperatively he developed atrial flutter prompting hospitalist consultation.  At bedside patient is awake alert oriented conversational and appropriate denying chest pain or shortness of breath.  Admitting previous A-flutter status post ablation 2018 but subsequent episodes of palpitations over the last 12 months.  He has subsequently been reluctant to seek medical attention secondary to his father's complications with anticoagulation.  He has not had any treatment for his paroxysmal A. fib or flutter.  That said his heart rate is ranging from the 90s to 140s.  He has received Lopressor 5 mg x 1.  This is repeated x1 resulting in a controlled a flutter rate of 80.  He denies history of bleeding though is not interested in anticoagulation.    Review of Systems  Review of Systems   Constitutional: Negative for fever, malaise/fatigue and weight loss.   HENT: Negative for sore throat and tinnitus.    Eyes: Negative for blurred vision and double vision.   Respiratory: Negative for cough, hemoptysis and stridor.    Cardiovascular: Negative for chest pain and palpitations.   Gastrointestinal: Negative for nausea and vomiting.   Genitourinary: Negative for dysuria and urgency.   Musculoskeletal: Negative for myalgias and neck pain.   Skin: Negative for itching and rash.   Neurological: Negative for dizziness and headaches.   Endo/Heme/Allergies: Does not bruise/bleed easily.   Psychiatric/Behavioral: Negative for depression. The patient does not have insomnia.        Past Medical History   has a past medical history of Arrhythmia (2018), Back pain, and Neck pain.    Surgical History   has a past  surgical history that includes other (06/2018); cervical disk and fusion anterior (2013); other cardiac surgery (06/2018); cervical disk and fusion anterior (12/4/2018); hardware removal neuro (12/4/2018); and other orthopedic surgery (Left, 03/2021).    Family History  family history includes Heart Disease in his father.    Social History   reports that he has never smoked. He has never used smokeless tobacco. He reports current alcohol use. He reports that he does not use drugs.    Medications  Prior to Admission Medications   Prescriptions Last Dose Informant Patient Reported? Taking?   cephALEXin (KEFLEX) 500 MG Cap  Patient No No   Sig: Take 1 Cap by mouth every 6 hours.   Patient not taking: Reported on 12/2/2021   methocarbamol (ROBAXIN) 750 MG Tab  Patient No No   Sig: Take 1 Tab by mouth every 8 hours as needed.   Patient not taking: Reported on 12/2/2021      Facility-Administered Medications: None       Allergies  Allergies   Allergen Reactions   • Nkda [No Known Drug Allergy]        Physical Exam  Temp:  [36.5 °C (97.7 °F)-36.7 °C (98.1 °F)] 36.6 °C (97.8 °F)  Pulse:  [] 69  Resp:  [12-18] 16  BP: (134-172)/() 140/102  SpO2:  [95 %-99 %] 98 %    Physical Exam  Vitals and nursing note reviewed.   Constitutional:       General: He is not in acute distress.     Appearance: Normal appearance. He is normal weight. He is not toxic-appearing.   HENT:      Head: Normocephalic and atraumatic.      Nose: Nose normal. No congestion or rhinorrhea.      Mouth/Throat:      Mouth: Mucous membranes are moist.      Pharynx: Oropharynx is clear.   Eyes:      Extraocular Movements: Extraocular movements intact.      Conjunctiva/sclera: Conjunctivae normal.      Pupils: Pupils are equal, round, and reactive to light.   Neck:      Vascular: No carotid bruit.   Cardiovascular:      Rate and Rhythm: Tachycardia present. Rhythm irregular.      Pulses: Normal pulses.      Heart sounds: Normal heart sounds. No murmur  heard.  No gallop.    Pulmonary:      Effort: No respiratory distress.      Breath sounds: Normal breath sounds. No wheezing or rales.   Abdominal:      General: Abdomen is flat. Bowel sounds are normal. There is no distension.      Palpations: Abdomen is soft. There is no mass.      Tenderness: There is no abdominal tenderness.      Hernia: No hernia is present.   Musculoskeletal:         General: No swelling, tenderness, deformity or signs of injury.      Cervical back: Normal range of motion and neck supple. No muscular tenderness.      Right lower leg: No edema.      Left lower leg: No edema.      Comments: L-spine surgical incision with drain in place.   Lymphadenopathy:      Cervical: No cervical adenopathy.   Skin:     Capillary Refill: Capillary refill takes less than 2 seconds.      Coloration: Skin is not jaundiced or pale.      Findings: No bruising.   Neurological:      General: No focal deficit present.      Mental Status: He is alert and oriented to person, place, and time. Mental status is at baseline.      Cranial Nerves: No cranial nerve deficit.      Motor: No weakness.      Coordination: Coordination normal.   Psychiatric:         Mood and Affect: Mood normal.         Thought Content: Thought content normal.         Judgment: Judgment normal.         Fluids  Date 12/05/21 0700 - 12/06/21 0659   Shift 8119-7688 9609-8519 6731-2025 24 Hour Total   INTAKE   P.O.  210  210   I.V.  1700  1700   Shift Total  1910  1910   OUTPUT   Drains  140  140   Blood  50  50   Shift Total  190  190   Weight (kg) 97.3 97.3 97.3 97.3       Laboratory  Recent Labs     12/05/21 2018   WBC 8.5   RBC 4.46*   HEMOGLOBIN 13.9*   HEMATOCRIT 43.4   MCV 97.3   MCH 31.2   MCHC 32.0*   RDW 46.3   PLATELETCT 165   MPV 9.6     Recent Labs     12/05/21 2018   SODIUM 140   POTASSIUM 4.7   CHLORIDE 103   CO2 26   GLUCOSE 149*   BUN 11   CREATININE 1.03   CALCIUM 9.0                     Imaging  DX-CHEST-PORTABLE (1 VIEW)   Final  Result      No acute cardiac or pulmonary abnormalities are identified.      DX-SPINE-ANY ONE VIEW   Final Result      Intraoperative localization of the lumbar spine.      DX-PORTABLE FLUORO > 1 HOUR   Final Result      Intraoperative localization of the lumbar spine.          Assessment/Plan  * Low back pain radiating to right leg- (present on admission)  Assessment & Plan  Defer to neurosurgery.    Atrial flutter (HCC)- (present on admission)  Assessment & Plan  Chest x-ray, EKG, troponin, BMP and magnesium ordered and unremarkable.  Suggest Lopressor 25 mg p.o. twice daily and optimization of dose as needed with telemetry monitoring and vitals.  Discussed with patient and wife of the importance of outpatient cardiology consultation at discharge given risk of stroke without anticoagulation..  Thank you for the opportunity to participate in the care of this patient will follow on as-needed basis.

## 2021-12-06 NOTE — CARE PLAN
The patient is Watcher - Medium risk of patient condition declining or worsening    Shift Goals  Clinical Goals: Hemodynamic stability, VSS, prevent post op complications  Patient Goals: Rest, pain control  Family Goals: Rest, recovery    Progress made toward(s) clinical / shift goals:      Problem: Knowledge Deficit - Standard  Goal: Patient and family/care givers will demonstrate understanding of plan of care, disease process/condition, diagnostic tests and medications  Outcome: Progressing     Problem: Hemodynamics  Goal: Patient's hemodynamics, fluid balance and neurologic status will be stable or improve  Outcome: Progressing     Problem: Fluid Volume  Goal: Fluid volume balance will be maintained  Outcome: Progressing     Problem: Respiratory  Goal: Patient will achieve/maintain optimum respiratory ventilation and gas exchange  Outcome: Progressing     Problem: Urinary Elimination  Goal: Establish and maintain regular urinary output  Outcome: Progressing     Problem: Mobility  Goal: Patient's capacity to carry out activities will improve  Outcome: Progressing     Problem: Self Care  Goal: Patient will have the ability to perform ADLs independently or with assistance (bathe, groom, dress, toilet and feed)  Outcome: Progressing     Problem: Communication  Goal: The ability to communicate needs accurately and effectively will improve  Outcome: Progressing

## 2021-12-06 NOTE — PROGRESS NOTES
Bedside report received and patient transferred to unit by Carla WAGNER. Pt is up to bathroom with staff, A&Ox4, with no complaints of pain, and is on 2L NC. /103. IV labetalol given per MAR. Tele box on and monitor room notified. All fall precautions are in place, belongings at bedside table.  Pt was updated on POC, no questions or concerns. Pt educated on use of call light for assistance. Will continue to monitor condition post op and perform cms checks.

## 2021-12-06 NOTE — THERAPY
Physical Therapy   Initial Evaluation     Patient Name: Edgar Jackson  Age:  61 y.o., Sex:  male  Medical Record #: [id number]  Today's Date: 12/6/2021     Precautions  Precautions: Fall Risk;Spinal / Back Precautions   Comments: No brace    Assessment  Patient is 61 y.o. male POD #1 L2-L5 laminectomies.  Today patient with B LE strength grossly 5/5.  Patient reported new onset of B toe numbness and L shin numbness & tingling since surgery.  Patient required CGA for STS and ambulation.  Patient was able to ambulate ~90 ft x 2 with FWW.  Patient ambulated with slow pace, hip & knee flexion, increased L swing due to numbness, and gross unsteadiness with gait.  Provided patient education regarding post-op precautions and log roll, handout provided.  Recommend continued acute PT and home health PT to address deficits in gait, balance, & coordination.     Plan    Recommend Physical Therapy 5 times per week until therapy goals are met for the following treatments:  Bed Mobility, Equipment, Gait Training, Neuro Re-Education / Balance, Self Care/Home Evaluation, Stair Training, Therapeutic Activities and Therapeutic Exercises    DC Equipment Recommendations: Front-Wheel Walker  Discharge Recommendations: Recommend home health for continued physical therapy services       Objective     12/06/21 0854   Prior Living Situation   Prior Services Home-Independent   Housing / Facility 2 Story House   Steps Into Home 2   Steps In Home (FOS down)   Rail (rail in home, side not specified)   Equipment Owned None   Lives with - Patient's Self Care Capacity Spouse (can help as needed)   Comments Pt reports bedroom & bathroom on 1st floor, does not need to go downstairs   Prior Level of Functional Mobility   Bed Mobility Independent   Transfer Status Independent   Ambulation Independent   Distance Ambulation (Feet) (limited community)   Assistive Devices Used None   Stairs Independent   Cognition    Cognition / Consciousness WDL    Level of Consciousness Alert   Comments Pleasant & cooperative   Active ROM Lower Body    Active ROM Lower Body  WDL   Comments Unable to flex L knee past 90* due to previous meniscus surgery   Strength Lower Body   Lower Body Strength  WDL   Comments B LE grossly 5/5   Sensation Lower Body   Lower Extremity Sensation   X   Comments Reports new B toes numbness since surgery, L shin numbness & tingling   Balance Assessment   Sitting Balance (Static) Fair +   Sitting Balance (Dynamic) Fair +   Standing Balance (Static) Fair   Standing Balance (Dynamic) Fair -   Weight Shift Sitting Poor   Weight Shift Standing Poor   Comments w/ FWW   Gait Analysis   Gait Level Of Assist Minimal Assist (CGA)   Assistive Device Front Wheel Walker   Distance (Feet) 90   # of Times Distance was Traveled 2   Deviation Decreased Heel Strike;Decreased Toe Off;Bradykinetic (Hip & knee flexion, difficulty w/ full upright posture)   # of Stairs Climbed 0   Comments Increased L swing due to numbness, pain & effort with ambulation   Bed Mobility    Supine to Sit (NT, up in chair)   Sit to Supine Supervised   Comments cues for log roll, HOB fully elevated   Functional Mobility   Sit to Stand Minimal Assist   Bed, Chair, Wheelchair Transfer Minimal Assist   Transfer Method Stand Step   Mobility STS, ambulation   Comments cues for maintaining precautions throughout transfer   Activity Tolerance   Sitting in Chair 10+ min (pre session)   Sitting Edge of Bed 2 min   Standing 10 min   Comments limited by pain   Short Term Goals    Short Term Goal # 1 Pt will perform supine <> sit without bed features via log roll within 6 visits in order to progress toward PLOF   Short Term Goal # 2 Pt will perform STS/functional transfers with FWW and SPV within 6 visits in order to progress OOB activity   Short Term Goal # 3 Pt will ambulate 200 ft with FWW and SPV within 6 visits in order to progress toward PLOF   Short Term Goal # 4 Pt will negotiate 2 steps  within 6 visits in order to access home environment   Session Information   Date / Session Number  12/6 - 1 (1/5, 12/12)

## 2021-12-07 PROBLEM — M48.061 DEGENERATIVE LUMBAR SPINAL STENOSIS: Status: ACTIVE | Noted: 2021-12-07

## 2021-12-07 PROCEDURE — 700102 HCHG RX REV CODE 250 W/ 637 OVERRIDE(OP): Performed by: INTERNAL MEDICINE

## 2021-12-07 PROCEDURE — A9270 NON-COVERED ITEM OR SERVICE: HCPCS | Performed by: INTERNAL MEDICINE

## 2021-12-07 PROCEDURE — A9270 NON-COVERED ITEM OR SERVICE: HCPCS | Performed by: PHYSICIAN ASSISTANT

## 2021-12-07 PROCEDURE — 700102 HCHG RX REV CODE 250 W/ 637 OVERRIDE(OP): Performed by: PHYSICIAN ASSISTANT

## 2021-12-07 PROCEDURE — 99232 SBSQ HOSP IP/OBS MODERATE 35: CPT | Performed by: STUDENT IN AN ORGANIZED HEALTH CARE EDUCATION/TRAINING PROGRAM

## 2021-12-07 PROCEDURE — 700111 HCHG RX REV CODE 636 W/ 250 OVERRIDE (IP): Performed by: PHYSICIAN ASSISTANT

## 2021-12-07 PROCEDURE — 770001 HCHG ROOM/CARE - MED/SURG/GYN PRIV*

## 2021-12-07 PROCEDURE — 51798 US URINE CAPACITY MEASURE: CPT

## 2021-12-07 PROCEDURE — 97116 GAIT TRAINING THERAPY: CPT

## 2021-12-07 RX ADMIN — ENOXAPARIN SODIUM 40 MG: 40 INJECTION SUBCUTANEOUS at 17:08

## 2021-12-07 RX ADMIN — DOCUSATE SODIUM 100 MG: 100 CAPSULE ORAL at 17:08

## 2021-12-07 RX ADMIN — HYDROCODONE BITARTRATE AND ACETAMINOPHEN 1 TABLET: 10; 325 TABLET ORAL at 01:21

## 2021-12-07 RX ADMIN — BETHANECHOL CHLORIDE 10 MG: 10 TABLET ORAL at 05:34

## 2021-12-07 RX ADMIN — OXYCODONE 5 MG: 5 TABLET ORAL at 20:05

## 2021-12-07 RX ADMIN — SENNOSIDES AND DOCUSATE SODIUM 1 TABLET: 50; 8.6 TABLET ORAL at 20:05

## 2021-12-07 RX ADMIN — BETHANECHOL CHLORIDE 10 MG: 10 TABLET ORAL at 17:08

## 2021-12-07 RX ADMIN — TAMSULOSIN HYDROCHLORIDE 0.4 MG: 0.4 CAPSULE ORAL at 05:34

## 2021-12-07 RX ADMIN — POLYETHYLENE GLYCOL 3350 1 PACKET: 17 POWDER, FOR SOLUTION ORAL at 12:42

## 2021-12-07 RX ADMIN — OXYCODONE 5 MG: 5 TABLET ORAL at 12:38

## 2021-12-07 RX ADMIN — ASPIRIN 81 MG: 81 TABLET, COATED ORAL at 05:34

## 2021-12-07 RX ADMIN — BETHANECHOL CHLORIDE 10 MG: 10 TABLET ORAL at 11:32

## 2021-12-07 RX ADMIN — CALCIUM CARBONATE 500 MG: 500 TABLET, CHEWABLE ORAL at 17:07

## 2021-12-07 RX ADMIN — CALCIUM CARBONATE 500 MG: 500 TABLET, CHEWABLE ORAL at 05:34

## 2021-12-07 ASSESSMENT — PAIN DESCRIPTION - PAIN TYPE
TYPE: SURGICAL PAIN
TYPE: SURGICAL PAIN
TYPE: ACUTE PAIN
TYPE: SURGICAL PAIN

## 2021-12-07 ASSESSMENT — GAIT ASSESSMENTS
DEVIATION: BRADYKINETIC;SHUFFLED GAIT
DISTANCE (FEET): 250
GAIT LEVEL OF ASSIST: SUPERVISED
ASSISTIVE DEVICE: FRONT WHEEL WALKER

## 2021-12-07 ASSESSMENT — COGNITIVE AND FUNCTIONAL STATUS - GENERAL
WALKING IN HOSPITAL ROOM: A LITTLE
CLIMB 3 TO 5 STEPS WITH RAILING: A LITTLE
STANDING UP FROM CHAIR USING ARMS: A LITTLE
MOVING TO AND FROM BED TO CHAIR: A LITTLE
MOBILITY SCORE: 20
SUGGESTED CMS G CODE MODIFIER MOBILITY: CJ

## 2021-12-07 ASSESSMENT — ENCOUNTER SYMPTOMS
CONSTIPATION: 0
ABDOMINAL PAIN: 0
INSOMNIA: 0
TREMORS: 0
DIARRHEA: 0
VOMITING: 0
COUGH: 0
SHORTNESS OF BREATH: 0
TINGLING: 1
CHILLS: 0
FEVER: 0
EYE PAIN: 0
SENSORY CHANGE: 1
BLOOD IN STOOL: 0
SEIZURES: 0
WHEEZING: 0
FOCAL WEAKNESS: 0
MYALGIAS: 0
WEAKNESS: 0
EYE REDNESS: 0
HEADACHES: 0
HEMOPTYSIS: 0
NAUSEA: 0
DIZZINESS: 0
LOSS OF CONSCIOUSNESS: 0
NERVOUS/ANXIOUS: 0
PALPITATIONS: 1
FALLS: 0

## 2021-12-07 NOTE — PROGRESS NOTES
Hospital Medicine Daily Progress Note    Date of Service  12/7/2021    Chief Complaint  Edgar Jackson is a 61 y.o. male admitted 12/5/2021 by MARJAN Coates for lumbar radiculopathy and claudication, planned laminectomies. We chantal perez consulted for atrial flutter.    Hospital Course  No notes on file    Interval Problem Update  12/6. No chest pain,shortness of breath, palpitations or dizziness. Patient did reveal to me he had Afib in the past but had an ablation. He has no other stroke risk factors except for white coat hypertension. He is not on any meds for hypertension.    12/7: No acute events overnight. Patient remains in sinus rhythm overnight. Can stop telemetry monitoring. Hospitalist medicine service will sign off.      I have personally seen and examined the patient at bedside. I discussed the plan of care with patient, bedside RN and neurosurgery.    Consultants/Specialty  Hospitalist  MARJAN/[facility]th[facility] Spine attending    Code Status  Full Code    Disposition  As per attending.    Review of Systems  Review of Systems   Constitutional: Negative for chills and fever.   HENT: Negative for congestion, hearing loss and nosebleeds.    Eyes: Negative for pain and redness.   Respiratory: Negative for cough, hemoptysis, shortness of breath and wheezing.    Cardiovascular: Positive for chest pain and palpitations.   Gastrointestinal: Negative for abdominal pain, blood in stool, constipation, diarrhea, nausea and vomiting.   Genitourinary: Negative for dysuria, frequency and hematuria.   Musculoskeletal: Negative for falls, joint pain and myalgias.   Skin: Negative for rash.   Neurological: Positive for tingling and sensory change. Negative for dizziness, tremors, focal weakness, seizures, loss of consciousness, weakness and headaches.   Psychiatric/Behavioral: The patient is not nervous/anxious and does not have insomnia.    All other systems reviewed and are negative.       Physical Exam  Temp:  [36.3 °C (97.4  °F)-37.6 °C (99.7 °F)] 37 °C (98.6 °F)  Pulse:  [68-84] 68  Resp:  [15-18] 18  BP: (122-131)/(60-83) 130/79  SpO2:  [90 %-95 %] 95 %    Physical Exam  Vitals and nursing note reviewed.   HENT:      Head: Normocephalic and atraumatic.      Right Ear: External ear normal.      Left Ear: External ear normal.      Nose: Nose normal.      Mouth/Throat:      Mouth: Mucous membranes are moist.   Eyes:      General: No scleral icterus.     Conjunctiva/sclera: Conjunctivae normal.   Cardiovascular:      Rate and Rhythm: Normal rate and regular rhythm.      Heart sounds: No murmur heard.  No friction rub. No gallop.       Comments: Not irregular this morning.  Pulmonary:      Effort: Pulmonary effort is normal.      Breath sounds: Normal breath sounds.   Abdominal:      General: Abdomen is flat. Bowel sounds are normal. There is no distension.      Palpations: Abdomen is soft.      Tenderness: There is no abdominal tenderness. There is no guarding.   Musculoskeletal:         General: Normal range of motion.      Cervical back: Normal range of motion and neck supple.      Comments: Large drain with bloody output. Lumbar incision.   Skin:     General: Skin is warm.   Neurological:      Mental Status: He is alert and oriented to person, place, and time. Mental status is at baseline.   Psychiatric:         Mood and Affect: Mood normal.         Behavior: Behavior normal.         Thought Content: Thought content normal.         Judgment: Judgment normal.         Fluids    Intake/Output Summary (Last 24 hours) at 12/7/2021 1435  Last data filed at 12/7/2021 0500  Gross per 24 hour   Intake --   Output 490 ml   Net -490 ml       Laboratory  Recent Labs     12/05/21 2018   WBC 8.5   RBC 4.46*   HEMOGLOBIN 13.9*   HEMATOCRIT 43.4   MCV 97.3   MCH 31.2   MCHC 32.0*   RDW 46.3   PLATELETCT 165   MPV 9.6     Recent Labs     12/05/21 2018   SODIUM 140   POTASSIUM 4.7   CHLORIDE 103   CO2 26   GLUCOSE 149*   BUN 11   CREATININE 1.03  "  CALCIUM 9.0                   Imaging  DX-CHEST-PORTABLE (1 VIEW)   Final Result      No acute cardiac or pulmonary abnormalities are identified.      DX-SPINE-ANY ONE VIEW   Final Result      Intraoperative localization of the lumbar spine.      DX-PORTABLE FLUORO > 1 HOUR   Final Result      Intraoperative localization of the lumbar spine.           Assessment/Plan  * Spinal stenosis of lumbar region with neurogenic claudication s/p laminectomies c/b Aflutter- (present on admission)  Assessment & Plan  Defer to neurosurgery.    Atrial flutter (HCC)- (present on admission)  Assessment & Plan  Chest x-ray, EKG, troponin, BMP and magnesium ordered and unremarkable.  Suggest Lopressor 25 mg p.o. twice daily and optimization of dose as needed with telemetry monitoring and vitals.  Discussed with patient and wife of the importance of outpatient cardiology consultation at discharge given risk of stroke without anticoagulation..  Thank you for the opportunity to participate in the care of this patient will follow on as-needed basis.\"    He does have a history of Afib several years ago s/p ablation.  He has no other risk factors other than hypertension which he is NOT on any medications. He mentions he has white coat hypertension.  CHADsVAsc score is only 1 (possible hypertension). He is actually NOT indicated for anticoagulation. He can get baby aspirin. I did speak for neurosurgery; if anticoagulation is indicated ok to start. For now he will follow up with his Cardiologist.     Hospitalist medicine service will sign off. Please call with any questions or concerns.        "

## 2021-12-07 NOTE — CARE PLAN
The patient is Stable - Low risk of patient condition declining or worsening    Shift Goals  Clinical Goals: Monitor hemovac drain and urinary output  Patient Goals: Rest  Family Goals: Rest, recovery    Progress made toward(s) clinical / shift goals:        Problem: Knowledge Deficit - Standard  Goal: Patient and family/care givers will demonstrate understanding of plan of care, disease process/condition, diagnostic tests and medications  Outcome: Progressing     Problem: Communication  Goal: The ability to communicate needs accurately and effectively will improve  Outcome: Progressing     Problem: Respiratory  Goal: Patient will achieve/maintain optimum respiratory ventilation and gas exchange  Outcome: Progressing

## 2021-12-07 NOTE — THERAPY
Physical Therapy   Daily Treatment     Patient Name: Edgar Jackson  Age:  61 y.o., Sex:  male  Medical Record #: [id number]  Today's Date: 12/7/2021     Precautions  Precautions: Spinal / Back Precautions   Comments: no brace ordered    Assessment    Patient progressed to meet all functional mobility goals.  He was able to perform supine > sit without bed features with SPV with cues for log roll.  Patient with improved gait today, ambulating ~250 ft with FWW and SPV.  Patient continues to have anterior pelvic tilt with slight flexed posture during ambulation due to surgical site pain.  Patient was able to negotiate a FOS with single rail and SPV.  Patient has no further acute PT needs.  Recommend FWW and outpatient PT once cleared by surgeon.    Plan    Discharge secondary to goals met.    DC Equipment Recommendations: Front-Wheel Walker  Discharge Recommendations: Recommend outpatient physical therapy services to address higher level deficits (when cleared by surgeon)     Objective     12/07/21 0833   Cognition    Cognition / Consciousness WDL   Level of Consciousness Alert   Comments Pleasant & cooperative   Balance   Sitting Balance (Static) Good   Sitting Balance (Dynamic) Fair +   Standing Balance (Static) Fair +   Standing Balance (Dynamic) Fair   Weight Shift Sitting Good   Weight Shift Standing Good   Skilled Intervention Verbal Cuing   Comments w/ FWW   Gait Analysis   Gait Level Of Assist Supervised   Assistive Device Front Wheel Walker   Distance (Feet) 250   # of Times Distance was Traveled 1   Deviation Bradykinetic;Shuffled Gait   # of Stairs Climbed (FOS)   Level of Assist with Stairs Supervised   Skilled Intervention Verbal Cuing;Sequencing   Comments Cues for sequencing stairs   Bed Mobility    Supine to Sit Supervised   Sit to Supine (NT, left up in chair)   Scooting Supervised (seated EOB)   Rolling Supervised   Skilled Intervention Verbal Cuing   Comments cues for log roll, HOB flat    Functional Mobility   Sit to Stand Supervised   Bed, Chair, Wheelchair Transfer Supervised   Transfer Method Stand Step   Mobility ambulation, stairs   Skilled Intervention Verbal Cuing   Activity Tolerance   Sitting in Chair 10+ min (post session)   Sitting Edge of Bed 2 min   Standing 8 min   Short Term Goals    Short Term Goal # 1 Pt will perform supine <> sit without bed features via log roll within 6 visits in order to progress toward PLOF   Goal Outcome # 1 Goal met   Short Term Goal # 2 Pt will perform STS/functional transfers with FWW and SPV within 6 visits in order to progress OOB activity   Goal Outcome # 2 Goal met   Short Term Goal # 3 Pt will ambulate 200 ft with FWW and SPV within 6 visits in order to progress toward PLOF   Goal Outcome # 3 Goal met   Short Term Goal # 4 Pt will negotiate 2 steps within 6 visits in order to access home environment   Goal Outcome # 4 Goal met   Session Information   Date / Session Number  12/7 - 2 DC PT

## 2021-12-07 NOTE — PROGRESS NOTES
Monitoring Summary:  Rhythm: SR with 1st degree AV block 61-71 bpm  Ectopy: none  Measurements: 0.21/0.0.7/0.51

## 2021-12-07 NOTE — PROGRESS NOTES
Med Rec completed per patient   Allergies reviewed  No ORAL antibiotics in last 30 days    Patient is not currently taking any daily prescription medications

## 2021-12-07 NOTE — CARE PLAN
The patient is Stable - Low risk of patient condition declining or worsening    Shift Goals  Clinical Goals: monitor hemovac, pain management  Patient Goals: rest, control pain  Family Goals: Rest, recovery    Progress made toward(s) clinical / shift goals:    Problem: Knowledge Deficit - Standard  Goal: Patient and family/care givers will demonstrate understanding of plan of care, disease process/condition, diagnostic tests and medications  Outcome: Progressing  Note: Patient understands about calling for help. Patient understands to let the nursing staff know if his dressing on his back feels wet. Patient engages with the plan of care. Patient has no questions at this time.        Patient is not progressing towards the following goals:      Problem: Urinary Elimination  Goal: Establish and maintain regular urinary output  Outcome: Not Progressing  Note: Patient is having some retention. Bladder scan in use. Will continue to monitor.     Problem: Pain - Standard  Goal: Alleviation of pain or a reduction in pain to the patient’s comfort goal  Outcome: Not Progressing  Note: Patient is having surgical pain. Patient medicated per MAR. Will continue to monitor.

## 2021-12-07 NOTE — PROGRESS NOTES
Received bedside report from RN Beth, pt care assumed. VSS, pt assessment complete. Pt A&Ox4, c/o 3/10 back pain at this time. POC discussed with pt and verbalizes no questions. Pt denies any additional needs at this time. Bed locked and in lowest position. Pt educated on fall risk and verbalized understanding, call light within reach, hourly rounding initiated.

## 2021-12-07 NOTE — THERAPY
"Occupational Therapy   Initial Evaluation     Patient Name: Edgar Jackson  Age:  61 y.o., Sex:  male  Medical Record #: [id number]  Today's Date: 12/6/2021       Precautions: Spinal / Back Precautions   Comments: no brace ordered    Assessment  Patient is 61 y.o. male with a diagnosis of low back pain with radiculaopathy.  Pt is s/p L2-5 laminectomies.  Additional factors influencing patient status / progress: Pt developed A flutter & had A fib in the past with an Ablation.  Today pt was seen with his wife present & both were educated on spinal precautions during ADL's & homemaking tasks & provided with a written handout.  Pt able to complete basic ADL's with supervision & did better with FWW.  Pt should continue to progress well & was advised to be OOB for meals & amb in halls with staff.  Pt has no further Acute OT needs.    Plan    Recommend Occupational Therapy for Evaluation only.    DC Equipment Recommendations: Front-Wheel Walker  Discharge Recommendations: Anticipate that the patient will have no further occupational therapy needs after discharge from the hospital     Subjective    \"I feel better than I did this am\"     Objective       12/06/21 1254   Prior Living Situation   Prior Services None   Housing / Facility 2 Story House   Steps Into Home 2   Steps In Home 12   Rail Right Rail  (Steps in Home)   Elevator No   Bathroom Set up Walk In Shower;Bathtub / Shower Combination   Equipment Owned Hand Held Shower;Tub / Shower Seat   Lives with - Patient's Self Care Capacity Spouse;Adult Children  (19 yr son)   Comments Pt reports his wif & son can assist if needed   Cognition    Comments pleasant & recetive to new learning   Balance Assessment   Sitting Balance (Static) Good   Sitting Balance (Dynamic) Fair +   Standing Balance (Static) Fair +   Standing Balance (Dynamic) Fair   Weight Shift Sitting Good   Weight Shift Standing Good   Comments pt did better with FWW   Bed Mobility    Supine to Sit " Supervised   Sit to Supine   (pt left up in chair for lunch)   Scooting Supervised   Rolling Supervised   ADL Assessment   Eating Modified Independent   Grooming Supervision;Standing   Upper Body Dressing Modified Independent   Lower Body Dressing Supervision   Toileting Supervision   Functional Mobility   Sit to Stand Supervised   Bed, Chair, Wheelchair Transfer Supervised   Toilet Transfers Supervised

## 2021-12-07 NOTE — PROGRESS NOTES
Neurosurgery Progress Note    Subjective:  POD#2 L2-5 laminectomies  Transferred to [facility] for Afib/flutter perioperatively  C/o tolerable back pain today  Numbness to lateral left calf and top of left foot  Unsteady when OOB, using FWW  Urinating more frequently and with less difficulty since Flomax and Urecholine started  Drain output 70cc last 8 hrs    Exam:  Pleasant and cooperative  Dressing clean, dry  L AT/EHL 4+/5, otherwise strength 5/5 throughout  Diminished sensation to L5 dermatome of left calf and foot    BP  Min: 122/78  Max: 138/73  Pulse  Av.3  Min: 60  Max: 84  Resp  Av.8  Min: 15  Max: 18  Temp  Av.1 °C (98.8 °F)  Min: 36.3 °C (97.4 °F)  Max: 37.6 °C (99.7 °F)  SpO2  Av.2 %  Min: 90 %  Max: 95 %    No data recorded    Recent Labs     21   WBC 8.5   RBC 4.46*   HEMOGLOBIN 13.9*   HEMATOCRIT 43.4   MCV 97.3   MCH 31.2   MCHC 32.0*   RDW 46.3   PLATELETCT 165   MPV 9.6     Recent Labs     21   SODIUM 140   POTASSIUM 4.7   CHLORIDE 103   CO2 26   GLUCOSE 149*   BUN 11   CREATININE 1.03   CALCIUM 9.0               Intake/Output                       21 0700 - 21 0659 21 0700 - 21 0659     8208-4405 1065-5749 Total 1074-8360 5302-0566 Total                 Intake    P.O.  240  -- 240  --  -- --    P.O. 240 -- 240 -- -- --    Total Intake 240 -- 240 -- -- --       Output    Urine  75  375 450  --  -- --    Urine Void (mL) 75 375 450 -- -- --    Drains  225  70 295  --  -- --    Output (mL) (Closed/Suction Drain 1 Posterior Back Hemovac) 225 70 295 -- -- --    Total Output 300 445 745 -- -- --       Net I/O     -60 -445 -505 -- -- --            Intake/Output Summary (Last 24 hours) at 2021 0911  Last data filed at 2021 0500  Gross per 24 hour   Intake 120 ml   Output 745 ml   Net -625 ml       $ Bladder Scan Results (mL): 257    • aspirin EC  81 mg DAILY   • tamsulosin  0.4 mg DAILY   • bethanechol  10 mg TID   • methocarbamol  750  mg Q8HRS PRN   • Pharmacy Consult Request  1 Each PHARMACY TO DOSE   • MD ALERT...DO NOT ADMINISTER NSAIDS or ASPIRIN unless ORDERED By Neurosurgery  1 Each PRN   • docusate sodium  100 mg BID   • senna-docusate  1 Tablet Nightly   • senna-docusate  1 Tablet Q24HRS PRN   • polyethylene glycol/lytes  1 Packet BID PRN   • magnesium hydroxide  30 mL QDAY PRN   • bisacodyl  10 mg Q24HRS PRN   • sodium phosphate  1 Each Once PRN   • enoxaparin  40 mg DAILY AT 1800   • diphenhydrAMINE  25 mg Q6HRS PRN    Or   • diphenhydrAMINE  25 mg Q6HRS PRN   • ondansetron  4 mg Q4HRS PRN   • ondansetron  4 mg Q4HRS PRN   • promethazine  12.5-25 mg Q4HRS PRN   • promethazine  12.5-25 mg Q4HRS PRN   • prochlorperazine  5-10 mg Q4HRS PRN   • ALPRAZolam  0.25 mg BID PRN   • labetalol  10 mg Q HOUR PRN   • benzocaine-menthol  1 Lozenge Q2HRS PRN   • calcium carbonate  500 mg BID   • HYDROcodone/acetaminophen  1 Tablet Q4HRS PRN   • oxyCODONE immediate-release  5 mg Q4HRS PRN   • HYDROmorphone  0.5 mg Q3HRS PRN       Assessment and Plan:  Hospital day #3  POD #2  Appreciate Hospitalist consult for Afib/flutter, they plan no intervention at this time  Urinary retention, continue Urecholine and Flomax  Keep drain in until 30cc or less in 8 hour period  Pain control  Ice  Mobilize, PT/OT  Likely drain out and home tomorrow    Prophylactic anticoagulation: yes         Start date/time: today

## 2021-12-07 NOTE — PROGRESS NOTES
MD Mayberry updated on patient's spinal dressing being saturated and leaking sanguinous drainage. Verbal orders to change dressing with ABD pad and to place hemovac to half bulb suction. Will place orders.

## 2021-12-07 NOTE — PROGRESS NOTES
Assumed care this pm from day shift RN. Patient walking around the unit. Patient AxO 4, O2 @ RA, VSS. Call bell and personal items within reach, bed locked in low position. Bed exit alarm off, patient is up self, calls appropriately. Hourly rounding in place. Tele box in place, monitor room notified.

## 2021-12-08 VITALS
DIASTOLIC BLOOD PRESSURE: 78 MMHG | WEIGHT: 214.51 LBS | BODY MASS INDEX: 27.53 KG/M2 | SYSTOLIC BLOOD PRESSURE: 127 MMHG | HEIGHT: 74 IN | HEART RATE: 67 BPM | RESPIRATION RATE: 18 BRPM | OXYGEN SATURATION: 92 % | TEMPERATURE: 97.7 F

## 2021-12-08 PROCEDURE — 700102 HCHG RX REV CODE 250 W/ 637 OVERRIDE(OP): Performed by: PHYSICIAN ASSISTANT

## 2021-12-08 PROCEDURE — A9270 NON-COVERED ITEM OR SERVICE: HCPCS | Performed by: INTERNAL MEDICINE

## 2021-12-08 PROCEDURE — A9270 NON-COVERED ITEM OR SERVICE: HCPCS | Performed by: PHYSICIAN ASSISTANT

## 2021-12-08 PROCEDURE — 700102 HCHG RX REV CODE 250 W/ 637 OVERRIDE(OP): Performed by: INTERNAL MEDICINE

## 2021-12-08 RX ORDER — BETHANECHOL CHLORIDE 10 MG/1
10 TABLET ORAL 3 TIMES DAILY
Qty: 30 TABLET | Refills: 0 | Status: SHIPPED | OUTPATIENT
Start: 2021-12-08 | End: 2023-01-17

## 2021-12-08 RX ORDER — CEPHALEXIN 500 MG/1
500 CAPSULE ORAL 4 TIMES DAILY
Qty: 20 CAPSULE | Refills: 0 | Status: SHIPPED | OUTPATIENT
Start: 2021-12-08 | End: 2023-01-17

## 2021-12-08 RX ORDER — TAMSULOSIN HYDROCHLORIDE 0.4 MG/1
0.4 CAPSULE ORAL DAILY
Qty: 10 CAPSULE | Refills: 0 | Status: SHIPPED | OUTPATIENT
Start: 2021-12-09 | End: 2023-01-17

## 2021-12-08 RX ORDER — METHOCARBAMOL 750 MG/1
750 TABLET, FILM COATED ORAL 3 TIMES DAILY PRN
Qty: 90 TABLET | Refills: 0 | Status: SHIPPED | OUTPATIENT
Start: 2021-12-08 | End: 2023-01-17

## 2021-12-08 RX ORDER — OXYCODONE HYDROCHLORIDE 5 MG/1
5 TABLET ORAL EVERY 4 HOURS PRN
Qty: 84 TABLET | Refills: 0 | Status: SHIPPED | OUTPATIENT
Start: 2021-12-08 | End: 2021-12-22

## 2021-12-08 RX ADMIN — MAGNESIUM CITRATE 296 ML: 1.75 LIQUID ORAL at 11:02

## 2021-12-08 RX ADMIN — OXYCODONE 5 MG: 5 TABLET ORAL at 09:09

## 2021-12-08 RX ADMIN — BETHANECHOL CHLORIDE 10 MG: 10 TABLET ORAL at 11:04

## 2021-12-08 RX ADMIN — CALCIUM CARBONATE 500 MG: 500 TABLET, CHEWABLE ORAL at 04:48

## 2021-12-08 RX ADMIN — OXYCODONE 5 MG: 5 TABLET ORAL at 04:53

## 2021-12-08 RX ADMIN — BETHANECHOL CHLORIDE 10 MG: 10 TABLET ORAL at 04:48

## 2021-12-08 RX ADMIN — OXYCODONE 5 MG: 5 TABLET ORAL at 16:13

## 2021-12-08 RX ADMIN — TAMSULOSIN HYDROCHLORIDE 0.4 MG: 0.4 CAPSULE ORAL at 04:48

## 2021-12-08 RX ADMIN — DOCUSATE SODIUM 100 MG: 100 CAPSULE ORAL at 04:47

## 2021-12-08 RX ADMIN — MAGNESIUM HYDROXIDE 30 ML: 400 SUSPENSION ORAL at 04:48

## 2021-12-08 RX ADMIN — OXYCODONE 5 MG: 5 TABLET ORAL at 00:06

## 2021-12-08 RX ADMIN — ASPIRIN 81 MG: 81 TABLET, COATED ORAL at 04:48

## 2021-12-08 ASSESSMENT — PAIN DESCRIPTION - PAIN TYPE
TYPE: SURGICAL PAIN

## 2021-12-08 NOTE — DISCHARGE INSTRUCTIONS
Discharge Instructions    Discharged to home by car with relative. Discharged via wheelchair, hospital escort: Yes.  Special equipment needed: Walker    Be sure to schedule a follow-up appointment with your primary care doctor or any specialists as instructed.     Discharge Plan:   Diet Plan: Discussed  Activity Level: Discussed  Confirmed Follow up Appointment: Patient to Call and Schedule Appointment  Confirmed Symptoms Management: Discussed  Medication Reconciliation Updated: Yes    I understand that a diet low in cholesterol, fat, and sodium is recommended for good health. Unless I have been given specific instructions below for another diet, I accept this instruction as my diet prescription.   Other diet: as tolerated    · Is patient discharged on Warfarin / Coumadin?   No     Depression / Suicide Risk    As you are discharged from this [facility] facility, it is important to learn how to keep safe from harming yourself.  Atrial Fibrillation    Atrial fibrillation is a type of heartbeat that is irregular or fast (rapid). If you have this condition, your heart beats without any order. This makes it hard for your heart to pump blood in a normal way. Having this condition gives you more risk for stroke, heart failure, and other heart problems.  Atrial fibrillation may start all of a sudden and then stop on its own, or it may become a long-lasting problem.  What are the causes?  This condition may be caused by heart conditions, such as:  High blood pressure.  Heart failure.  Heart valve disease.  Heart surgery.  Other causes include:  Pneumonia.  Obstructive sleep apnea.  Lung cancer.  Thyroid disease.  Drinking too much alcohol.  Sometimes the cause is not known.  What increases the risk?  You are more likely to develop this condition if:  You smoke.  You are older.  You have diabetes.  You are overweight.  You have a family history of this condition.  You exercise often and hard.  What are the signs or  "symptoms?  Common symptoms of this condition include:  A feeling like your heart is beating very fast.  Chest pain.  Feeling short of breath.  Feeling light-headed or weak.  Getting tired easily.  Follow these instructions at home:  Medicines  Take over-the-counter and prescription medicines only as told by your doctor.  If your doctor gives you a blood-thinning medicine, take it exactly as told. Taking too much of it can cause bleeding. Taking too little of it does not protect you against clots. Clots can cause a stroke.  Lifestyle         Do not use any tobacco products. These include cigarettes, chewing tobacco, and e-cigarettes. If you need help quitting, ask your doctor.  Do not drink alcohol.  Do not drink beverages that have caffeine. These include coffee, soda, and tea.  Follow diet instructions as told by your doctor.  Exercise regularly as told by your doctor.  General instructions  If you have a condition that causes breathing to stop for a short period of time (apnea), treat it as told by your doctor.  Keep a healthy weight. Do not use diet pills unless your doctor says they are safe for you. Diet pills may make heart problems worse.  Keep all follow-up visits as told by your doctor. This is important.  Contact a doctor if:  You notice a change in the speed, rhythm, or strength of your heartbeat.  You are taking a blood-thinning medicine and you see more bruising.  You get tired more easily when you move or exercise.  You have a sudden change in weight.  Get help right away if:    You have pain in your chest or your belly (abdomen).  You have trouble breathing.  You have blood in your vomit, poop, or pee (urine).  You have any signs of a stroke. \"BE FAST\" is an easy way to remember the main warning signs:  B - Balance. Signs are dizziness, sudden trouble walking, or loss of balance.  E - Eyes. Signs are trouble seeing or a change in how you see.  F - Face. Signs are sudden weakness or loss of feeling in " the face, or the face or eyelid drooping on one side.  A - Arms. Signs are weakness or loss of feeling in an arm. This happens suddenly and usually on one side of the body.  S - Speech. Signs are sudden trouble speaking, slurred speech, or trouble understanding what people say.  T - Time. Time to call emergency services. Write down what time symptoms started.  You have other signs of a stroke, such as:  A sudden, very bad headache with no known cause.  Feeling sick to your stomach (nausea).  Throwing up (vomiting).  Jerky movements you cannot control (seizure).  These symptoms may be an emergency. Do not wait to see if the symptoms will go away. Get medical help right away. Call your local emergency services (911 in the [place] Do not drive yourself to the hospital.  Summary  Atrial fibrillation is a type of heartbeat that is irregular or fast (rapid).  You are at higher risk of this condition if you smoke, are older, have diabetes, or are overweight.  Follow your doctor's instructions about medicines, diet, exercise, and follow-up visits.  Get help right away if you think that you have signs of a stroke.  This information is not intended to replace advice given to you by your health care provider. Make sure you discuss any questions you have with your health care provider.  Document Released: 09/26/2009 Document Revised: 02/21/2019 Document Reviewed: 02/08/2019  Elsevier Patient Education © 2020 [employer] Inc.    Lumbar Laminectomy, Care After  Refer to this sheet in the next few weeks. These instructions provide you with information on caring for yourself after your procedure. Your health care provider may also give you more specific instructions. Your treatment has been planned according to current medical practices, but problems sometimes occur. Call your health care provider if you have any problems or questions after your procedure.  HOME CARE INSTRUCTIONS   · Check the incision twice a day for signs of  infection. Some signs may include a foul-smelling, greenish or yellowish discharge from the wound, increased pain, or increased redness over the incision.  · Change your bandages about 24-36 hours after surgery, or as directed.  · You may shower once the bandage is removed, or as directed. Avoid tub baths, swimming, and hot tubs for 3 weeks or until your incision has healed completely. If you have stitches or staples, they may be removed 2-3 weeks after surgery, or as directed by your doctor.  · Daily exercise is helpful to prevent the return of problems. Walking is permitted. You may use a treadmill without an incline. Cut down on activities and exercise if you have discomfort. You may also go up and down stairs as much as you can tolerate.  · Do not lift anything heavier than 15 lb. Avoid bending or twisting at the waist. Always bend your knees.  · Maintain strength and range of motion as instructed.  · Do not drive for 2-3 weeks, or as directed by your doctor. You may be a passenger for 20-30 minutes at a time. Lying back in the passenger seat may be more comfortable for you.  · Limit your sitting to intervals of 20-30 minutes. You should lie down or walk in between sitting periods. There are no limitations for sitting in a recliner chair.  · Only take over-the-counter or prescription medicines for pain, discomfort, or fever as directed by your health care provider.  SEEK MEDICAL CARE IF:   · There is increased bleeding (more than a small spot) from the wound.  · You notice redness, swelling, or increasing pain in the wound.  · Pus is coming from the wound.  · You have a fever for more than 2-3 days.  · You notice a foul smell coming from the wound or dressing.  · You have increasing pain in your wound.  SEEK IMMEDIATE MEDICAL CARE IF:   · You develop a rash.  · You have difficulty breathing.  · You have any allergic problems.  · You develop a headache or stiff neck that does not respond to pain relievers.  · You  are unable to urinate.  · You develop new onset of pain, numbness, or weakness in the buttocks or lower extremities.  MAKE SURE YOU:   · Understand these instructions.  · Will watch your condition.  · Will get help right away if you are not doing well or get worse.     This information is not intended to replace advice given to you by your health care provider. Make sure you discuss any questions you have with your health care provider.     Document Released: 11/21/2005 Document Revised: 08/20/2014 Document Reviewed: 05/15/2014  [employer] Interactive Patient Education ©2016 [employer]vier Inc.      Recognize the warning signs:  · Abrupt changes in personality, positive or negative- including increase in energy   · Giving away possessions  · Change in eating patterns- significant weight changes-  positive or negative  · Change in sleeping patterns- unable to sleep or sleeping all the time   · Unwillingness or inability to communicate  · Depression  · Unusual sadness, discouragement and loneliness  · Talk of wanting to die  · Neglect of personal appearance   · Rebelliousness- reckless behavior  · Withdrawal from people/activities they love  · Confusion- inability to concentrate     If you or a loved one observes any of these behaviors or has concerns about self-harm, here's what you can do:  · Talk about it- your feelings and reasons for harming yourself  · Remove any means that you might use to hurt yourself (examples: pills, rope, extension cords, firearm)  · Get professional help from the community (Mental Health, Substance Abuse, psychological counseling)  · Do not be alone:Call your Safe Contact- someone whom you trust who will be there for you.  · Call your local CRISIS HOTLINE 418-5039 or 623-615-7222  · Call your local Children's Mobile Crisis Response Team [place] (763) 335-6028 or www.[employer]  · Call the toll free National Suicide Prevention Hotlines   · National Suicide Prevention Lifeline  642-629-TAQU (2467)  · National [place] Line Network 800-SUICIDE (733-7113)

## 2021-12-08 NOTE — PROGRESS NOTES
Handoff report received. Assumed patient care. PT is sitting up in bed, AAOx4, complaints of back discomfort, radiating to right leg and foot rated 3/10. Reviewed medication administration. Tele box is off, patient is medical. VSS stable,  POC discussed with PT and all questions addressed. Safety precautions in place. Call light and personal belongings within reach. Educated to call for assistance if needed.

## 2021-12-08 NOTE — DISCHARGE PLANNING
Anticipated Discharge Disposition: Home when clear    Action: RN CM met with the patient at bedside to obtain DME choice for the FWW.  Patient provided choice for [facility].  Choice form faxed to JONATHAN Armenta.  Order placed for traction to deliver the walker.    Barriers to Discharge: Surgical clearance     Plan: Case coordination to continue to follow up with medical team to discuss discharge barriers.

## 2021-12-08 NOTE — PROGRESS NOTES
Assumed care this pm from day shift RN. Patient sitting up in the chair with no signs of distress. Patient AxO 4, O2 @ RA , VSS. Call bell and personal items within reach, bed locked in low position. Bed exit alarm off, patient up self, patient calls appropriately. Hourly rounding in place. Patient is medical, monitor room notified.

## 2021-12-08 NOTE — DISCHARGE SUMMARY
DATE OF ADMISSION:  12/05/2021     DISCHARGE DIAGNOSES:  1.  Low back pain.  2.  Bilateral lower extremity pain.     SURGERY PERFORMED:  Open L2-L5 laminectomies, performed by Dr. Placido Marie.     COMPLICATIONS:  None.     REASON FOR ADMISSION:  This is a very pleasant 61-year-old male who gives   history of progressive low back and bilateral lower extremity pain,   paresthesias and weakness that are worse with walking and standing upright and   has symptoms consistent with neurogenic claudication.  His preoperative   workup showed significant degenerative disk disease at L2-3, L3-4, and L4-5   with broad-based disk bulging and bilateral facet and ligamentous hypertrophy   with large disk bulging at L2-3 and L4-5.  The patient was hereby indicated   for the above surgery.     HOSPITAL COURSE:  The patient was admitted on the date of surgery.  He   underwent the above surgery without complication. In the postoperative   anesthesia care unit he was found to be in atrial flutter.  A hospitalist   consult was obtained.  The patient was in no distress.  He was essentially   asymptomatic from this as he did not have any chest pain, shortness of breath   or other issues.  Hospitalists were consulted and followed closely with the   patient while he was in-house.  Here now on postoperative day #3, the patient   is in normal sinus rhythm.  He denies chest pain or shortness of breath or   other new cardiac complaints.  He has undergone no interventions for his   atrial flutter.  He has been well from his lumbar surgery as well.  He is   ambulating.  He is voiding.  He is tolerating oral food and medications fine.    His drain still has elevated output at 80 mL, this was put to zero   compression and rechecked this afternoon.  If this is meeting criteria, he   will have the DRE drain discharge, likewise if he has not had a bowel movement   for several days, then an aggressive bowel regimen will be enforced today.  He   will be  cleared for discharge home under stable condition after relatively   uneventful hospital stay.     DISCHARGE INSTRUCTIONS:  The patient is to eat a normal diet as tolerated.  He   is to walk as much as tolerated.  He is to avoid repetitive bending at the   waist.  He is to avoid lifting, pushing, pulling greater than 15 pounds.  It   will be okay for the patient to drive in 2 weeks' time or when he is   comfortable, not taking narcotic pain medications.  He should take an   over-the-counter stool softener while taking narcotic pain medications.  He   should avoid NSAIDs for a week.  He is encouraged to ice his incision for   10-15 minutes multiple times a day.  He does have followup appointments in the   office of [facility] in 2 and 6 weeks' time.  He is to keep those   appointments.  He should call our office or go to the nearest emergency   department with any emergent changes.  The patient was given these discharge   instructions verbally and he voiced understanding of them.     DISCHARGE MEDICATIONS:  In addition to the patient's normal home medications,   he will be discharged home with:  1.  Oxycodone 5 mg 1 tab p.o. q. 4-6 hours p.r.n. pain, dispensed #84, no   refills.  2.  Robaxin 750 mg 1 tab p.o. t.i.d. p.r.n. spasm, dispensed #90, no refills.  3.  Keflex 500 mg 1 tab p.o. q.i.d. x5 days, dispensed #20, no refills.  4.  Urecholine 10 mg 1 tab p.o. t.i.d. for 10 days, dispensed #30, no refills.  5.  Flomax 0.4 mg 1 tab p.o. every day for 10 days, dispensed #10, no refills.        ______________________________  MACARIO YEBOAH PA-C        ______________________________  MD SUKHI CAMPBELL/NILAM    DD:  12/08/2021 10:07  DT:  12/08/2021 10:31    Job#:  [id number]

## 2021-12-08 NOTE — CARE PLAN
The patient is Stable - Low risk of patient condition declining or worsening    Shift Goals  Clinical Goals: monitor drain output, pain management  Patient Goals: rest  Family Goals: Rest, recovery    Progress made toward(s) clinical / shift goals:    Problem: Knowledge Deficit - Standard  Goal: Patient and family/care givers will demonstrate understanding of plan of care, disease process/condition, diagnostic tests and medications  Outcome: Progressing  Note: Patient understands to call the nursing staff if he feels like his dressing is getting saturated. Patient call appropriately for help. Tasneemn understands the plan of care. Patient involved with care. All questions answered at this time        Patient is not progressing towards the following goals:      Problem: Pain - Standard  Goal: Alleviation of pain or a reduction in pain to the patient’s comfort goal  Outcome: Not Progressing  Note: Patient has surgical pain. Patient medicated per MAR. Pain well controlled.

## 2021-12-08 NOTE — DISCHARGE PLANNING
Received Choice form at 1018  Agency/Facility Name: [facility]   Referral sent per Choice form @ [phone]

## 2021-12-08 NOTE — PROGRESS NOTES
Neurosurgery Progress Note    Subjective:  POD#3 L2-5 laminectomies  Transferred to [facility] for Afib/flutter perioperatively  C/o tolerable back pain today  Numbness to lateral left calf and top of left foot  Unsteady when OOB, using FWW  Urinating more frequently and with less difficulty since Flomax and Urecholine started  Drain output 80cc last 8 hrs  No BM for several days.   Hospitalists have signed off.     Exam:  Pleasant and cooperative  Dressing clean, dry  L AT/EHL 4+/5, otherwise strength 5/5 throughout  Diminished sensation to L5 dermatome of left calf and foot    BP  Min: 127/78  Max: 147/71  Pulse  Av.4  Min: 67  Max: 80  Resp  Av  Min: 18  Max: 18  Temp  Av.8 °C (98.3 °F)  Min: 36.5 °C (97.7 °F)  Max: 37.1 °C (98.8 °F)  SpO2  Av.6 %  Min: 90 %  Max: 95 %    No data recorded    Recent Labs     21   WBC 8.5   RBC 4.46*   HEMOGLOBIN 13.9*   HEMATOCRIT 43.4   MCV 97.3   MCH 31.2   MCHC 32.0*   RDW 46.3   PLATELETCT 165   MPV 9.6     Recent Labs     21   SODIUM 140   POTASSIUM 4.7   CHLORIDE 103   CO2 26   GLUCOSE 149*   BUN 11   CREATININE 1.03   CALCIUM 9.0               Intake/Output                       21 0700 - 21 0659 21 0700 - 21 0659     [id number] 0735-9622 Total 1900-0659 Total                 Intake    Total Intake -- -- -- -- -- --       Output    Drains  60  -- 60  80  -- 80    Output (mL) (Closed/Suction Drain 1 Posterior Back Hemovac) 60 -- 60 80 -- 80    Total Output 60 -- 60 80 -- 80       Net I/O     -60 -- -60 -80 -- -80            Intake/Output Summary (Last 24 hours) at 2021 0957  Last data filed at 2021 0700  Gross per 24 hour   Intake --   Output 140 ml   Net -140 ml            • aspirin EC  81 mg DAILY   • tamsulosin  0.4 mg DAILY   • bethanechol  10 mg TID   • methocarbamol  750 mg Q8HRS PRN   • Pharmacy Consult Request  1 Each PHARMACY TO DOSE   • MD ALERT...DO NOT ADMINISTER NSAIDS or ASPIRIN  unless ORDERED By Neurosurgery  1 Each PRN   • docusate sodium  100 mg BID   • senna-docusate  1 Tablet Nightly   • senna-docusate  1 Tablet Q24HRS PRN   • polyethylene glycol/lytes  1 Packet BID PRN   • magnesium hydroxide  30 mL QDAY PRN   • bisacodyl  10 mg Q24HRS PRN   • sodium phosphate  1 Each Once PRN   • enoxaparin  40 mg DAILY AT 1800   • diphenhydrAMINE  25 mg Q6HRS PRN    Or   • diphenhydrAMINE  25 mg Q6HRS PRN   • ondansetron  4 mg Q4HRS PRN   • ondansetron  4 mg Q4HRS PRN   • promethazine  12.5-25 mg Q4HRS PRN   • promethazine  12.5-25 mg Q4HRS PRN   • prochlorperazine  5-10 mg Q4HRS PRN   • ALPRAZolam  0.25 mg BID PRN   • labetalol  10 mg Q HOUR PRN   • benzocaine-menthol  1 Lozenge Q2HRS PRN   • calcium carbonate  500 mg BID   • HYDROcodone/acetaminophen  1 Tablet Q4HRS PRN   • oxyCODONE immediate-release  5 mg Q4HRS PRN   • HYDROmorphone  0.5 mg Q3HRS PRN       Assessment and Plan:  Hospital day #4  POD #3  Appreciate Hospitalist consult for Afib/flutter, they plan no intervention at this time  Urinary retention, continue Urecholine and Flomax  Drain to zero compression now and recheck in 8 hours.  If output 40 cc or less then d/c drain and d/c patient home.  Aggressive BM regimen this AM.  Call with questions.     Prophylactic anticoagulation: yes         Start date/time: today

## 2021-12-08 NOTE — CARE PLAN
Problem: Knowledge Deficit - Standard  Goal: Patient and family/care givers will demonstrate understanding of plan of care, disease process/condition, diagnostic tests and medications  Outcome: Progressing  Note: Patient is educated of disease process and condition. Treatment team has included patient in plan of care. All medications indications and side effects are explained. Patient is encouraged to ask questions. Patient indicates understanding.      Problem: Psychosocial  Goal: Patient's level of anxiety will decrease  Flowsheets (Taken 12/8/2021 8113)  Decrease Anxiety Level: Collaborated with patient to identify and develop coping strategies  Patient Behaviors: Restless  Note: Patient is assessed appropriately. Patient is helped to identify factors for stress and anxiety. Patient is taught to deep breathe and other forms of stress/anxiety management. Patient verbalizes understanding. Will continue to monitor.    The patient is Stable - Low risk of patient condition declining or worsening    Shift Goals  Clinical Goals: monitor drain output, pain management  Patient Goals: rest  Family Goals: Rest, recovery    Progress made toward(s) clinical / shift goals:  ongoing    Patient is not progressing towards the following goals:

## 2021-12-09 NOTE — PROGRESS NOTES
Pt given discharge instructions.  Discussed diet, activity, follow up appointments, symptoms and management, and prescriptions provided.  Packet sent with patient.  IV d/c'd, tele box off, and all questions answered.

## 2022-06-21 ENCOUNTER — OFFICE VISIT (OUTPATIENT)
Dept: URGENT CARE | Facility: CLINIC | Age: 62
End: 2022-06-21
Payer: COMMERCIAL

## 2022-06-21 VITALS
RESPIRATION RATE: 20 BRPM | BODY MASS INDEX: 26.95 KG/M2 | SYSTOLIC BLOOD PRESSURE: 128 MMHG | TEMPERATURE: 98.4 F | HEIGHT: 74 IN | HEART RATE: 84 BPM | WEIGHT: 210 LBS | DIASTOLIC BLOOD PRESSURE: 66 MMHG | OXYGEN SATURATION: 98 %

## 2022-06-21 DIAGNOSIS — J01.00 ACUTE NON-RECURRENT MAXILLARY SINUSITIS: ICD-10-CM

## 2022-06-21 PROCEDURE — 99203 OFFICE O/P NEW LOW 30 MIN: CPT | Performed by: PHYSICIAN ASSISTANT

## 2022-06-21 RX ORDER — AMOXICILLIN AND CLAVULANATE POTASSIUM 875; 125 MG/1; MG/1
1 TABLET, FILM COATED ORAL 2 TIMES DAILY
Qty: 14 TABLET | Refills: 0 | Status: SHIPPED | OUTPATIENT
Start: 2022-06-21 | End: 2022-06-28

## 2022-06-21 RX ORDER — FLUTICASONE PROPIONATE 50 MCG
2 SPRAY, SUSPENSION (ML) NASAL DAILY
Qty: 16 G | Refills: 0 | Status: SHIPPED | OUTPATIENT
Start: 2022-06-21 | End: 2023-01-17

## 2022-06-21 ASSESSMENT — ENCOUNTER SYMPTOMS
SHORTNESS OF BREATH: 0
NAUSEA: 0
ABDOMINAL PAIN: 0
COUGH: 0
PALPITATIONS: 0
SPUTUM PRODUCTION: 0
WHEEZING: 0
HEADACHES: 0
SINUS PAIN: 1
SORE THROAT: 0
CHILLS: 0
MYALGIAS: 0
DIARRHEA: 0
DIAPHORESIS: 0
VOMITING: 0
DIZZINESS: 0
FEVER: 0

## 2022-06-21 ASSESSMENT — FIBROSIS 4 INDEX: FIB4 SCORE: 1.727272727272727273

## 2022-06-21 NOTE — PROGRESS NOTES
Subjective:     CHIEF COMPLAINT  Chief Complaint   Patient presents with   • Sinus Problem     Runny nose x 6 days, dripping no-stop as he bends over, odor, cheeks/ sinus pain         HPI  Edgar Jackson is a very pleasant 61 y.o. male who presents to the clinic with left-sided maxillary sinus pain and pressure x6 days.  Patient states this has progressively worsened.  When he leans forward he describes pressure over the left side of his face.  Occasionally has purulent rhinorrhea.  Has a mild sore throat due to postnasal drainage.  States he has not been running a fever.  Denies any body aches or chills.  Occasional dry nonproductive cough.  Has been taking Tylenol which provides minimal improvement.  No history of seasonal allergies.  No known ill contacts.    REVIEW OF SYSTEMS  Review of Systems   Constitutional: Negative for chills, diaphoresis, fever and malaise/fatigue.   HENT: Positive for congestion and sinus pain. Negative for ear pain and sore throat.    Respiratory: Negative for cough, sputum production, shortness of breath and wheezing.    Cardiovascular: Negative for chest pain and palpitations.   Gastrointestinal: Negative for abdominal pain, diarrhea, nausea and vomiting.   Musculoskeletal: Negative for myalgias.   Neurological: Negative for dizziness and headaches.   Endo/Heme/Allergies: Negative for environmental allergies.       PAST MEDICAL HISTORY  Patient Active Problem List    Diagnosis Date Noted   • Degenerative lumbar spinal stenosis 12/07/2021   • Spinal stenosis of lumbar region with neurogenic claudication s/p laminectomies c/b Aflutter 12/05/2021   • Cervical stenosis of spinal canal 07/16/2018   • Osteoarthritis of spine with radiculopathy, cervical region 07/16/2018   • DDD (degenerative disc disease), lumbar 07/16/2018   • Atrial flutter (HCC) 05/30/2018   • Vertigo 05/30/2018   • Alcohol use 05/30/2018   • Dyslipidemia 09/23/2010   • Back pain 11/17/2009   • HTN (hypertension)  "08/20/2009       SURGICAL HISTORY   has a past surgical history that includes other (06/2018); cervical disk and fusion anterior (2013); other cardiac surgery (06/2018); cervical disk and fusion anterior (12/4/2018); hardware removal neuro (12/4/2018); other orthopedic surgery (Left, 03/2021); and laminotomy,lumbar disk,1 intrsp (N/A, 12/5/2021).    ALLERGIES  Allergies   Allergen Reactions   • Nkda [No Known Drug Allergy]        CURRENT MEDICATIONS  Home Medications     Reviewed by yTrone Burdick P.A.-C. (Physician Assistant) on 06/21/22 at 1323  Med List Status: <None>   Medication Last Dose Status   bethanechol (URECHOLINE) 10 MG Tab  Active   cephALEXin (KEFLEX) 500 MG Cap  Active   methocarbamol (ROBAXIN-750) 750 MG Tab  Active   multivitamin (THERAGRAN) Tab FEW DAYS MARGOT Active   tamsulosin (FLOMAX) 0.4 MG capsule  Active                SOCIAL HISTORY  Social History     Tobacco Use   • Smoking status: Never Smoker   • Smokeless tobacco: Never Used   Vaping Use   • Vaping Use: Never used   Substance and Sexual Activity   • Alcohol use: Not Currently     Comment: 2 drinks q day. 12/2/2021: 3 drinks a day.    • Drug use: No   • Sexual activity: Yes     Partners: Female       FAMILY HISTORY  Family History   Problem Relation Age of Onset   • Heart Disease Father           Objective:     VITAL SIGNS: /66 (BP Location: Left arm, Patient Position: Sitting, BP Cuff Size: Adult)   Pulse 84   Temp 36.9 °C (98.4 °F) (Temporal)   Resp 20   Ht 1.88 m (6' 2\")   Wt 95.3 kg (210 lb)   SpO2 98%   BMI 26.96 kg/m²     PHYSICAL EXAM  Physical Exam  Constitutional:       General: He is not in acute distress.     Appearance: Normal appearance. He is not ill-appearing, toxic-appearing or diaphoretic.   HENT:      Head: Normocephalic and atraumatic.      Comments: Tenderness to light palpation over the left maxillary sinus.     Right Ear: Tympanic membrane, ear canal and external ear normal.      Left Ear: Ear canal and " external ear normal.      Ears:      Comments: Left middle ear effusion     Nose: Congestion and rhinorrhea present.      Mouth/Throat:      Mouth: Mucous membranes are moist.      Pharynx: No oropharyngeal exudate or posterior oropharyngeal erythema.      Comments: Copious purulent postnasal drainage  Eyes:      Conjunctiva/sclera: Conjunctivae normal.   Cardiovascular:      Rate and Rhythm: Normal rate and regular rhythm.      Pulses: Normal pulses.      Heart sounds: Normal heart sounds.   Pulmonary:      Effort: Pulmonary effort is normal.      Breath sounds: Normal breath sounds. No wheezing.   Musculoskeletal:      Cervical back: Normal range of motion. No muscular tenderness.   Lymphadenopathy:      Cervical: No cervical adenopathy.   Skin:     General: Skin is warm and dry.      Capillary Refill: Capillary refill takes less than 2 seconds.   Neurological:      Mental Status: He is alert.   Psychiatric:         Mood and Affect: Mood normal.         Thought Content: Thought content normal.         Assessment/Plan:     1. Acute non-recurrent maxillary sinusitis  - amoxicillin-clavulanate (AUGMENTIN) 875-125 MG Tab; Take 1 Tablet by mouth 2 times a day for 7 days.  Dispense: 14 Tablet; Refill: 0  - fluticasone (FLONASE) 50 MCG/ACT nasal spray; Administer 2 Sprays into affected nostril(S) every day.  Dispense: 16 g; Refill: 0      MDM/Comments:    -Nasal spray and allergy medications as directed (Zyrtec or Loratadine).  -Antibiotic as directed with food.  -You may try saline irrigation or neti pot.   -Drink plenty of fluids.  -Ibuprofen or Tylenol as directed for pain.   -Warm compress to sinuses.      Follow up with primary care provider. Urgently for worsening symptoms, persistent fevers, facial swelling, visual changes, weakness, elevated heart rate, stiff neck, symptoms last longer than 10 days, or any other concerns.      Differential diagnosis, natural history, supportive care, and indications for immediate  follow-up discussed. All questions answered. Patient agrees with the plan of care.    Follow-up as needed if symptoms worsen or fail to improve to PCP, Urgent care or Emergency Room.    I have personally reviewed prior external notes and test results pertinent to today's visit.  I have independently reviewed and interpreted all diagnostics ordered during this urgent care acute visit.   Discussed management options (risks,benefits, and alternatives to treatment). Pt expresses understanding and the treatment plan was agreed upon. Questions were encouraged and answered to pt's satisfaction.    Please note that this dictation was created using voice recognition software. I have made a reasonable attempt to correct obvious errors, but I expect that there are errors of grammar and possibly content that I did not discover before finalizing the note.

## 2023-01-13 ENCOUNTER — TELEPHONE (OUTPATIENT)
Dept: SCHEDULING | Facility: IMAGING CENTER | Age: 63
End: 2023-01-13
Payer: COMMERCIAL

## 2023-01-17 ENCOUNTER — OFFICE VISIT (OUTPATIENT)
Dept: MEDICAL GROUP | Facility: MEDICAL CENTER | Age: 63
End: 2023-01-17
Payer: COMMERCIAL

## 2023-01-17 VITALS
SYSTOLIC BLOOD PRESSURE: 156 MMHG | DIASTOLIC BLOOD PRESSURE: 96 MMHG | BODY MASS INDEX: 27.85 KG/M2 | TEMPERATURE: 98 F | HEIGHT: 74 IN | HEART RATE: 63 BPM | WEIGHT: 217 LBS | OXYGEN SATURATION: 96 %

## 2023-01-17 DIAGNOSIS — G89.18 PAIN, POSTOPERATIVE, ACUTE: ICD-10-CM

## 2023-01-17 DIAGNOSIS — Z78.9 ALCOHOL USE: ICD-10-CM

## 2023-01-17 DIAGNOSIS — I10 PRIMARY HYPERTENSION: ICD-10-CM

## 2023-01-17 DIAGNOSIS — Z00.00 HEALTH CARE MAINTENANCE: ICD-10-CM

## 2023-01-17 DIAGNOSIS — M48.02 CERVICAL STENOSIS OF SPINAL CANAL: ICD-10-CM

## 2023-01-17 DIAGNOSIS — R06.83 SNORING: ICD-10-CM

## 2023-01-17 DIAGNOSIS — R35.1 NOCTURIA ASSOCIATED WITH BENIGN PROSTATIC HYPERPLASIA: ICD-10-CM

## 2023-01-17 DIAGNOSIS — N40.1 NOCTURIA ASSOCIATED WITH BENIGN PROSTATIC HYPERPLASIA: ICD-10-CM

## 2023-01-17 DIAGNOSIS — I48.92 ATRIAL FLUTTER, UNSPECIFIED TYPE (HCC): ICD-10-CM

## 2023-01-17 PROBLEM — R42 VERTIGO: Status: RESOLVED | Noted: 2018-05-30 | Resolved: 2023-01-17

## 2023-01-17 PROCEDURE — 99214 OFFICE O/P EST MOD 30 MIN: CPT | Performed by: STUDENT IN AN ORGANIZED HEALTH CARE EDUCATION/TRAINING PROGRAM

## 2023-01-17 RX ORDER — LOSARTAN POTASSIUM 25 MG/1
25 TABLET ORAL DAILY
Qty: 40 TABLET | Refills: 0 | Status: SHIPPED | OUTPATIENT
Start: 2023-01-17 | End: 2023-02-16

## 2023-01-17 RX ORDER — TAMSULOSIN HYDROCHLORIDE 0.4 MG/1
0.4 CAPSULE ORAL DAILY
Qty: 30 CAPSULE | Refills: 0 | Status: SHIPPED | OUTPATIENT
Start: 2023-01-17 | End: 2023-02-16

## 2023-01-17 SDOH — ECONOMIC STABILITY: TRANSPORTATION INSECURITY
IN THE PAST 12 MONTHS, HAS THE LACK OF TRANSPORTATION KEPT YOU FROM MEDICAL APPOINTMENTS OR FROM GETTING MEDICATIONS?: NO

## 2023-01-17 SDOH — HEALTH STABILITY: PHYSICAL HEALTH: ON AVERAGE, HOW MANY DAYS PER WEEK DO YOU ENGAGE IN MODERATE TO STRENUOUS EXERCISE (LIKE A BRISK WALK)?: 5 DAYS

## 2023-01-17 SDOH — ECONOMIC STABILITY: TRANSPORTATION INSECURITY
IN THE PAST 12 MONTHS, HAS LACK OF TRANSPORTATION KEPT YOU FROM MEETINGS, WORK, OR FROM GETTING THINGS NEEDED FOR DAILY LIVING?: NO

## 2023-01-17 SDOH — ECONOMIC STABILITY: HOUSING INSECURITY
IN THE LAST 12 MONTHS, WAS THERE A TIME WHEN YOU DID NOT HAVE A STEADY PLACE TO SLEEP OR SLEPT IN A SHELTER (INCLUDING NOW)?: NO

## 2023-01-17 SDOH — ECONOMIC STABILITY: INCOME INSECURITY: IN THE LAST 12 MONTHS, WAS THERE A TIME WHEN YOU WERE NOT ABLE TO PAY THE MORTGAGE OR RENT ON TIME?: NO

## 2023-01-17 SDOH — ECONOMIC STABILITY: TRANSPORTATION INSECURITY
IN THE PAST 12 MONTHS, HAS LACK OF RELIABLE TRANSPORTATION KEPT YOU FROM MEDICAL APPOINTMENTS, MEETINGS, WORK OR FROM GETTING THINGS NEEDED FOR DAILY LIVING?: NO

## 2023-01-17 SDOH — ECONOMIC STABILITY: FOOD INSECURITY: WITHIN THE PAST 12 MONTHS, THE FOOD YOU BOUGHT JUST DIDN'T LAST AND YOU DIDN'T HAVE MONEY TO GET MORE.: NEVER TRUE

## 2023-01-17 SDOH — ECONOMIC STABILITY: HOUSING INSECURITY: IN THE LAST 12 MONTHS, HOW MANY PLACES HAVE YOU LIVED?: 1

## 2023-01-17 SDOH — ECONOMIC STABILITY: FOOD INSECURITY: WITHIN THE PAST 12 MONTHS, YOU WORRIED THAT YOUR FOOD WOULD RUN OUT BEFORE YOU GOT MONEY TO BUY MORE.: NEVER TRUE

## 2023-01-17 SDOH — HEALTH STABILITY: PHYSICAL HEALTH: ON AVERAGE, HOW MANY MINUTES DO YOU ENGAGE IN EXERCISE AT THIS LEVEL?: 90 MIN

## 2023-01-17 SDOH — HEALTH STABILITY: MENTAL HEALTH
STRESS IS WHEN SOMEONE FEELS TENSE, NERVOUS, ANXIOUS, OR CAN'T SLEEP AT NIGHT BECAUSE THEIR MIND IS TROUBLED. HOW STRESSED ARE YOU?: ONLY A LITTLE

## 2023-01-17 SDOH — ECONOMIC STABILITY: INCOME INSECURITY: HOW HARD IS IT FOR YOU TO PAY FOR THE VERY BASICS LIKE FOOD, HOUSING, MEDICAL CARE, AND HEATING?: NOT HARD AT ALL

## 2023-01-17 ASSESSMENT — ENCOUNTER SYMPTOMS
SHORTNESS OF BREATH: 0
CHILLS: 0
FEVER: 0

## 2023-01-17 ASSESSMENT — SOCIAL DETERMINANTS OF HEALTH (SDOH)
HOW OFTEN DO YOU HAVE SIX OR MORE DRINKS ON ONE OCCASION: WEEKLY
HOW HARD IS IT FOR YOU TO PAY FOR THE VERY BASICS LIKE FOOD, HOUSING, MEDICAL CARE, AND HEATING?: NOT HARD AT ALL
DO YOU BELONG TO ANY CLUBS OR ORGANIZATIONS SUCH AS CHURCH GROUPS UNIONS, FRATERNAL OR ATHLETIC GROUPS, OR SCHOOL GROUPS?: YES
IN A TYPICAL WEEK, HOW MANY TIMES DO YOU TALK ON THE PHONE WITH FAMILY, FRIENDS, OR NEIGHBORS?: ONCE A WEEK
HOW OFTEN DO YOU GET TOGETHER WITH FRIENDS OR RELATIVES?: ONCE A WEEK
HOW OFTEN DO YOU ATTENT MEETINGS OF THE CLUB OR ORGANIZATION YOU BELONG TO?: MORE THAN 4 TIMES PER YEAR
HOW OFTEN DO YOU GET TOGETHER WITH FRIENDS OR RELATIVES?: ONCE A WEEK
IN A TYPICAL WEEK, HOW MANY TIMES DO YOU TALK ON THE PHONE WITH FAMILY, FRIENDS, OR NEIGHBORS?: ONCE A WEEK
HOW OFTEN DO YOU HAVE A DRINK CONTAINING ALCOHOL: 4 OR MORE TIMES A WEEK
HOW OFTEN DO YOU ATTEND CHURCH OR RELIGIOUS SERVICES?: NEVER
WITHIN THE PAST 12 MONTHS, YOU WORRIED THAT YOUR FOOD WOULD RUN OUT BEFORE YOU GOT THE MONEY TO BUY MORE: NEVER TRUE
HOW MANY DRINKS CONTAINING ALCOHOL DO YOU HAVE ON A TYPICAL DAY WHEN YOU ARE DRINKING: 3 OR 4
HOW OFTEN DO YOU ATTEND CHURCH OR RELIGIOUS SERVICES?: NEVER
HOW OFTEN DO YOU ATTENT MEETINGS OF THE CLUB OR ORGANIZATION YOU BELONG TO?: MORE THAN 4 TIMES PER YEAR
DO YOU BELONG TO ANY CLUBS OR ORGANIZATIONS SUCH AS CHURCH GROUPS UNIONS, FRATERNAL OR ATHLETIC GROUPS, OR SCHOOL GROUPS?: YES

## 2023-01-17 ASSESSMENT — LIFESTYLE VARIABLES
HOW MANY STANDARD DRINKS CONTAINING ALCOHOL DO YOU HAVE ON A TYPICAL DAY: 3 OR 4
AUDIT-C TOTAL SCORE: 8
HOW OFTEN DO YOU HAVE A DRINK CONTAINING ALCOHOL: 4 OR MORE TIMES A WEEK
HOW OFTEN DO YOU HAVE SIX OR MORE DRINKS ON ONE OCCASION: WEEKLY
SKIP TO QUESTIONS 9-10: 0

## 2023-01-17 ASSESSMENT — FIBROSIS 4 INDEX: FIB4 SCORE: 1.76

## 2023-01-17 NOTE — PROGRESS NOTES
"Subjective:     CC:  Diagnoses of Primary hypertension, Atrial flutter, unspecified type (HCC), Snoring, Alcohol use, Health care maintenance, Cervical stenosis of spinal canal, Nocturia associated with benign prostatic hyperplasia, and Pain, postoperative, acute were pertinent to this visit.    HISTORY OF THE PRESENT ILLNESS: Patient is a 62 y.o. male with the following medical problems   Past Medical History:   Diagnosis Date    Arrhythmia 2018    a flutter    Back pain     Neck pain      . This pleasant patient is here today to establish care and discuss the following;    Problem   Nocturia Associated With Benign Prostatic Hyperplasia    Patient reports having to get up roughly 4 times/night. He also reports decrease stream.      Cervical Stenosis of Spinal Canal    S/p cervical disk fusion     Atrial Flutter (Hcc)    Patient had an ablation roughly 5 years ago. He has not had repeat episodes of a flutter since.      Alcohol Use    Patient is still drinking 4-5 drinks/day     Htn (Hypertension)    Patient reports his BP his consisently in the high 130's/91     Vertigo (Resolved)   Back Pain (Resolved)       Current Outpatient Medications Ordered in Epic   Medication Sig Dispense Refill    tamsulosin (FLOMAX) 0.4 MG capsule Take 1 Capsule by mouth every day. 30 Capsule 0    losartan (COZAAR) 25 MG Tab Take 1 Tablet by mouth every day. 40 Tablet 0     No current Epic-ordered facility-administered medications on file.         ROS:   Review of Systems   Constitutional:  Negative for chills and fever.   Respiratory:  Negative for shortness of breath.    Cardiovascular:  Negative for chest pain.       Objective:     Exam: BP (!) 156/96 (BP Location: Left arm, Patient Position: Sitting, BP Cuff Size: Adult)   Pulse 63   Temp 36.7 °C (98 °F) (Temporal)   Ht 1.88 m (6' 2\")   Wt 98.4 kg (217 lb)   SpO2 96%  Body mass index is 27.86 kg/m².    Physical Exam  Constitutional:       General: He is not in acute distress.    "  Appearance: He is not ill-appearing.   Cardiovascular:      Rate and Rhythm: Normal rate and regular rhythm.      Heart sounds:     No friction rub. No gallop.   Pulmonary:      Effort: Pulmonary effort is normal.   Neurological:      Mental Status: He is alert.   Psychiatric:         Mood and Affect: Mood normal.         Behavior: Behavior normal.         Thought Content: Thought content normal.         Judgment: Judgment normal.             Assessment & Plan:     Problem List Items Addressed This Visit       Alcohol use     Chronic  -with patient's high blood pressure, I recommended patient to cut down to at least 1-2/day         Atrial flutter (HCC)    Relevant Medications    losartan (COZAAR) 25 MG Tab    Cervical stenosis of spinal canal    HTN (hypertension)     Chronic-not controlled  -will start losartan 25mg, side effects discussed with patient  -he will continue to check log his BP at home         Relevant Medications    losartan (COZAAR) 25 MG Tab    Nocturia associated with benign prostatic hyperplasia     Chronic-not controlled  -start flomax   -if not effective will consider other options or referral to urology          Other Visit Diagnoses       Snoring        Relevant Orders    Referral to Pulmonary and Sleep Medicine    Health care maintenance        Relevant Orders    HEMOGLOBIN A1C    CBC WITH DIFFERENTIAL    Comp Metabolic Panel    TSH WITH REFLEX TO FT4    Lipid Profile    PROSTATE SPECIFIC AG SCREENING    Pain, postoperative, acute        Relevant Medications    tamsulosin (FLOMAX) 0.4 MG capsule              Return in about 1 month (around 2/17/2023) for Labs .    Please note that this dictation was created using voice recognition software. I have made every reasonable attempt to correct obvious errors, but I expect that there are errors of grammar and possibly content that I did not discover before finalizing the note.

## 2023-01-17 NOTE — ASSESSMENT & PLAN NOTE
Chronic  -with patient's high blood pressure, I recommended patient to cut down to at least 1-2/day

## 2023-01-17 NOTE — ASSESSMENT & PLAN NOTE
Chronic-not controlled  -start flomax   -if not effective will consider other options or referral to urology

## 2023-01-17 NOTE — ASSESSMENT & PLAN NOTE
Chronic-not controlled  -will start losartan 25mg, side effects discussed with patient  -he will continue to check log his BP at home

## 2023-01-25 ENCOUNTER — TELEPHONE (OUTPATIENT)
Dept: HEALTH INFORMATION MANAGEMENT | Facility: OTHER | Age: 63
End: 2023-01-25
Payer: COMMERCIAL

## 2023-01-31 ENCOUNTER — HOSPITAL ENCOUNTER (OUTPATIENT)
Dept: LAB | Facility: MEDICAL CENTER | Age: 63
End: 2023-01-31
Attending: STUDENT IN AN ORGANIZED HEALTH CARE EDUCATION/TRAINING PROGRAM
Payer: COMMERCIAL

## 2023-01-31 DIAGNOSIS — Z00.00 HEALTH CARE MAINTENANCE: ICD-10-CM

## 2023-01-31 LAB
ALBUMIN SERPL BCP-MCNC: 4.1 G/DL (ref 3.2–4.9)
ALBUMIN/GLOB SERPL: 1.6 G/DL
ALP SERPL-CCNC: 58 U/L (ref 30–99)
ALT SERPL-CCNC: 12 U/L (ref 2–50)
ANION GAP SERPL CALC-SCNC: 9 MMOL/L (ref 7–16)
AST SERPL-CCNC: 15 U/L (ref 12–45)
BASOPHILS # BLD AUTO: 0.3 % (ref 0–1.8)
BASOPHILS # BLD: 0.02 K/UL (ref 0–0.12)
BILIRUB SERPL-MCNC: 0.4 MG/DL (ref 0.1–1.5)
BUN SERPL-MCNC: 13 MG/DL (ref 8–22)
CALCIUM ALBUM COR SERPL-MCNC: 9 MG/DL (ref 8.5–10.5)
CALCIUM SERPL-MCNC: 9.1 MG/DL (ref 8.4–10.2)
CHLORIDE SERPL-SCNC: 105 MMOL/L (ref 96–112)
CHOLEST SERPL-MCNC: 216 MG/DL (ref 100–199)
CO2 SERPL-SCNC: 26 MMOL/L (ref 20–33)
CREAT SERPL-MCNC: 0.82 MG/DL (ref 0.5–1.4)
EOSINOPHIL # BLD AUTO: 0.19 K/UL (ref 0–0.51)
EOSINOPHIL NFR BLD: 3.2 % (ref 0–6.9)
ERYTHROCYTE [DISTWIDTH] IN BLOOD BY AUTOMATED COUNT: 43.9 FL (ref 35.9–50)
EST. AVERAGE GLUCOSE BLD GHB EST-MCNC: 97 MG/DL
FASTING STATUS PATIENT QL REPORTED: NORMAL
GFR SERPLBLD CREATININE-BSD FMLA CKD-EPI: 100 ML/MIN/1.73 M 2
GLOBULIN SER CALC-MCNC: 2.6 G/DL (ref 1.9–3.5)
GLUCOSE SERPL-MCNC: 93 MG/DL (ref 65–99)
HBA1C MFR BLD: 5 % (ref 4–5.6)
HCT VFR BLD AUTO: 41.7 % (ref 42–52)
HDLC SERPL-MCNC: 74 MG/DL
HGB BLD-MCNC: 14 G/DL (ref 14–18)
IMM GRANULOCYTES # BLD AUTO: 0.01 K/UL (ref 0–0.11)
IMM GRANULOCYTES NFR BLD AUTO: 0.2 % (ref 0–0.9)
LDLC SERPL CALC-MCNC: 128 MG/DL
LYMPHOCYTES # BLD AUTO: 1.77 K/UL (ref 1–4.8)
LYMPHOCYTES NFR BLD: 29.6 % (ref 22–41)
MCH RBC QN AUTO: 31.6 PG (ref 27–33)
MCHC RBC AUTO-ENTMCNC: 33.6 G/DL (ref 33.7–35.3)
MCV RBC AUTO: 94.1 FL (ref 81.4–97.8)
MONOCYTES # BLD AUTO: 0.54 K/UL (ref 0–0.85)
MONOCYTES NFR BLD AUTO: 9 % (ref 0–13.4)
NEUTROPHILS # BLD AUTO: 3.44 K/UL (ref 1.82–7.42)
NEUTROPHILS NFR BLD: 57.7 % (ref 44–72)
NRBC # BLD AUTO: 0 K/UL
NRBC BLD-RTO: 0 /100 WBC
PLATELET # BLD AUTO: 181 K/UL (ref 164–446)
PMV BLD AUTO: 9.6 FL (ref 9–12.9)
POTASSIUM SERPL-SCNC: 3.8 MMOL/L (ref 3.6–5.5)
PROT SERPL-MCNC: 6.7 G/DL (ref 6–8.2)
PSA SERPL-MCNC: 1.06 NG/ML (ref 0–4)
RBC # BLD AUTO: 4.43 M/UL (ref 4.7–6.1)
SODIUM SERPL-SCNC: 140 MMOL/L (ref 135–145)
TRIGL SERPL-MCNC: 69 MG/DL (ref 0–149)
TSH SERPL DL<=0.005 MIU/L-ACNC: 2.55 UIU/ML (ref 0.38–5.33)
WBC # BLD AUTO: 6 K/UL (ref 4.8–10.8)

## 2023-01-31 PROCEDURE — 80053 COMPREHEN METABOLIC PANEL: CPT

## 2023-01-31 PROCEDURE — 85025 COMPLETE CBC W/AUTO DIFF WBC: CPT

## 2023-01-31 PROCEDURE — 84153 ASSAY OF PSA TOTAL: CPT

## 2023-01-31 PROCEDURE — 36415 COLL VENOUS BLD VENIPUNCTURE: CPT

## 2023-01-31 PROCEDURE — 83036 HEMOGLOBIN GLYCOSYLATED A1C: CPT

## 2023-01-31 PROCEDURE — 80061 LIPID PANEL: CPT

## 2023-01-31 PROCEDURE — 84443 ASSAY THYROID STIM HORMONE: CPT

## 2023-02-16 ENCOUNTER — OFFICE VISIT (OUTPATIENT)
Dept: MEDICAL GROUP | Facility: MEDICAL CENTER | Age: 63
End: 2023-02-16
Payer: COMMERCIAL

## 2023-02-16 VITALS
HEART RATE: 50 BPM | OXYGEN SATURATION: 95 % | HEIGHT: 74 IN | BODY MASS INDEX: 27.59 KG/M2 | TEMPERATURE: 97 F | SYSTOLIC BLOOD PRESSURE: 138 MMHG | DIASTOLIC BLOOD PRESSURE: 82 MMHG | WEIGHT: 215 LBS

## 2023-02-16 DIAGNOSIS — Z91.89 OTHER SPECIFIED PERSONAL RISK FACTORS, NOT ELSEWHERE CLASSIFIED: ICD-10-CM

## 2023-02-16 DIAGNOSIS — E78.5 DYSLIPIDEMIA: ICD-10-CM

## 2023-02-16 DIAGNOSIS — I10 PRIMARY HYPERTENSION: ICD-10-CM

## 2023-02-16 PROCEDURE — 99214 OFFICE O/P EST MOD 30 MIN: CPT | Performed by: STUDENT IN AN ORGANIZED HEALTH CARE EDUCATION/TRAINING PROGRAM

## 2023-02-16 RX ORDER — LOSARTAN POTASSIUM 50 MG/1
50 TABLET ORAL DAILY
Qty: 90 TABLET | Refills: 1 | Status: SHIPPED | OUTPATIENT
Start: 2023-02-16 | End: 2023-08-11

## 2023-02-16 ASSESSMENT — ENCOUNTER SYMPTOMS
FEVER: 0
SHORTNESS OF BREATH: 0
CHILLS: 0

## 2023-02-16 ASSESSMENT — FIBROSIS 4 INDEX: FIB4 SCORE: 1.46

## 2023-02-25 ENCOUNTER — HOSPITAL ENCOUNTER (OUTPATIENT)
Dept: RADIOLOGY | Facility: MEDICAL CENTER | Age: 63
End: 2023-02-25
Attending: STUDENT IN AN ORGANIZED HEALTH CARE EDUCATION/TRAINING PROGRAM
Payer: COMMERCIAL

## 2023-02-25 DIAGNOSIS — Z91.89 OTHER SPECIFIED PERSONAL RISK FACTORS, NOT ELSEWHERE CLASSIFIED: ICD-10-CM

## 2023-02-25 PROCEDURE — 4410556 CT-CARDIAC SCORING (SELF PAY ONLY)

## 2023-02-28 ENCOUNTER — TELEPHONE (OUTPATIENT)
Dept: MEDICAL GROUP | Facility: MEDICAL CENTER | Age: 63
End: 2023-02-28
Payer: COMMERCIAL

## 2023-02-28 NOTE — TELEPHONE ENCOUNTER
----- Message from Silas Nugent D.O. sent at 2/27/2023  7:55 AM PST -----  Can we please schedule him to discuss the results and possibly starting a medication. Virtual is okay.

## 2023-03-01 ENCOUNTER — OFFICE VISIT (OUTPATIENT)
Dept: MEDICAL GROUP | Facility: MEDICAL CENTER | Age: 63
End: 2023-03-01
Payer: COMMERCIAL

## 2023-03-01 VITALS
SYSTOLIC BLOOD PRESSURE: 132 MMHG | HEART RATE: 54 BPM | WEIGHT: 219 LBS | TEMPERATURE: 97 F | OXYGEN SATURATION: 98 % | BODY MASS INDEX: 28.11 KG/M2 | HEIGHT: 74 IN | DIASTOLIC BLOOD PRESSURE: 74 MMHG

## 2023-03-01 DIAGNOSIS — I10 PRIMARY HYPERTENSION: ICD-10-CM

## 2023-03-01 DIAGNOSIS — E78.5 DYSLIPIDEMIA: ICD-10-CM

## 2023-03-01 PROCEDURE — 99214 OFFICE O/P EST MOD 30 MIN: CPT | Performed by: STUDENT IN AN ORGANIZED HEALTH CARE EDUCATION/TRAINING PROGRAM

## 2023-03-01 RX ORDER — ROSUVASTATIN CALCIUM 5 MG/1
5 TABLET, COATED ORAL EVERY EVENING
Qty: 30 TABLET | Refills: 11 | Status: SHIPPED | OUTPATIENT
Start: 2023-03-01 | End: 2023-03-29

## 2023-03-01 ASSESSMENT — ENCOUNTER SYMPTOMS
SHORTNESS OF BREATH: 0
FEVER: 0
CHILLS: 0

## 2023-03-01 ASSESSMENT — FIBROSIS 4 INDEX: FIB4 SCORE: 1.46

## 2023-03-01 NOTE — ASSESSMENT & PLAN NOTE
Chronic  -start rosuvastatin 5mg, medication discussed with patient including side effects  -patient agrees to move forward  -check lipid panel in 6 months

## 2023-03-01 NOTE — PROGRESS NOTES
"Subjective:     CC: Results follow up    HPI:   Edgar presents today for the following;    Problem   Dyslipidemia      Lab Results   Component Value Date/Time    CHOLSTRLTOT 216 (H) 01/31/2023 11:57 AM     (H) 01/31/2023 11:57 AM    HDL 74 01/31/2023 11:57 AM    TRIGLYCERIDE 69 01/31/2023 11:57 AM    -high CT cardiac score reported        Htn (Hypertension)    Patient reports his BP his consisently in the high 130's/91. Patient reports his still elevated blood pressure despite the 25mg of losartan         Current Outpatient Medications Ordered in Epic   Medication Sig Dispense Refill    rosuvastatin (CRESTOR) 5 MG Tab Take 1 Tablet by mouth every evening. 30 Tablet 11    tamsulosin (FLOMAX) 0.4 MG capsule TAKE 1 CAPSULE BY MOUTH EVERY DAY 90 Capsule 3    losartan (COZAAR) 50 MG Tab Take 1 Tablet by mouth every day. 90 Tablet 1     No current Epic-ordered facility-administered medications on file.           ROS:  Review of Systems   Constitutional:  Negative for chills and fever.   Respiratory:  Negative for shortness of breath.    Cardiovascular:  Negative for chest pain.     Objective:     Exam:  /74 (BP Location: Left arm, Patient Position: Sitting, BP Cuff Size: Adult)   Pulse (!) 54   Temp 36.1 °C (97 °F) (Temporal)   Ht 1.88 m (6' 2\")   Wt 99.3 kg (219 lb)   SpO2 98%   BMI 28.12 kg/m²  Body mass index is 28.12 kg/m².    Physical Exam  Constitutional:       General: He is not in acute distress.     Appearance: He is not ill-appearing.   Pulmonary:      Effort: Pulmonary effort is normal.   Neurological:      Mental Status: He is alert.   Psychiatric:         Mood and Affect: Mood normal.         Behavior: Behavior normal.         Thought Content: Thought content normal.         Judgment: Judgment normal.             Assessment & Plan:     Problem List Items Addressed This Visit       Dyslipidemia     Chronic  -start rosuvastatin 5mg, medication discussed with patient including side " effects  -patient agrees to move forward  -check lipid panel in 6 months          Relevant Medications    rosuvastatin (CRESTOR) 5 MG Tab    Other Relevant Orders    Lipid Profile    Comp Metabolic Panel    HTN (hypertension)     Chronic  -continue losartan 50mg         Relevant Medications    rosuvastatin (CRESTOR) 5 MG Tab             Return in about 6 months (around 9/1/2023) for Annual.    Please note that this dictation was created using voice recognition software. I have made every reasonable attempt to correct obvious errors, but I expect that there are errors of grammar and possibly content that I did not discover before finalizing the note.

## 2023-07-21 ENCOUNTER — APPOINTMENT (OUTPATIENT)
Dept: SLEEP MEDICINE | Facility: MEDICAL CENTER | Age: 63
End: 2023-07-21
Attending: STUDENT IN AN ORGANIZED HEALTH CARE EDUCATION/TRAINING PROGRAM
Payer: COMMERCIAL

## 2023-09-05 ENCOUNTER — OFFICE VISIT (OUTPATIENT)
Dept: MEDICAL GROUP | Facility: LAB | Age: 63
End: 2023-09-05
Payer: COMMERCIAL

## 2023-09-05 VITALS
RESPIRATION RATE: 14 BRPM | HEART RATE: 60 BPM | HEIGHT: 74 IN | TEMPERATURE: 97.8 F | OXYGEN SATURATION: 96 % | SYSTOLIC BLOOD PRESSURE: 132 MMHG | DIASTOLIC BLOOD PRESSURE: 78 MMHG | BODY MASS INDEX: 27.13 KG/M2 | WEIGHT: 211.4 LBS

## 2023-09-05 DIAGNOSIS — N40.1 NOCTURIA ASSOCIATED WITH BENIGN PROSTATIC HYPERPLASIA: ICD-10-CM

## 2023-09-05 DIAGNOSIS — E78.5 DYSLIPIDEMIA: ICD-10-CM

## 2023-09-05 DIAGNOSIS — Z00.00 HEALTHCARE MAINTENANCE: ICD-10-CM

## 2023-09-05 DIAGNOSIS — Z23 NEED FOR VACCINATION: ICD-10-CM

## 2023-09-05 DIAGNOSIS — R35.1 NOCTURIA ASSOCIATED WITH BENIGN PROSTATIC HYPERPLASIA: ICD-10-CM

## 2023-09-05 PROCEDURE — 3078F DIAST BP <80 MM HG: CPT | Performed by: STUDENT IN AN ORGANIZED HEALTH CARE EDUCATION/TRAINING PROGRAM

## 2023-09-05 PROCEDURE — 99396 PREV VISIT EST AGE 40-64: CPT | Mod: 25 | Performed by: STUDENT IN AN ORGANIZED HEALTH CARE EDUCATION/TRAINING PROGRAM

## 2023-09-05 PROCEDURE — 90715 TDAP VACCINE 7 YRS/> IM: CPT | Performed by: STUDENT IN AN ORGANIZED HEALTH CARE EDUCATION/TRAINING PROGRAM

## 2023-09-05 PROCEDURE — 90471 IMMUNIZATION ADMIN: CPT | Performed by: STUDENT IN AN ORGANIZED HEALTH CARE EDUCATION/TRAINING PROGRAM

## 2023-09-05 PROCEDURE — 3075F SYST BP GE 130 - 139MM HG: CPT | Performed by: STUDENT IN AN ORGANIZED HEALTH CARE EDUCATION/TRAINING PROGRAM

## 2023-09-05 ASSESSMENT — PATIENT HEALTH QUESTIONNAIRE - PHQ9: CLINICAL INTERPRETATION OF PHQ2 SCORE: 0

## 2023-09-05 ASSESSMENT — FIBROSIS 4 INDEX: FIB4 SCORE: 1.48

## 2023-09-05 ASSESSMENT — ENCOUNTER SYMPTOMS
COUGH: 0
BLOOD IN STOOL: 0
PALPITATIONS: 0
CHILLS: 0
SHORTNESS OF BREATH: 0
VOMITING: 0
ABDOMINAL PAIN: 0
SPUTUM PRODUCTION: 0
FEVER: 0

## 2023-09-05 NOTE — PROGRESS NOTES
Subjective:     Subjective:     CC:   Chief Complaint   Patient presents with    Annual Wellness Visit       HPI:   Edgar Jackson is a 63 y.o. male who presents for annual exam    Last Colorectal Cancer Screenin  Last Tdap: today  Received HPV series: Aged out  Hx STDs: No    Exercise: active daily  Diet: eat mostly at home, eats vegetables fruits    He  has a past medical history of Arrhythmia (), Back pain, and Neck pain.  He  has a past surgical history that includes other (2018); cervical disk and fusion anterior (); other cardiac surgery (2018); cervical disk and fusion anterior (2018); hardware removal neuro (2018); other orthopedic surgery (Left, 2021); and pr laminotomy,lumbar disk,1 intrsp (N/A, 2021).    Family History   Problem Relation Age of Onset    Hypertension Father     Heart Disease Father      Social History     Tobacco Use    Smoking status: Never    Smokeless tobacco: Never   Vaping Use    Vaping Use: Never used   Substance Use Topics    Alcohol use: Yes     Comment: 4-5/day    Drug use: No     He  reports being sexually active and has had partner(s) who are female.    Patient Active Problem List    Diagnosis Date Noted    Nocturia associated with benign prostatic hyperplasia 2023    Degenerative lumbar spinal stenosis 2021    Spinal stenosis of lumbar region with neurogenic claudication s/p laminectomies c/b Aflutter 2021    Cervical stenosis of spinal canal 2018    Osteoarthritis of spine with radiculopathy, cervical region 2018    DDD (degenerative disc disease), lumbar 2018    Atrial flutter (HCC) 2018    Alcohol use 2018    Dyslipidemia 2010    HTN (hypertension) 2009     Current Outpatient Medications   Medication Sig Dispense Refill    tamsulosin (FLOMAX) 0.4 MG capsule TAKE 1 CAPSULE BY MOUTH EVERY DAY 90 Capsule 3    losartan (COZAAR) 50 MG Tab TAKE 1 TABLET BY MOUTH EVERY DAY 90  "Tablet 3    rosuvastatin (CRESTOR) 5 MG Tab Take 1 Tablet by mouth every evening. 90 Tablet 3     No current facility-administered medications for this visit.     Allergies   Allergen Reactions    Nkda [No Known Drug Allergy]        Review of Systems   Review of Systems   Constitutional:  Negative for chills and fever.   Respiratory:  Negative for cough, sputum production and shortness of breath.    Cardiovascular:  Negative for chest pain and palpitations.   Gastrointestinal:  Negative for abdominal pain, blood in stool and vomiting.   Musculoskeletal:  Positive for joint pain.        Objective:   /78 (BP Location: Right arm, Patient Position: Sitting, BP Cuff Size: Adult long)   Pulse 60   Temp 36.6 °C (97.8 °F)   Resp 14   Ht 1.88 m (6' 2\")   Wt 95.9 kg (211 lb 6.4 oz)   SpO2 96%   BMI 27.14 kg/m²      Wt Readings from Last 4 Encounters:   09/05/23 95.9 kg (211 lb 6.4 oz)   03/01/23 99.3 kg (219 lb)   02/16/23 97.5 kg (215 lb)   01/17/23 98.4 kg (217 lb)           Physical Exam:  Physical Exam  Constitutional:       General: He is not in acute distress.     Appearance: Normal appearance. He is not ill-appearing.   HENT:      Head: Normocephalic and atraumatic.      Right Ear: Tympanic membrane and ear canal normal.      Left Ear: Tympanic membrane and ear canal normal.      Mouth/Throat:      Mouth: Mucous membranes are moist.      Pharynx: Oropharynx is clear.   Eyes:      Extraocular Movements: Extraocular movements intact.      Pupils: Pupils are equal, round, and reactive to light.   Neck:      Thyroid: No thyromegaly.   Cardiovascular:      Rate and Rhythm: Normal rate and regular rhythm.      Heart sounds: Normal heart sounds.   Pulmonary:      Effort: Pulmonary effort is normal.      Breath sounds: Normal breath sounds.   Abdominal:      General: Abdomen is flat. Bowel sounds are normal.      Palpations: Abdomen is soft.   Musculoskeletal:      Cervical back: Normal range of motion and neck " supple.      Right lower leg: No edema.      Left lower leg: No edema.   Lymphadenopathy:      Cervical: No cervical adenopathy.   Skin:     General: Skin is warm and dry.   Neurological:      General: No focal deficit present.      Mental Status: He is alert.   Psychiatric:         Mood and Affect: Mood normal.         Behavior: Behavior normal.         Thought Content: Thought content normal.         Judgment: Judgment normal.             Assessment and Plan:     Problem List Items Addressed This Visit       Dyslipidemia     Chronic  -continue rosuvastatin 5mg for now         Nocturia associated with benign prostatic hyperplasia     Chronic  -continue tamsulosin 0.4mg           Other Visit Diagnoses       Need for vaccination        Relevant Orders    Tdap =>6yo IM    Healthcare maintenance        Relevant Orders    HEMOGLOBIN A1C    CBC WITH DIFFERENTIAL    Comp Metabolic Panel    TSH WITH REFLEX TO FT4    Lipid Profile    VITAMIN D,25 HYDROXY (DEFICIENCY)             Health maintenance:    Labs per orders  Immunizations per orders  Patient counseled about skin care, diet, supplements, and exercise.  Discussed diet and exercise     Follow-up: 1 year annual

## 2023-09-25 ENCOUNTER — HOSPITAL ENCOUNTER (OUTPATIENT)
Dept: LAB | Facility: MEDICAL CENTER | Age: 63
End: 2023-09-25
Attending: STUDENT IN AN ORGANIZED HEALTH CARE EDUCATION/TRAINING PROGRAM
Payer: COMMERCIAL

## 2023-09-25 DIAGNOSIS — E78.5 DYSLIPIDEMIA: ICD-10-CM

## 2023-09-25 LAB
ALBUMIN SERPL BCP-MCNC: 4.1 G/DL (ref 3.2–4.9)
ALBUMIN/GLOB SERPL: 1.5 G/DL
ALP SERPL-CCNC: 67 U/L (ref 30–99)
ALT SERPL-CCNC: 29 U/L (ref 2–50)
ANION GAP SERPL CALC-SCNC: 11 MMOL/L (ref 7–16)
AST SERPL-CCNC: 26 U/L (ref 12–45)
BILIRUB SERPL-MCNC: 0.7 MG/DL (ref 0.1–1.5)
BUN SERPL-MCNC: 13 MG/DL (ref 8–22)
CALCIUM ALBUM COR SERPL-MCNC: 9 MG/DL (ref 8.5–10.5)
CALCIUM SERPL-MCNC: 9.1 MG/DL (ref 8.4–10.2)
CHLORIDE SERPL-SCNC: 104 MMOL/L (ref 96–112)
CHOLEST SERPL-MCNC: 164 MG/DL (ref 100–199)
CO2 SERPL-SCNC: 27 MMOL/L (ref 20–33)
CREAT SERPL-MCNC: 0.8 MG/DL (ref 0.5–1.4)
FASTING STATUS PATIENT QL REPORTED: NORMAL
GFR SERPLBLD CREATININE-BSD FMLA CKD-EPI: 100 ML/MIN/1.73 M 2
GLOBULIN SER CALC-MCNC: 2.8 G/DL (ref 1.9–3.5)
GLUCOSE SERPL-MCNC: 88 MG/DL (ref 65–99)
HDLC SERPL-MCNC: 83 MG/DL
LDLC SERPL CALC-MCNC: 75 MG/DL
POTASSIUM SERPL-SCNC: 4.3 MMOL/L (ref 3.6–5.5)
PROT SERPL-MCNC: 6.9 G/DL (ref 6–8.2)
SODIUM SERPL-SCNC: 142 MMOL/L (ref 135–145)
TRIGL SERPL-MCNC: 29 MG/DL (ref 0–149)

## 2023-09-25 PROCEDURE — 36415 COLL VENOUS BLD VENIPUNCTURE: CPT

## 2023-09-25 PROCEDURE — 80061 LIPID PANEL: CPT

## 2023-09-25 PROCEDURE — 80053 COMPREHEN METABOLIC PANEL: CPT

## 2024-02-08 DIAGNOSIS — G89.18 PAIN, POSTOPERATIVE, ACUTE: ICD-10-CM

## 2024-02-08 RX ORDER — TAMSULOSIN HYDROCHLORIDE 0.4 MG/1
0.4 CAPSULE ORAL DAILY
Qty: 90 CAPSULE | Refills: 3 | Status: SHIPPED | OUTPATIENT
Start: 2024-02-08

## 2024-02-27 ENCOUNTER — APPOINTMENT (OUTPATIENT)
Dept: LAB | Facility: MEDICAL CENTER | Age: 64
End: 2024-02-27
Attending: STUDENT IN AN ORGANIZED HEALTH CARE EDUCATION/TRAINING PROGRAM
Payer: COMMERCIAL

## 2024-02-27 DIAGNOSIS — Z00.00 HEALTHCARE MAINTENANCE: ICD-10-CM

## 2024-02-27 LAB
25(OH)D3 SERPL-MCNC: 25 NG/ML (ref 30–100)
ALBUMIN SERPL BCP-MCNC: 4.4 G/DL (ref 3.2–4.9)
ALBUMIN/GLOB SERPL: 1.6 G/DL
ALP SERPL-CCNC: 64 U/L (ref 30–99)
ALT SERPL-CCNC: 18 U/L (ref 2–50)
ANION GAP SERPL CALC-SCNC: 11 MMOL/L (ref 7–16)
AST SERPL-CCNC: 19 U/L (ref 12–45)
BASOPHILS # BLD AUTO: 0.5 % (ref 0–1.8)
BASOPHILS # BLD: 0.03 K/UL (ref 0–0.12)
BILIRUB SERPL-MCNC: 0.8 MG/DL (ref 0.1–1.5)
BUN SERPL-MCNC: 13 MG/DL (ref 8–22)
CALCIUM ALBUM COR SERPL-MCNC: 9 MG/DL (ref 8.5–10.5)
CALCIUM SERPL-MCNC: 9.3 MG/DL (ref 8.4–10.2)
CHLORIDE SERPL-SCNC: 104 MMOL/L (ref 96–112)
CHOLEST SERPL-MCNC: 173 MG/DL (ref 100–199)
CO2 SERPL-SCNC: 25 MMOL/L (ref 20–33)
CREAT SERPL-MCNC: 0.86 MG/DL (ref 0.5–1.4)
EOSINOPHIL # BLD AUTO: 0.13 K/UL (ref 0–0.51)
EOSINOPHIL NFR BLD: 2.3 % (ref 0–6.9)
ERYTHROCYTE [DISTWIDTH] IN BLOOD BY AUTOMATED COUNT: 45.4 FL (ref 35.9–50)
EST. AVERAGE GLUCOSE BLD GHB EST-MCNC: 103 MG/DL
FASTING STATUS PATIENT QL REPORTED: NORMAL
GFR SERPLBLD CREATININE-BSD FMLA CKD-EPI: 98 ML/MIN/1.73 M 2
GLOBULIN SER CALC-MCNC: 2.7 G/DL (ref 1.9–3.5)
GLUCOSE SERPL-MCNC: 88 MG/DL (ref 65–99)
HBA1C MFR BLD: 5.2 % (ref 4–5.6)
HCT VFR BLD AUTO: 43.4 % (ref 42–52)
HDLC SERPL-MCNC: 95 MG/DL
HGB BLD-MCNC: 14.6 G/DL (ref 14–18)
IMM GRANULOCYTES # BLD AUTO: 0.01 K/UL (ref 0–0.11)
IMM GRANULOCYTES NFR BLD AUTO: 0.2 % (ref 0–0.9)
LDLC SERPL CALC-MCNC: 70 MG/DL
LYMPHOCYTES # BLD AUTO: 2.11 K/UL (ref 1–4.8)
LYMPHOCYTES NFR BLD: 36.7 % (ref 22–41)
MCH RBC QN AUTO: 31.7 PG (ref 27–33)
MCHC RBC AUTO-ENTMCNC: 33.6 G/DL (ref 32.3–36.5)
MCV RBC AUTO: 94.3 FL (ref 81.4–97.8)
MONOCYTES # BLD AUTO: 0.56 K/UL (ref 0–0.85)
MONOCYTES NFR BLD AUTO: 9.7 % (ref 0–13.4)
NEUTROPHILS # BLD AUTO: 2.91 K/UL (ref 1.82–7.42)
NEUTROPHILS NFR BLD: 50.6 % (ref 44–72)
NRBC # BLD AUTO: 0 K/UL
NRBC BLD-RTO: 0 /100 WBC (ref 0–0.2)
PLATELET # BLD AUTO: 163 K/UL (ref 164–446)
PMV BLD AUTO: 9.5 FL (ref 9–12.9)
POTASSIUM SERPL-SCNC: 4.1 MMOL/L (ref 3.6–5.5)
PROT SERPL-MCNC: 7.1 G/DL (ref 6–8.2)
RBC # BLD AUTO: 4.6 M/UL (ref 4.7–6.1)
SODIUM SERPL-SCNC: 140 MMOL/L (ref 135–145)
TRIGL SERPL-MCNC: 39 MG/DL (ref 0–149)
TSH SERPL DL<=0.005 MIU/L-ACNC: 2.33 UIU/ML (ref 0.38–5.33)
WBC # BLD AUTO: 5.8 K/UL (ref 4.8–10.8)

## 2024-02-27 PROCEDURE — 80053 COMPREHEN METABOLIC PANEL: CPT

## 2024-02-27 PROCEDURE — 80061 LIPID PANEL: CPT

## 2024-02-27 PROCEDURE — 83036 HEMOGLOBIN GLYCOSYLATED A1C: CPT

## 2024-02-27 PROCEDURE — 84443 ASSAY THYROID STIM HORMONE: CPT

## 2024-02-27 PROCEDURE — 82306 VITAMIN D 25 HYDROXY: CPT

## 2024-02-27 PROCEDURE — 85025 COMPLETE CBC W/AUTO DIFF WBC: CPT

## 2024-02-27 PROCEDURE — 36415 COLL VENOUS BLD VENIPUNCTURE: CPT

## 2024-03-22 DIAGNOSIS — E78.5 DYSLIPIDEMIA: ICD-10-CM

## 2024-03-22 NOTE — TELEPHONE ENCOUNTER
Received request via: Pharmacy    Was the patient seen in the last year in this department? Yes    Does the patient have an active prescription (recently filled or refills available) for medication(s) requested? No    [facility]    Does the patient have [facility] Plus and need 100 day supply (blood pressure, diabetes and cholesterol meds only)? Patient does not have SCP

## 2024-03-26 RX ORDER — ROSUVASTATIN CALCIUM 5 MG/1
5 TABLET, COATED ORAL EVERY EVENING
Qty: 90 TABLET | Refills: 3 | Status: SHIPPED | OUTPATIENT
Start: 2024-03-26

## 2024-08-07 DIAGNOSIS — I10 PRIMARY HYPERTENSION: ICD-10-CM

## 2024-08-07 RX ORDER — LOSARTAN POTASSIUM 50 MG/1
50 TABLET ORAL DAILY
Qty: 90 TABLET | Refills: 0 | Status: SHIPPED | OUTPATIENT
Start: 2024-08-07

## 2024-08-08 NOTE — TELEPHONE ENCOUNTER
Received request via: Pharmacy    Was the patient seen in the last year in this department? Yes    Does the patient have an active prescription (recently filled or refills available) for medication(s) requested? No    [facility]    Does the patient have MCFP Plus and need 100-day supply? (This applies to ALL medications) Patient does not have SCP

## 2024-09-10 ENCOUNTER — OFFICE VISIT (OUTPATIENT)
Dept: MEDICAL GROUP | Facility: LAB | Age: 64
End: 2024-09-10
Payer: COMMERCIAL

## 2024-09-10 VITALS
DIASTOLIC BLOOD PRESSURE: 78 MMHG | BODY MASS INDEX: 27.23 KG/M2 | HEIGHT: 74 IN | TEMPERATURE: 97.2 F | SYSTOLIC BLOOD PRESSURE: 124 MMHG | RESPIRATION RATE: 14 BRPM | OXYGEN SATURATION: 98 % | HEART RATE: 50 BPM | WEIGHT: 212.2 LBS

## 2024-09-10 DIAGNOSIS — M25.562 CHRONIC PAIN OF LEFT KNEE: ICD-10-CM

## 2024-09-10 DIAGNOSIS — Z00.00 ANNUAL PHYSICAL EXAM: ICD-10-CM

## 2024-09-10 DIAGNOSIS — Z78.9 ALCOHOL USE: ICD-10-CM

## 2024-09-10 DIAGNOSIS — G89.29 CHRONIC PAIN OF LEFT KNEE: ICD-10-CM

## 2024-09-10 PROCEDURE — 3078F DIAST BP <80 MM HG: CPT | Performed by: STUDENT IN AN ORGANIZED HEALTH CARE EDUCATION/TRAINING PROGRAM

## 2024-09-10 PROCEDURE — 99396 PREV VISIT EST AGE 40-64: CPT | Performed by: STUDENT IN AN ORGANIZED HEALTH CARE EDUCATION/TRAINING PROGRAM

## 2024-09-10 PROCEDURE — 3074F SYST BP LT 130 MM HG: CPT | Performed by: STUDENT IN AN ORGANIZED HEALTH CARE EDUCATION/TRAINING PROGRAM

## 2024-09-10 ASSESSMENT — PATIENT HEALTH QUESTIONNAIRE - PHQ9: CLINICAL INTERPRETATION OF PHQ2 SCORE: 0

## 2024-09-10 ASSESSMENT — ENCOUNTER SYMPTOMS
SHORTNESS OF BREATH: 0
CHILLS: 0
FEVER: 0

## 2024-09-10 ASSESSMENT — FIBROSIS 4 INDEX: FIB4 SCORE: 1.73

## 2024-09-10 NOTE — PROGRESS NOTES
Subjective:     Subjective:     CC:   Chief Complaint   Patient presents with    Annual Exam       HPI:   Edgar Jackson is a 64 y.o. male who presents for annual exam    Last Colorectal Cancer Screenin  Last Tdap: today  Received HPV series: Aged out  Hx STDs: No     Exercise: active daily, he is going to the gym 2-3x/week  Diet: eat mostly at home, eats vegetables fruits    He  has a past medical history of Arrhythmia (), Back pain, and Neck pain.  He  has a past surgical history that includes other (2018); cervical disk and fusion anterior (); other cardiac surgery (2018); cervical disk and fusion anterior (2018); hardware removal neuro (2018); other orthopedic surgery (Left, 2021); and pr laminotomy,lumbar disk,1 intrsp (N/A, 2021).    Family History   Problem Relation Age of Onset    Hypertension Father     Heart Disease Father      Social History     Tobacco Use    Smoking status: Never    Smokeless tobacco: Never   Vaping Use    Vaping status: Never Used   Substance Use Topics    Alcohol use: Yes     Comment: 4-5/day    Drug use: No     He  reports being sexually active and has had partner(s) who are female.    Patient Active Problem List    Diagnosis Date Noted    Chronic pain of left knee 09/10/2024    Nocturia associated with benign prostatic hyperplasia 2023    Degenerative lumbar spinal stenosis 2021    Spinal stenosis of lumbar region with neurogenic claudication s/p laminectomies c/b Aflutter 2021    Cervical stenosis of spinal canal 2018    Osteoarthritis of spine with radiculopathy, cervical region 2018    DDD (degenerative disc disease), lumbar 2018    Atrial flutter (HCC) 2018    Alcohol use 2018    Dyslipidemia 2010    HTN (hypertension) 2009     Current Outpatient Medications   Medication Sig Dispense Refill    losartan (COZAAR) 50 MG Tab TAKE 1 TABLET BY MOUTH EVERY DAY 90 Tablet 0     "rosuvastatin (CRESTOR) 5 MG Tab TAKE 1 TABLET BY MOUTH EVERY DAY IN THE EVENING 90 Tablet 3    tamsulosin (FLOMAX) 0.4 MG capsule TAKE 1 CAPSULE BY MOUTH EVERY DAY 90 Capsule 3     No current facility-administered medications for this visit.     Allergies   Allergen Reactions    Nkda [No Known Drug Allergy]        Review of Systems   Review of Systems   Constitutional:  Negative for chills and fever.   Respiratory:  Negative for shortness of breath.    Cardiovascular:  Negative for chest pain.        Objective:   /78 (BP Location: Right arm, Patient Position: Sitting, BP Cuff Size: Adult)   Pulse (!) 50   Temp 36.2 °C (97.2 °F) (Temporal)   Resp 14   Ht 1.88 m (6' 2\")   Wt 96.3 kg (212 lb 3.2 oz)   SpO2 98%   BMI 27.24 kg/m²      Wt Readings from Last 4 Encounters:   09/10/24 96.3 kg (212 lb 3.2 oz)   09/05/23 95.9 kg (211 lb 6.4 oz)   03/01/23 99.3 kg (219 lb)   02/16/23 97.5 kg (215 lb)           Physical Exam:  Physical Exam  Constitutional:       General: He is not in acute distress.     Appearance: He is not ill-appearing.   Pulmonary:      Effort: Pulmonary effort is normal.   Neurological:      Mental Status: He is alert.   Psychiatric:         Mood and Affect: Mood normal.         Behavior: Behavior normal.         Thought Content: Thought content normal.         Judgment: Judgment normal.             Assessment and Plan:     Problem List Items Addressed This Visit       Alcohol use     Chronic  -I discussed with patient benefits of abstaining from alcohol including benefits of alcohol          Chronic pain of left knee     Chronic  -recommended patient to discuss this further with KAREN          Relevant Orders    Referral to Orthopedics     Other Visit Diagnoses       Annual physical exam                 Health maintenance:    Labs per orders  Immunizations per orders  Age-appropriate guidance discussed including diet and exercise  Discussed diet and exercise     Follow-up: 1 year annual     "

## 2024-09-10 NOTE — ASSESSMENT & PLAN NOTE
Chronic  -I discussed with patient benefits of abstaining from alcohol including benefits of alcohol

## 2024-11-05 DIAGNOSIS — G89.18 PAIN, POSTOPERATIVE, ACUTE: ICD-10-CM

## 2024-11-05 DIAGNOSIS — R35.1 NOCTURIA ASSOCIATED WITH BENIGN PROSTATIC HYPERPLASIA: ICD-10-CM

## 2024-11-05 DIAGNOSIS — N40.1 NOCTURIA ASSOCIATED WITH BENIGN PROSTATIC HYPERPLASIA: ICD-10-CM

## 2024-11-05 RX ORDER — TAMSULOSIN HYDROCHLORIDE 0.4 MG/1
0.4 CAPSULE ORAL DAILY
Qty: 90 CAPSULE | Refills: 0 | Status: SHIPPED | OUTPATIENT
Start: 2024-11-05

## 2024-11-06 DIAGNOSIS — I10 PRIMARY HYPERTENSION: ICD-10-CM

## 2024-11-06 RX ORDER — LOSARTAN POTASSIUM 50 MG/1
50 TABLET ORAL DAILY
Qty: 90 TABLET | Refills: 0 | Status: SHIPPED | OUTPATIENT
Start: 2024-11-06

## 2024-11-06 NOTE — TELEPHONE ENCOUNTER
Received request via: Pharmacy    Was the patient seen in the last year in this department? Yes    Does the patient have an active prescription (recently filled or refills available) for medication(s) requested? No    [facility]    Does the patient have [facility] Plus and need 100-day supply? (This applies to ALL medications) Patient does not have SCP

## 2025-01-14 PROBLEM — M17.9 OSTEOARTHRITIS, KNEE: Status: ACTIVE | Noted: 2025-01-14

## 2025-01-28 ENCOUNTER — HOSPITAL ENCOUNTER (OUTPATIENT)
Dept: RADIOLOGY | Facility: MEDICAL CENTER | Age: 65
End: 2025-01-28
Attending: STUDENT IN AN ORGANIZED HEALTH CARE EDUCATION/TRAINING PROGRAM
Payer: COMMERCIAL

## 2025-01-28 DIAGNOSIS — M17.12 PRIMARY OSTEOARTHRITIS OF LEFT KNEE: ICD-10-CM

## 2025-01-28 PROCEDURE — 73700 CT LOWER EXTREMITY W/O DYE: CPT | Mod: LT

## 2025-02-01 DIAGNOSIS — I10 PRIMARY HYPERTENSION: ICD-10-CM

## 2025-02-01 DIAGNOSIS — N40.1 NOCTURIA ASSOCIATED WITH BENIGN PROSTATIC HYPERPLASIA: ICD-10-CM

## 2025-02-01 DIAGNOSIS — R35.1 NOCTURIA ASSOCIATED WITH BENIGN PROSTATIC HYPERPLASIA: ICD-10-CM

## 2025-02-03 RX ORDER — TAMSULOSIN HYDROCHLORIDE 0.4 MG/1
0.4 CAPSULE ORAL DAILY
Qty: 90 CAPSULE | Refills: 2 | Status: SHIPPED | OUTPATIENT
Start: 2025-02-03

## 2025-02-03 RX ORDER — LOSARTAN POTASSIUM 50 MG/1
50 TABLET ORAL DAILY
Qty: 90 TABLET | Refills: 2 | Status: SHIPPED | OUTPATIENT
Start: 2025-02-03

## 2025-02-03 NOTE — PROGRESS NOTES
"Subjective:     CC: lab results     HPI:   Edgar presents today for the following;    Problem   Dyslipidemia      Lab Results   Component Value Date/Time    CHOLSTRLTOT 216 (H) 01/31/2023 11:57 AM     (H) 01/31/2023 11:57 AM    HDL 74 01/31/2023 11:57 AM    TRIGLYCERIDE 69 01/31/2023 11:57 AM           Htn (Hypertension)    Patient reports his BP his consisently in the high 130's/91. Patient reports his still elevated blood pressure despite the 25mg of losartan         Current Outpatient Medications Ordered in Epic   Medication Sig Dispense Refill    tamsulosin (FLOMAX) 0.4 MG capsule TAKE 1 CAPSULE BY MOUTH EVERY DAY 90 Capsule 3    losartan (COZAAR) 50 MG Tab Take 1 Tablet by mouth every day. 90 Tablet 1     No current Epic-ordered facility-administered medications on file.           ROS:  Review of Systems   Constitutional:  Negative for chills and fever.   Respiratory:  Negative for shortness of breath.    Cardiovascular:  Negative for chest pain.     Objective:     Exam:  /82 (BP Location: Left arm, Patient Position: Sitting, BP Cuff Size: Adult)   Pulse (!) 50   Temp 36.1 °C (97 °F) (Temporal)   Ht 1.88 m (6' 2\")   Wt 97.5 kg (215 lb)   SpO2 95%   BMI 27.60 kg/m²  Body mass index is 27.6 kg/m².    Physical Exam  Constitutional:       General: He is not in acute distress.     Appearance: He is not ill-appearing.   Pulmonary:      Effort: Pulmonary effort is normal.   Neurological:      Mental Status: He is alert.   Psychiatric:         Mood and Affect: Mood normal.         Behavior: Behavior normal.         Thought Content: Thought content normal.         Judgment: Judgment normal.             Assessment & Plan:     Problem List Items Addressed This Visit       Dyslipidemia     Chronic  -will check CT cardiac score  -recheck lipid panel in the future          HTN (hypertension)     Chronic-not completely controlled  -increase losartan to 50mg         Relevant Medications    losartan " (COZAAR) 50 MG Tab     Other Visit Diagnoses       Other specified personal risk factors, not elsewhere classified        Relevant Orders    CT-CARDIAC SCORING                  Return in about 6 months (around 8/16/2023) for Annual physical .    Please note that this dictation was created using voice recognition software. I have made every reasonable attempt to correct obvious errors, but I expect that there are errors of grammar and possibly content that I did not discover before finalizing the note.         see md note

## 2025-03-07 ENCOUNTER — RESULTS FOLLOW-UP (OUTPATIENT)
Dept: MEDICAL GROUP | Facility: LAB | Age: 65
End: 2025-03-07
Payer: COMMERCIAL

## 2025-03-07 ENCOUNTER — HOSPITAL ENCOUNTER (OUTPATIENT)
Facility: MEDICAL CENTER | Age: 65
End: 2025-03-07
Attending: STUDENT IN AN ORGANIZED HEALTH CARE EDUCATION/TRAINING PROGRAM
Payer: COMMERCIAL

## 2025-03-07 DIAGNOSIS — Z00.00 ANNUAL PHYSICAL EXAM: ICD-10-CM

## 2025-03-07 LAB
25(OH)D3 SERPL-MCNC: 28 NG/ML (ref 30–100)
ALBUMIN SERPL BCP-MCNC: 3.8 G/DL (ref 3.2–4.9)
ALBUMIN/GLOB SERPL: 1.5 G/DL
ALP SERPL-CCNC: 75 U/L (ref 30–99)
ALT SERPL-CCNC: 20 U/L (ref 2–50)
ANION GAP SERPL CALC-SCNC: 7 MMOL/L (ref 7–16)
AST SERPL-CCNC: 19 U/L (ref 12–45)
BASOPHILS # BLD AUTO: 0.6 % (ref 0–1.8)
BASOPHILS # BLD: 0.03 K/UL (ref 0–0.12)
BILIRUB SERPL-MCNC: 0.2 MG/DL (ref 0.1–1.5)
BUN SERPL-MCNC: 13 MG/DL (ref 8–22)
CALCIUM ALBUM COR SERPL-MCNC: 9.5 MG/DL (ref 8.5–10.5)
CALCIUM SERPL-MCNC: 9.3 MG/DL (ref 8.4–10.2)
CHLORIDE SERPL-SCNC: 105 MMOL/L (ref 96–112)
CHOLEST SERPL-MCNC: 165 MG/DL (ref 100–199)
CO2 SERPL-SCNC: 27 MMOL/L (ref 20–33)
CREAT SERPL-MCNC: 0.92 MG/DL (ref 0.5–1.4)
EOSINOPHIL # BLD AUTO: 0.21 K/UL (ref 0–0.51)
EOSINOPHIL NFR BLD: 3.9 % (ref 0–6.9)
ERYTHROCYTE [DISTWIDTH] IN BLOOD BY AUTOMATED COUNT: 46.2 FL (ref 35.9–50)
EST. AVERAGE GLUCOSE BLD GHB EST-MCNC: 111 MG/DL
FASTING STATUS PATIENT QL REPORTED: NORMAL
GFR SERPLBLD CREATININE-BSD FMLA CKD-EPI: 93 ML/MIN/1.73 M 2
GLOBULIN SER CALC-MCNC: 2.5 G/DL (ref 1.9–3.5)
GLUCOSE SERPL-MCNC: 93 MG/DL (ref 65–99)
HBA1C MFR BLD: 5.5 % (ref 4–5.6)
HCT VFR BLD AUTO: 34.1 % (ref 42–52)
HDLC SERPL-MCNC: 43 MG/DL
HGB BLD-MCNC: 11 G/DL (ref 14–18)
IMM GRANULOCYTES # BLD AUTO: 0.01 K/UL (ref 0–0.11)
IMM GRANULOCYTES NFR BLD AUTO: 0.2 % (ref 0–0.9)
LDLC SERPL CALC-MCNC: 111 MG/DL
LYMPHOCYTES # BLD AUTO: 1.2 K/UL (ref 1–4.8)
LYMPHOCYTES NFR BLD: 22.1 % (ref 22–41)
MCH RBC QN AUTO: 31.6 PG (ref 27–33)
MCHC RBC AUTO-ENTMCNC: 32.3 G/DL (ref 32.3–36.5)
MCV RBC AUTO: 98 FL (ref 81.4–97.8)
MONOCYTES # BLD AUTO: 0.46 K/UL (ref 0–0.85)
MONOCYTES NFR BLD AUTO: 8.5 % (ref 0–13.4)
NEUTROPHILS # BLD AUTO: 3.52 K/UL (ref 1.82–7.42)
NEUTROPHILS NFR BLD: 64.7 % (ref 44–72)
NRBC # BLD AUTO: 0 K/UL
NRBC BLD-RTO: 0 /100 WBC (ref 0–0.2)
PLATELET # BLD AUTO: 387 K/UL (ref 164–446)
PMV BLD AUTO: 8.5 FL (ref 9–12.9)
POTASSIUM SERPL-SCNC: 4.6 MMOL/L (ref 3.6–5.5)
PROT SERPL-MCNC: 6.3 G/DL (ref 6–8.2)
RBC # BLD AUTO: 3.48 M/UL (ref 4.7–6.1)
SODIUM SERPL-SCNC: 139 MMOL/L (ref 135–145)
TRIGL SERPL-MCNC: 56 MG/DL (ref 0–149)
TSH SERPL DL<=0.005 MIU/L-ACNC: 2.23 UIU/ML (ref 0.38–5.33)
WBC # BLD AUTO: 5.4 K/UL (ref 4.8–10.8)

## 2025-03-07 PROCEDURE — 84443 ASSAY THYROID STIM HORMONE: CPT

## 2025-03-07 PROCEDURE — 82306 VITAMIN D 25 HYDROXY: CPT

## 2025-03-07 PROCEDURE — 80061 LIPID PANEL: CPT

## 2025-03-07 PROCEDURE — 80053 COMPREHEN METABOLIC PANEL: CPT

## 2025-03-07 PROCEDURE — 85025 COMPLETE CBC W/AUTO DIFF WBC: CPT

## 2025-03-07 PROCEDURE — 83036 HEMOGLOBIN GLYCOSYLATED A1C: CPT

## 2025-03-07 PROCEDURE — 36415 COLL VENOUS BLD VENIPUNCTURE: CPT

## 2025-04-04 ENCOUNTER — OFFICE VISIT (OUTPATIENT)
Dept: MEDICAL GROUP | Facility: LAB | Age: 65
End: 2025-04-04
Payer: COMMERCIAL

## 2025-04-04 VITALS
OXYGEN SATURATION: 96 % | RESPIRATION RATE: 14 BRPM | BODY MASS INDEX: 27.49 KG/M2 | SYSTOLIC BLOOD PRESSURE: 128 MMHG | WEIGHT: 214.2 LBS | DIASTOLIC BLOOD PRESSURE: 70 MMHG | HEIGHT: 74 IN | HEART RATE: 68 BPM | TEMPERATURE: 97 F

## 2025-04-04 DIAGNOSIS — Z12.5 PROSTATE CANCER SCREENING: ICD-10-CM

## 2025-04-04 DIAGNOSIS — F10.90 ALCOHOL USE: ICD-10-CM

## 2025-04-04 DIAGNOSIS — E78.5 DYSLIPIDEMIA: ICD-10-CM

## 2025-04-04 DIAGNOSIS — D64.9 ANEMIA, UNSPECIFIED TYPE: ICD-10-CM

## 2025-04-04 DIAGNOSIS — Z12.11 COLON CANCER SCREENING: ICD-10-CM

## 2025-04-04 PROBLEM — Z96.652 HISTORY OF LEFT KNEE REPLACEMENT: Status: ACTIVE | Noted: 2024-09-10

## 2025-04-04 PROCEDURE — 3074F SYST BP LT 130 MM HG: CPT | Performed by: STUDENT IN AN ORGANIZED HEALTH CARE EDUCATION/TRAINING PROGRAM

## 2025-04-04 PROCEDURE — 3078F DIAST BP <80 MM HG: CPT | Performed by: STUDENT IN AN ORGANIZED HEALTH CARE EDUCATION/TRAINING PROGRAM

## 2025-04-04 PROCEDURE — 99214 OFFICE O/P EST MOD 30 MIN: CPT | Performed by: STUDENT IN AN ORGANIZED HEALTH CARE EDUCATION/TRAINING PROGRAM

## 2025-04-04 RX ORDER — ROSUVASTATIN CALCIUM 5 MG/1
5 TABLET, COATED ORAL EVERY EVENING
Qty: 90 TABLET | Refills: 3 | Status: SHIPPED | OUTPATIENT
Start: 2025-04-04

## 2025-04-04 ASSESSMENT — PATIENT HEALTH QUESTIONNAIRE - PHQ9: CLINICAL INTERPRETATION OF PHQ2 SCORE: 0

## 2025-04-04 ASSESSMENT — ENCOUNTER SYMPTOMS
SHORTNESS OF BREATH: 0
FEVER: 0
CHILLS: 0

## 2025-04-04 ASSESSMENT — FIBROSIS 4 INDEX: FIB4 SCORE: 0.69

## 2025-04-04 NOTE — PROGRESS NOTES
Subjective:     CC:   Chief Complaint   Patient presents with    Results        HPI:     Problem   History of Left Knee Replacement    -patient reports having menisectomy 3.5 years ago at [facility] but continues to have pain and not able to run   -he has done PT in the past      Alcohol Use    Patient is still drinking 4-5 drinks/day    4/4/25  -patient has cut down to 1-2 drinks/day     Dyslipidemia      Lab Results   Component Value Date/Time    CHOLSTRLTOT 216 (H) 01/31/2023 11:57 AM     (H) 01/31/2023 11:57 AM    HDL 74 01/31/2023 11:57 AM    TRIGLYCERIDE 69 01/31/2023 11:57 AM    -high CT cardiac score reported     9/5/23  -patient was started on rosuvastatin 5mg prevoiusly, he has not completed his new blood work to recheck his cholesterol and liver function     4/4/25  -patient has been off of rosuvastatin 5mg          Current Outpatient Medications Ordered in Epic   Medication Sig Dispense Refill    rosuvastatin (CRESTOR) 5 MG Tab Take 1 Tablet by mouth every evening. 90 Tablet 3    acetaminophen (TYLENOL) 500 MG Tab Take 2 Tablets by mouth every 8 hours. 90 Tablet 1    aspirin 81 MG EC tablet Take 1 Tablet by mouth 2 (two) times a day. 60 Tablet 0    tamsulosin (FLOMAX) 0.4 MG capsule TAKE 1 CAPSULE BY MOUTH EVERY DAY 90 Capsule 2    losartan (COZAAR) 50 MG Tab TAKE 1 TABLET BY MOUTH EVERY DAY 90 Tablet 2    meloxicam (MOBIC) 15 MG tablet Take 1 Tablet by mouth every day. (Patient not taking: Reported on 4/4/2025) 30 Tablet 1    docusate sodium (COLACE) 100 MG Cap Take 1 Capsule by mouth 2 times a day. (Patient not taking: Reported on 4/4/2025) 60 Capsule 0    methocarbamol (ROBAXIN) 500 MG Tab Take 2 Tablets by mouth 3 times a day. (Patient not taking: Reported on 4/4/2025) 45 Tablet 0     No current Epic-ordered facility-administered medications on file.           ROS:  Review of Systems   Constitutional:  Negative for chills and fever.   Respiratory:  Negative for shortness of breath.    Cardiovascular:  " Negative for chest pain.       Objective:     Exam:  /70 (BP Location: Right arm, Patient Position: Sitting, BP Cuff Size: Adult)   Pulse 68   Temp 36.1 °C (97 °F) (Temporal)   Resp 14   Ht 1.88 m (6' 2\")   Wt 97.2 kg (214 lb 3.2 oz)   SpO2 96%   BMI 27.50 kg/m²  Body mass index is 27.5 kg/m².    Physical Exam  Constitutional:       General: He is not in acute distress.     Appearance: He is not ill-appearing.   Pulmonary:      Effort: Pulmonary effort is normal.   Neurological:      Mental Status: He is alert.   Psychiatric:         Mood and Affect: Mood normal.         Behavior: Behavior normal.         Thought Content: Thought content normal.         Judgment: Judgment normal.                   Assessment & Plan:     Problem List Items Addressed This Visit       Alcohol use    Chronic-improved  -continue decrease in alcohol          Dyslipidemia    Chronic  -pt advised to restart rosuvastatin 5mg          Relevant Medications    rosuvastatin (CRESTOR) 5 MG Tab     Other Visit Diagnoses         Colon cancer screening        Relevant Orders    Cologuard® colon cancer screening      Prostate cancer screening        Relevant Orders    PROSTATE SPECIFIC AG SCREENING      Anemia, unspecified type        Relevant Orders    CBC WITH DIFFERENTIAL          5 month annual     3+ labs reviewed     Please note that this dictation was created using voice recognition software. I have made every reasonable attempt to correct obvious errors, but I expect that there are errors of grammar and possibly content that I did not discover before finalizing the note.   "

## 2025-04-23 LAB — NONINV COLON CA DNA+OCC BLD SCRN STL QL: NORMAL

## 2025-05-13 LAB — NONINV COLON CA DNA+OCC BLD SCRN STL QL: NEGATIVE

## 2025-05-15 ENCOUNTER — RESULTS FOLLOW-UP (OUTPATIENT)
Dept: MEDICAL GROUP | Facility: LAB | Age: 65
End: 2025-05-15

## (undated) DEVICE — SUTURE 1 VICRYL PLUS CT-1 - 18 INCH (12/BX)

## (undated) DEVICE — GLOVE PROTEXIS W/NEU-THERA SZ 8.5 (50PR/BX)

## (undated) DEVICE — SCREW DISTRACTION 14MM YELLOW - STERILE (10EA/BX) (5TX4=20)

## (undated) DEVICE — DRILL BIT 12MM

## (undated) DEVICE — TRAY SKIN SCRUB PVP WET (20EA/CA) PART #DYND70356 DISCONTINUED

## (undated) DEVICE — TOOL DISSECT MATCH HEAD

## (undated) DEVICE — KIT SURGIFLO W/OUT THROMBIN - (6EA/CA)

## (undated) DEVICE — MASK ANESTHESIA ADULT  - (100/CA)

## (undated) DEVICE — PACK NEURO - (2EA/CA)

## (undated) DEVICE — PROTECTOR ULNA NERVE - (36PR/CA)

## (undated) DEVICE — NEPTUNE 4 PORT MANIFOLD - (20/PK)

## (undated) DEVICE — KIT ROOM DECONTAMINATION

## (undated) DEVICE — TOWEL STOP TIMEOUT SAFETY FLAG (40EA/CA)

## (undated) DEVICE — BONE WAX (12PK/BX)

## (undated) DEVICE — DRAPE LARGE 3 QUARTER - (20/CA)

## (undated) DEVICE — SYRINGE NON SAFETY 10 CC 20 GA X 1-1/2 IN (100/BX 4BX/CA)

## (undated) DEVICE — SODIUM CHL IRRIGATION 0.9% 1000ML (12EA/CA)

## (undated) DEVICE — DRAPE STRLE REG TOWEL 18X24 - (10/BX 4BX/CA)"

## (undated) DEVICE — TUBING C&T SET FLYING LEADS DRAIN TUBING (10EA/BX)

## (undated) DEVICE — BONE PRESS SPINAL EDITION HENSLER (10EA/CA)

## (undated) DEVICE — LIGHT SOURCE MIS 12FT

## (undated) DEVICE — DRESSING TRANSPARENT FILM TEGADERM 4 X 4.75" (50EA/BX)"

## (undated) DEVICE — GOWN WARMING STANDARD FLEX - (30/CA)

## (undated) DEVICE — GLOVE BIOGEL ECLIPSE PF LATEX SIZE 8.0  (50PR/BX)

## (undated) DEVICE — GLOVE BIOGEL PI INDICATOR SZ 8.0 SURGICAL PF LF -(50/BX 4BX/CA)

## (undated) DEVICE — MIDAS LUBRICATOR DIFFUSER PACK (4EA/CA)

## (undated) DEVICE — ARMREST CRADLE FOAM - (2PR/PK 12PR/CA)

## (undated) DEVICE — SUCTION INSTRUMENT YANKAUER BULBOUS TIP W/O VENT (50EA/CA)

## (undated) DEVICE — PATTIES SURG NEURO X-RAY 1X1 (10EA/PK 20PK/CA)

## (undated) DEVICE — SUTURE 2-0 VICRYL PLUS CT-1 - 8 X 18 INCH(12/BX)

## (undated) DEVICE — COVER MAYO STAND X-LG - (22EA/CA)

## (undated) DEVICE — TOWELS CLOTH SURGICAL - (4/PK 20PK/CA)

## (undated) DEVICE — BLADE SURGICAL #11 - (50/BX)

## (undated) DEVICE — CANISTER SUCTION 3000ML MECHANICAL FILTER AUTO SHUTOFF MEDI-VAC NONSTERILE LF DISP  (40EA/CA)

## (undated) DEVICE — LACTATED RINGERS INJ. 500 ML - (24EA/CA)

## (undated) DEVICE — DRAPE LAPAROTOMY T SHEET - (12EA/CA)

## (undated) DEVICE — BLADE SURGICAL CLIPPER - (50EA/CA)

## (undated) DEVICE — GLOVE BIOGEL PI ORTHO SZ 7.5 PF LF (40PR/BX)

## (undated) DEVICE — INTRAOP NEURO IN OR 1:1 PER 15 MIN

## (undated) DEVICE — SUTURE 3-0 VICRYL PLUS RB-1 - 8 X 18 INCH (12/BX)

## (undated) DEVICE — KIT EVACUATER 3 SPRING PVC LF 1/8 DRAIN SIZE (10EA/CA)"

## (undated) DEVICE — NEEDLE NON SAFETY HYPO 22 GA X 1 1/2 IN (100/BX)

## (undated) DEVICE — SYRINGE 30 ML LL (56/BX)

## (undated) DEVICE — PACK JACKSON TABLE KIT W/OUT - HR (6EA/CA)

## (undated) DEVICE — PATTIES SURG NEURO X-RAY 1/2X1/2 (10EA/PK 20PK/CS)

## (undated) DEVICE — KIT ANESTHESIA W/CIRCUIT & 3/LT BAG W/FILTER (20EA/CA)

## (undated) DEVICE — DISSECT TOOL MIDAS

## (undated) DEVICE — DRAPE IOBAN II 23 IN X 33 IN - (10/BX)

## (undated) DEVICE — STERI STRIP COMPOUND BENZOIN - TINCTURE 0.6ML WITH APPLICATOR (40EA/BX)

## (undated) DEVICE — SYRINGE SAFETY 10 ML 18 GA X 1 1/2 BLUNT LL (100/BX 4BX/CA)

## (undated) DEVICE — SHEET PEDIATRIC LAPAROTOMY - (10/CA)

## (undated) DEVICE — LACTATED RINGERS INJ 1000 ML - (14EA/CA 60CA/PF)

## (undated) DEVICE — PACK MINOR BASIN - (2EA/CA)

## (undated) DEVICE — SET EPIDURAL BURRON NON-SAFETY (12EA/CA)

## (undated) DEVICE — NEEDLE SPINAL NON-SAFETY 18 GA X 3 IN (25EA/BX)

## (undated) DEVICE — SLEEVE, VASO, THIGH, MED

## (undated) DEVICE — ELECTRODE DUAL RETURN W/ CORD - (50/PK)

## (undated) DEVICE — CHLORAPREP 26 ML APPLICATOR - ORANGE TINT(25/CA)

## (undated) DEVICE — SPONGE GAUZESTER 4 X 4 4PLY - (128PK/CA)

## (undated) DEVICE — SOD. CHL. INJ. 0.9% 1000 ML - (14EA/CA 60CA/PF)

## (undated) DEVICE — SET EXTENSION WITH 2 PORTS (48EA/CA) ***PART #2C8610 IS A SUBSTITUTE*****

## (undated) DEVICE — ELECTRODE 850 FOAM ADHESIVE - HYDROGEL RADIOTRNSPRNT (50/PK)

## (undated) DEVICE — DRAPE MAYO STAND - (30/CA)

## (undated) DEVICE — CONTAINER SPECIMEN BAG OR - STERILE 4 OZ W/LID (100EA/CA)

## (undated) DEVICE — DRAPE 36X28IN RAD CARM BND BG - (25/CA) O

## (undated) DEVICE — SENSOR SPO2 NEO LNCS ADHESIVE (20/BX) SEE USER NOTES

## (undated) DEVICE — GLOVE BIOGEL SZ 7 SURGICAL PF LTX - (50PR/BX 4BX/CA)

## (undated) DEVICE — SLEEVE VASO CALF MED - (10PR/CA)

## (undated) DEVICE — TUBING CLEARLINK DUO-VENT - C-FLO (48EA/CA)

## (undated) DEVICE — CLOSURE SKIN STRIP 1/2 X 4 IN - (STERI STRIP) (50/BX 4BX/CA)

## (undated) DEVICE — DRAIN JACKSON PRATT 10FR - (10/CS)

## (undated) DEVICE — SYRINGE DISP. 60 CC LL - (30/BX, 12BX/CA)**WHEN THESE ARE GONE ORDER #500206**

## (undated) DEVICE — NEEDLE 20 GA X 1 INCH - NON SAFTEY (100/BX)

## (undated) DEVICE — HEADREST PRONEVIEW LARGE - (10/CA)

## (undated) DEVICE — CATHETER EPIDURAL PORTEX (10/BX) ***DISCONTINUED LOOKING INTO SUB***

## (undated) DEVICE — RESERVOIR SUCTION 100 CC - SILICONE (20EA/CA)

## (undated) DEVICE — GLOVE BIOGEL PI INDICATOR SZ 7.0 SURGICAL PF LF - (50/BX 4BX/CA)

## (undated) DEVICE — TUBE E-T HI-LO CUFF 8.0MM (10EA/PK)

## (undated) DEVICE — HEAD HOLDER JUNIOR/ADULT

## (undated) DEVICE — RESTRAINTS LIMB DISP. - (12/BX 4BX/CA)

## (undated) DEVICE — GOWN SURGEONS X-LARGE - DISP. (30/CA)

## (undated) DEVICE — NEEDLE NON-SAFETY HYPO 21 GA X 1 1/2 IN HYPO (100/BX)

## (undated) DEVICE — DRAPE MICROSCOPE X-LONG (10EA/CA)

## (undated) DEVICE — BOVIE  BLADE 6 EXTENDED - (50/PK)

## (undated) DEVICE — SUTURE GENERAL

## (undated) DEVICE — DISSECT TOOL MIDAS REX

## (undated) DEVICE — BOVIE BLADE COATED &INSULATED - 25/PK

## (undated) DEVICE — SYRINGE 12 CC LUER TIP - (80/BX) OBSOLETE ITEM

## (undated) DEVICE — SET LEADWIRE 5 LEAD BEDSIDE DISPOSABLE ECG (1SET OF 5/EA)

## (undated) DEVICE — FIX PIN

## (undated) DEVICE — CATHETER IV 14 GA X 2 ---SURG.& SDS ONLY---(200EA/CA)

## (undated) DEVICE — DRAPE SURG STERI-DRAPE 7X11OD - (40EA/CA)

## (undated) DEVICE — GOWN SURGEONS LARGE - (32/CA)

## (undated) DEVICE — GLOVE BIOGEL ECLIPSE PF LATEX SIZE 8.5 (50PR/BX)